# Patient Record
Sex: FEMALE | Race: WHITE | Employment: OTHER | URBAN - METROPOLITAN AREA
[De-identification: names, ages, dates, MRNs, and addresses within clinical notes are randomized per-mention and may not be internally consistent; named-entity substitution may affect disease eponyms.]

---

## 2018-06-15 ENCOUNTER — TELEPHONE (OUTPATIENT)
Dept: PAIN MANAGEMENT | Age: 78
End: 2018-06-15

## 2018-06-15 NOTE — LETTER
06/15/18    [facility]   Dr. Janice Guerrero, [address] [facility]   P: [phone]  F: [phone]    Re: Andriy Briseno 1940    To Whom it May Concern: Thank you for your referral to [facility] ([facility] Pain Management. Regrettably, we did not receive all the necessary information to determine if this patient can be accepted into our office. The diagnosis attached to the referral only states \"generalized osteoarthritis\" and this, unfortunately, does not alert us to what is causing her pain or the area the pain is in and There is no site specific imaging available for the reported body part causing pain. I am sending this message to advise your office of what is needed for her to be considered as a new patient with us. Please know that the information requested is reviewed by our providers and must be accepted by one of them prior to scheduling any appointments. It is necessary to determine the need for pain management/narcotics due to stricter laws and regulations set in place by the state of [place], and all patients are required to provide this information without exception. For physician review and follow up, please include ALL the following:     · Referral that includes diagnosis codes showing what is causing her pain  · Demographics sheet and Insurance card   · Last 2 completed office notes (1 is fine if she has only been seen once)  · Site specific imaging (cannot be more than 11years old & must match referral diagnosis. Fibro/Neuropathy will require EMG)  · Most recent Urine Drug Screen results - not required if none available  · Pre-Approved C9 for Pain Mgmt Consult/Letter from [facility] authorizing Pain Mgmt & a list of allowed conditions -  for [facility] patients only    The requested information should be faxed to [phone] or you may call us at [phone] to let us know that new information is available.  Please be aware that imaging is required for every referral. If none is

## 2018-06-15 NOTE — TELEPHONE ENCOUNTER
We received a referral for this patient, but information was missing. We need OV notes, a diagnosis code that alerts us to what is causing the pain or the area the pain is in, imaging, Insurance information, [facility] info and UDS to complete her referral. A letter was faxed to the referring provider to request additional information. If nothing is received within 30 days from todays date, the referral will be canceled.

## 2022-05-16 ENCOUNTER — APPOINTMENT (OUTPATIENT)
Dept: GENERAL RADIOLOGY | Age: 82
DRG: 065 | End: 2022-05-16
Payer: MEDICARE

## 2022-05-16 ENCOUNTER — APPOINTMENT (OUTPATIENT)
Dept: MRI IMAGING | Age: 82
DRG: 065 | End: 2022-05-16
Payer: MEDICARE

## 2022-05-16 ENCOUNTER — HOSPITAL ENCOUNTER (INPATIENT)
Age: 82
LOS: 2 days | Discharge: NURSING, REHAB OR LONG-TERM CARE | DRG: 065 | End: 2022-05-18
Attending: EMERGENCY MEDICINE | Admitting: INTERNAL MEDICINE
Payer: MEDICARE

## 2022-05-16 ENCOUNTER — APPOINTMENT (OUTPATIENT)
Dept: CT IMAGING | Age: 82
DRG: 065 | End: 2022-05-16
Payer: MEDICARE

## 2022-05-16 DIAGNOSIS — I63.9 CEREBROVASCULAR ACCIDENT (CVA), UNSPECIFIED MECHANISM (HCC): Primary | ICD-10-CM

## 2022-05-16 LAB
A/G RATIO: 1.6 (ref 1.1–2.2)
ALBUMIN SERPL-MCNC: 3.8 G/DL (ref 3.4–5)
ALP BLD-CCNC: 74 U/L (ref 40–129)
ALT SERPL-CCNC: 11 U/L (ref 10–40)
ANION GAP SERPL CALCULATED.3IONS-SCNC: 10 MMOL/L (ref 3–16)
AST SERPL-CCNC: 15 U/L (ref 15–37)
BASOPHILS ABSOLUTE: 0.2 K/UL (ref 0–0.2)
BASOPHILS RELATIVE PERCENT: 1.4 %
BILIRUB SERPL-MCNC: 0.4 MG/DL (ref 0–1)
BUN BLDV-MCNC: 8 MG/DL (ref 7–20)
CALCIUM SERPL-MCNC: 8.9 MG/DL (ref 8.3–10.6)
CHLORIDE BLD-SCNC: 105 MMOL/L (ref 99–110)
CO2: 25 MMOL/L (ref 21–32)
CREAT SERPL-MCNC: 0.7 MG/DL (ref 0.6–1.2)
EKG ATRIAL RATE: 48 BPM
EKG DIAGNOSIS: NORMAL
EKG P AXIS: 48 DEGREES
EKG P-R INTERVAL: 152 MS
EKG Q-T INTERVAL: 442 MS
EKG QRS DURATION: 90 MS
EKG QTC CALCULATION (BAZETT): 394 MS
EKG R AXIS: -21 DEGREES
EKG T AXIS: 29 DEGREES
EKG VENTRICULAR RATE: 48 BPM
EOSINOPHILS ABSOLUTE: 0.1 K/UL (ref 0–0.6)
EOSINOPHILS RELATIVE PERCENT: 1.1 %
GFR AFRICAN AMERICAN: >60
GFR NON-AFRICAN AMERICAN: >60
GLUCOSE BLD-MCNC: 107 MG/DL (ref 70–99)
GLUCOSE BLD-MCNC: 116 MG/DL (ref 70–99)
HCT VFR BLD CALC: 33.5 % (ref 36–48)
HEMOGLOBIN: 11.2 G/DL (ref 12–16)
INR BLD: 0.98 (ref 0.88–1.12)
LYMPHOCYTES ABSOLUTE: 1.9 K/UL (ref 1–5.1)
LYMPHOCYTES RELATIVE PERCENT: 15.3 %
MCH RBC QN AUTO: 33.9 PG (ref 26–34)
MCHC RBC AUTO-ENTMCNC: 33.5 G/DL (ref 31–36)
MCV RBC AUTO: 101.3 FL (ref 80–100)
MONOCYTES ABSOLUTE: 0.6 K/UL (ref 0–1.3)
MONOCYTES RELATIVE PERCENT: 4.5 %
NEUTROPHILS ABSOLUTE: 9.6 K/UL (ref 1.7–7.7)
NEUTROPHILS RELATIVE PERCENT: 77.7 %
PDW BLD-RTO: 13.3 % (ref 12.4–15.4)
PERFORMED ON: ABNORMAL
PLATELET # BLD: 258 K/UL (ref 135–450)
PMV BLD AUTO: 9 FL (ref 5–10.5)
POTASSIUM REFLEX MAGNESIUM: 4 MMOL/L (ref 3.5–5.1)
PROTHROMBIN TIME: 11.1 SEC (ref 9.9–12.7)
RBC # BLD: 3.31 M/UL (ref 4–5.2)
SODIUM BLD-SCNC: 140 MMOL/L (ref 136–145)
TOTAL PROTEIN: 6.2 G/DL (ref 6.4–8.2)
TROPONIN: <0.01 NG/ML
WBC # BLD: 12.4 K/UL (ref 4–11)

## 2022-05-16 PROCEDURE — 6360000004 HC RX CONTRAST MEDICATION: Performed by: STUDENT IN AN ORGANIZED HEALTH CARE EDUCATION/TRAINING PROGRAM

## 2022-05-16 PROCEDURE — 93005 ELECTROCARDIOGRAM TRACING: CPT | Performed by: STUDENT IN AN ORGANIZED HEALTH CARE EDUCATION/TRAINING PROGRAM

## 2022-05-16 PROCEDURE — 2580000003 HC RX 258: Performed by: INTERNAL MEDICINE

## 2022-05-16 PROCEDURE — 80061 LIPID PANEL: CPT

## 2022-05-16 PROCEDURE — 2060000000 HC ICU INTERMEDIATE R&B

## 2022-05-16 PROCEDURE — 71045 X-RAY EXAM CHEST 1 VIEW: CPT

## 2022-05-16 PROCEDURE — 85025 COMPLETE CBC W/AUTO DIFF WBC: CPT

## 2022-05-16 PROCEDURE — 80053 COMPREHEN METABOLIC PANEL: CPT

## 2022-05-16 PROCEDURE — 83036 HEMOGLOBIN GLYCOSYLATED A1C: CPT

## 2022-05-16 PROCEDURE — 6360000002 HC RX W HCPCS: Performed by: INTERNAL MEDICINE

## 2022-05-16 PROCEDURE — 70498 CT ANGIOGRAPHY NECK: CPT

## 2022-05-16 PROCEDURE — 99285 EMERGENCY DEPT VISIT HI MDM: CPT

## 2022-05-16 PROCEDURE — 70551 MRI BRAIN STEM W/O DYE: CPT

## 2022-05-16 PROCEDURE — 85610 PROTHROMBIN TIME: CPT

## 2022-05-16 PROCEDURE — 4A03X5D MEASUREMENT OF ARTERIAL FLOW, INTRACRANIAL, EXTERNAL APPROACH: ICD-10-PCS | Performed by: INTERNAL MEDICINE

## 2022-05-16 PROCEDURE — 84484 ASSAY OF TROPONIN QUANT: CPT

## 2022-05-16 PROCEDURE — 36415 COLL VENOUS BLD VENIPUNCTURE: CPT

## 2022-05-16 RX ORDER — MISOPROSTOL 100 UG/1
100 TABLET ORAL 2 TIMES DAILY
COMMUNITY

## 2022-05-16 RX ORDER — PROMETHAZINE HYDROCHLORIDE 25 MG/1
12.5 TABLET ORAL EVERY 6 HOURS PRN
Status: DISCONTINUED | OUTPATIENT
Start: 2022-05-16 | End: 2022-05-18 | Stop reason: HOSPADM

## 2022-05-16 RX ORDER — POLYETHYLENE GLYCOL 3350 17 G/17G
17 POWDER, FOR SOLUTION ORAL DAILY PRN
Status: DISCONTINUED | OUTPATIENT
Start: 2022-05-16 | End: 2022-05-18 | Stop reason: HOSPADM

## 2022-05-16 RX ORDER — ACETAMINOPHEN 325 MG/1
650 TABLET ORAL EVERY 6 HOURS PRN
Status: DISCONTINUED | OUTPATIENT
Start: 2022-05-16 | End: 2022-05-18 | Stop reason: HOSPADM

## 2022-05-16 RX ORDER — CLOTRIMAZOLE AND BETAMETHASONE DIPROPIONATE 10; .64 MG/G; MG/G
CREAM TOPICAL 2 TIMES DAILY
Status: ON HOLD | COMMUNITY
End: 2022-05-27 | Stop reason: HOSPADM

## 2022-05-16 RX ORDER — ENOXAPARIN SODIUM 100 MG/ML
40 INJECTION SUBCUTANEOUS DAILY
Status: DISCONTINUED | OUTPATIENT
Start: 2022-05-16 | End: 2022-05-18 | Stop reason: HOSPADM

## 2022-05-16 RX ORDER — ACETAMINOPHEN 650 MG/1
650 SUPPOSITORY RECTAL EVERY 6 HOURS PRN
Status: DISCONTINUED | OUTPATIENT
Start: 2022-05-16 | End: 2022-05-18 | Stop reason: HOSPADM

## 2022-05-16 RX ORDER — HYDRALAZINE HYDROCHLORIDE 20 MG/ML
5 INJECTION INTRAMUSCULAR; INTRAVENOUS EVERY 4 HOURS PRN
Status: DISCONTINUED | OUTPATIENT
Start: 2022-05-16 | End: 2022-05-18 | Stop reason: HOSPADM

## 2022-05-16 RX ORDER — VITAMIN B COMPLEX
1 CAPSULE ORAL DAILY
COMMUNITY

## 2022-05-16 RX ORDER — SENNA AND DOCUSATE SODIUM 50; 8.6 MG/1; MG/1
1 TABLET, FILM COATED ORAL 2 TIMES DAILY
Status: DISCONTINUED | OUTPATIENT
Start: 2022-05-16 | End: 2022-05-18 | Stop reason: HOSPADM

## 2022-05-16 RX ORDER — FLUTICASONE PROPIONATE 50 MCG
1 SPRAY, SUSPENSION (ML) NASAL DAILY
COMMUNITY

## 2022-05-16 RX ORDER — ESTRADIOL 0.1 MG/D
1 FILM, EXTENDED RELEASE TRANSDERMAL EVERY OTHER DAY
Status: ON HOLD | COMMUNITY
End: 2022-05-17

## 2022-05-16 RX ORDER — ONDANSETRON 2 MG/ML
4 INJECTION INTRAMUSCULAR; INTRAVENOUS EVERY 6 HOURS PRN
Status: DISCONTINUED | OUTPATIENT
Start: 2022-05-16 | End: 2022-05-18 | Stop reason: HOSPADM

## 2022-05-16 RX ORDER — SODIUM CHLORIDE 9 MG/ML
INJECTION, SOLUTION INTRAVENOUS PRN
Status: DISCONTINUED | OUTPATIENT
Start: 2022-05-16 | End: 2022-05-18 | Stop reason: HOSPADM

## 2022-05-16 RX ORDER — LISINOPRIL 20 MG/1
20 TABLET ORAL DAILY
Status: ON HOLD | COMMUNITY
End: 2022-05-27 | Stop reason: HOSPADM

## 2022-05-16 RX ORDER — ATORVASTATIN CALCIUM 40 MG/1
40 TABLET, FILM COATED ORAL NIGHTLY
COMMUNITY

## 2022-05-16 RX ORDER — OXYCODONE HYDROCHLORIDE AND ACETAMINOPHEN 5; 325 MG/1; MG/1
1 TABLET ORAL EVERY 12 HOURS PRN
Status: ON HOLD | COMMUNITY
End: 2022-05-17

## 2022-05-16 RX ORDER — OMEPRAZOLE 20 MG/1
40 CAPSULE, DELAYED RELEASE ORAL DAILY
Status: ON HOLD | COMMUNITY
End: 2022-05-27 | Stop reason: HOSPADM

## 2022-05-16 RX ORDER — FUROSEMIDE 20 MG/1
20 TABLET ORAL DAILY
COMMUNITY

## 2022-05-16 RX ORDER — METOPROLOL TARTRATE 100 MG/1
100 TABLET ORAL 2 TIMES DAILY
Status: ON HOLD | COMMUNITY
End: 2022-05-27 | Stop reason: HOSPADM

## 2022-05-16 RX ORDER — SODIUM CHLORIDE 0.9 % (FLUSH) 0.9 %
10 SYRINGE (ML) INJECTION EVERY 12 HOURS SCHEDULED
Status: DISCONTINUED | OUTPATIENT
Start: 2022-05-16 | End: 2022-05-18 | Stop reason: HOSPADM

## 2022-05-16 RX ORDER — SODIUM CHLORIDE 0.9 % (FLUSH) 0.9 %
10 SYRINGE (ML) INJECTION PRN
Status: DISCONTINUED | OUTPATIENT
Start: 2022-05-16 | End: 2022-05-18 | Stop reason: HOSPADM

## 2022-05-16 RX ORDER — MEMANTINE HYDROCHLORIDE 5 MG/1
5 TABLET ORAL 2 TIMES DAILY
COMMUNITY

## 2022-05-16 RX ADMIN — IOPAMIDOL 80 ML: 755 INJECTION, SOLUTION INTRAVENOUS at 14:41

## 2022-05-16 RX ADMIN — HYDRALAZINE HYDROCHLORIDE 5 MG: 20 INJECTION INTRAMUSCULAR; INTRAVENOUS at 21:34

## 2022-05-16 RX ADMIN — SODIUM CHLORIDE, PRESERVATIVE FREE 10 ML: 5 INJECTION INTRAVENOUS at 20:39

## 2022-05-16 ASSESSMENT — PAIN DESCRIPTION - DESCRIPTORS: DESCRIPTORS: ACHING

## 2022-05-16 ASSESSMENT — PAIN DESCRIPTION - PAIN TYPE: TYPE: ACUTE PAIN

## 2022-05-16 ASSESSMENT — PAIN SCALES - GENERAL: PAINLEVEL_OUTOF10: 5

## 2022-05-16 ASSESSMENT — PAIN DESCRIPTION - LOCATION: LOCATION: HEAD

## 2022-05-16 ASSESSMENT — PAIN DESCRIPTION - ONSET: ONSET: GRADUAL

## 2022-05-16 ASSESSMENT — PAIN DESCRIPTION - FREQUENCY: FREQUENCY: CONTINUOUS

## 2022-05-16 ASSESSMENT — PAIN - FUNCTIONAL ASSESSMENT: PAIN_FUNCTIONAL_ASSESSMENT: NONE - DENIES PAIN

## 2022-05-16 ASSESSMENT — PAIN DESCRIPTION - ORIENTATION: ORIENTATION: MID

## 2022-05-16 NOTE — PROGRESS NOTES
Clinical Pharmacy Consult Note    80 y.o. female admitted with Facial Droop . Pharmacy has been asked by Dr. Izaguirre Letters to adjust all drips to normal saline as appropriate based on compatibility to avoid fluid shifts since D5 is osmotically active. The following intermittent IV drips/infusions have been adjusted to saline:  n/a    The following medications must remain in D5W due to incompatibility with normal saline:  Amphotericin  Mycophenolate  Nitroprusside  Penicillin G Potassium    Please be aware that patient has D5W ordered as part of hypoglycemia orderset. [facility] will follow daily to ensure all new IVPBs + drips are in NS. Please call with questions.   Adelita Corrales, PharmD  Main Pharmacy: [id number]

## 2022-05-16 NOTE — ED PROVIDER NOTES
ED Attending Attestation Note     Date of evaluation: 5/16/2022    This patient was seen by the resident. I have seen and examined the patient, agree with the workup, evaluation, management and diagnosis. The care plan has been discussed. I have reviewed the ECG and concur with the resident's interpretation. My assessment reveals mild right-sided weakness and dysarthria. No tPA due to time. Will obtain CTA to evaluate for LVO.      Jazmine Baptiste MD  05/16/22 [id number]

## 2022-05-16 NOTE — PROGRESS NOTES
4 Eyes Skin Assessment     NAME:  Bertha Job  YOB: 1940  MEDICAL RECORD NUMBER:  [id number]    The patient is being assess for  Admission    I agree that 2 RN's have performed a thorough Head to Toe Skin Assessment on the patient. ALL assessment sites listed below have been assessed. Areas assessed by both nurses:    Head, Face, Ears, Shoulders, Back, Chest, Arms, Elbows, Hands, Sacrum. Buttock, Coccyx, Ischium and Legs. Feet and Heels        Does the Patient have a Wound?  No noted wound(s)       [place] Prevention initiated:  No   Wound Care Orders initiated:  No    Pressure Injury (Stage 3,4, Unstageable, DTI, NWPT, and Complex wounds) if present place consult order under [de-identified] No    New and Established Ostomies if present place consult order under : NA      Nurse 1 eSignature: Electronically signed by She Patiño RN on 5/16/22 at 7:46 PM EDT    **SHARE this note so that the co-signing nurse is able to place an eSignature**    Nurse 2 eSignature: Electronically signed by Pual Ortez RN on 5/16/22 at 7:47 PM EDT

## 2022-05-16 NOTE — ED PROVIDER NOTES
[address]          EM RESIDENT NOTE       Date of evaluation: 5/16/2022    Chief Complaint     Facial Droop (Pt reports right sided weakness with dinner last night and worsening symptoms since. Pt has right sided facial droop and right sided hand weakness (mild) and right arm drift that does not hit bed. Pt [facility] where she had a CT in route), Aphasia, and Extremity Weakness      History of Present Illness     Louann Murray is a 80 y.o. female  with PMHx COPD, schizophrenia who presents via [facility] for weakness of her right side and facial droop that started last night around 6 pm. She called EMS today as she was unable to walk. She reports weakness in her right upper and lower. Does not take blood thinners. FSBG for EMS was 120s. CT head on [facility] without hemorrhage. Patient has not yet tried any other treatment for their symptoms and nothing else seems to make them better or worse. Aside from the above, patient denies any aggravating or alleviating factors or associated symptoms. Review of Systems     Positive for: weakness  Negative for: fever, chills, nausea, vomiting, abdominal pain, vision changes, hearing changes, headache, seizures, chest pain, shortness of breath  All other systems reviewed and negative, except as stated in HPI. Past Medical, Surgical, Family, and Social History     She has no past medical history on file. She has no past surgical history on file. Her family history is not on file. She reports that she has been smoking cigarettes. She has been smoking about 2.00 packs per day. She has never used smokeless tobacco. She reports that she does not drink alcohol and does not use drugs. Medications     Previous Medications    No medications on file       Allergies     She has No Known Allergies.     Physical Exam     INITIAL VITALS: BP: (!) 200/42, Temp: 98 °F (36.7 °C), Pulse: 52, Resp: 20, SpO2: 94 %     General: Chronically ill appearing, obvious right sided facial droop    HEENT:  Normocephalic, atraumatic     Eyes: Anicteric, EOMI     Neck:  Supple, full ROM    Pulmonary:   CTA bl, no wheezes, rales, rhonchi    Cardiac:  Regular rate and rhythm, no m/r/g,     Abdomen:  Soft, nondistended, nontender    Extremities:  Warm, 2+ radial pulses    Skin: No rashes or bruises    Neuro:     - Alert and oriented to person, place, time and situation  - Follows commands readily  - mild dysarthria without aphasia  - right lower facial droop that spares the upper face  - Visual fields intact bilaterally  - EOMI bilaterally, PERRLA. CN V motor intact. SILT in V1-V3 distribution. and sensory intact. CN VII intact: Hearing intact bilaterally and symmetric. Symmetric palate raise without uvular deviation. Symmetric shoulder shrug. SCM 5/5 bilaterally. Tongue is midline.    - 5/5 strength in the left deltoids, biceps, triceps, wrist flexion/extension, . 3.5/5 on the right. There ir drift in the right upper extremity that hits bed. 4/5 strength in the bilateral  hip flexion, knee flex/ext, ankle dorsiflexion/plantarflexion. Cannot keep either leg off the bed  - Sensation to soft touch intact in the upper and lower extremities bilaterally   - Finger-to-nose intact bilaterally in the LUE.  Cannot assess RUE due to weakness.   - Gait deferred due to weaknes    Musculoskeletal: denies pain, recent fracture     Psych:   Mood and affect appropriate,     DiagnosticResults     EKG   Interpreted in conjunction with emergencydepartment physician Ranburne Embs*  Rhythm: sinus bradycardia  Rate: bradycardia  Axis: left  Ectopy: none  Conduction: normal  ST Segments: no acute change  T Waves:no acute change  Q Waves: none  Clinical Impression: no acute changes  Comparison:  No prior available for comparison    RADIOLOGY:  XR CHEST PORTABLE   Final Result   Impression: Bilateral perihilar opacities and prominence of the pulmonary vasculature. CTA HEAD NECK W CONTRAST   Final Result      1. No large vessel occlusion. 2. No flow-limiting steno-occlusive disease. 50% stenosis of the right ICA. 20% stenosis of the left ICA. 3. Ill-defined 6 mm right apical nodule, favored be inflammatory. Recommend follow-up chest CT in 3 months. LABS:   Results for orders placed or performed during the hospital encounter of 05/16/22   CBC with Auto Differential   Result Value Ref Range    WBC 12.4 (H) 4.0 - 11.0 K/uL    RBC 3.31 (L) 4.00 - 5.20 M/uL    Hemoglobin 11.2 (L) 12.0 - 16.0 g/dL    Hematocrit 33.5 (L) 36.0 - 48.0 %    .3 (H) 80.0 - 100.0 fL    MCH 33.9 26.0 - 34.0 pg    MCHC 33.5 31.0 - 36.0 g/dL    RDW 13.3 12.4 - 15.4 %    Platelets 890 991 - 861 K/uL    MPV 9.0 5.0 - 10.5 fL    Neutrophils % 77.7 %    Lymphocytes % 15.3 %    Monocytes % 4.5 %    Eosinophils % 1.1 %    Basophils % 1.4 %    Neutrophils Absolute 9.6 (H) 1.7 - 7.7 K/uL    Lymphocytes Absolute 1.9 1.0 - 5.1 K/uL    Monocytes Absolute 0.6 0.0 - 1.3 K/uL    Eosinophils Absolute 0.1 0.0 - 0.6 K/uL    Basophils Absolute 0.2 0.0 - 0.2 K/uL   EKG 12 Lead   Result Value Ref Range    Ventricular Rate 48 BPM    Atrial Rate 48 BPM    P-R Interval 152 ms    QRS Duration 90 ms    Q-T Interval 442 ms    QTc Calculation (Bazett) 394 ms    P Axis 48 degrees    R Axis -21 degrees    T Axis 29 degrees    Diagnosis       EKG performed in ER and to be interpreted by ER physician. Confirmed by MD, ER ([id number]),  Neil Riggs [phone]) on 5/16/2022 3:18:18 PM       RECENT VITALS:  BP: (!) 194/46, Temp: 98 °F (36.7 °C), Pulse: (!) 49,Resp: 21, SpO2: 93 %     ED Course     Nursing Notes, Past Medical Hx, Past Surgical Hx, Social Hx, Allergies, and Family Hx were reviewed.     The patient was given the followingmedications:  Orders Placed This Encounter   Medications    iopamidol (ISOVUE-370) 76 % injection 80 mL       CONSULTS:  [address] Hiren Mitchell FLUIDS  IP CONSULT TO HOSPITALIST    MEDICAL DECISION MAKING / ASSESSMENT / Susy Briggs is a 80 y.o. female with a history and presentation as described above in HPI. The patient was evaluated by myself and the ED Attending Physician, Dr. Sherlyn Mendoza. All management and disposition plans were discussed and agreed upon. Appropriate labs and diagnostic studies were reviewed as they were made available. Pertinent laboratory studies in medical decision making are listed below. Appropriate labs and diagnostic studies were reviewed as they were made available. Pertinent laboratory studies in medical decision making are listed below. Upon presentation, the patient was evaluated by me. Patient presents as noted above with strokelike symptoms since about 6:00 last night. Here she has right-sided facial droop right-sided weakness. Her Noncon head CT by the [facility] stroke [facility] did not demonstrate evidence of hemorrhage. CTA here did not demonstrate LVO. I discussed with stroke team and she does not require CTP at this time. There is no additional treatment for her given that she is outside the tPA window. She had a normal glucose. Her CBC demonstrates a mild leukocytosis but is otherwise benign. Her remaining labs are pending at this time. Overall this is consistent with a stroke and she will be admitted to the hospital for MRI and further restratification and risk factor management. NIH Stroke Scale  Interval: Baseline  Level of Consciousness (1a): Alert  LOC Questions (1b):  Answers both correctly  LOC Commands (1c): Performs both tasks correctly  Best Gaze (2): Normal  Visual (3): No visual loss  Facial Palsy (4): (!) Minor paralysis  Motor Arm, Left (5a): No drift  Motor Arm, Right (5b): Drift, but does not hit bed  Motor Leg, Left (6a): No effort against gravity, limb falls  Motor Leg, Right (6b): No effort against gravity, limb falls  Limb Ataxia (7): Absent  Sensory (8): Normal  Best Language (9): Mild to moderate aphasia  Dysarthria (10): Mild to moderate, slurs some words  Extinction and Inattention (11): No abnormality  Total: 10      The patient's ultimate disposition was: admit    At this time the patient has been admitted to medicine for further evaluation and management of stroke. The patient will continue to be monitored here in the emergency department until which time she is moved to her new treatment location. Clinical Impression     1. Cerebrovascular accident (CVA), unspecified mechanism ([facility] [address]        Disposition     PATIENT REFERRED TO:  No follow-up provider specified.     DISCHARGE MEDICATIONS:  New Prescriptions    No medications on file       DISPOSITION Decision To Admit 05/16/2022 03:43:09 PM      MD Dave Boyle MD  05/16/22 1543

## 2022-05-16 NOTE — PROGRESS NOTES
Patient has arrived to room. Patient alert and oriented times 4 spheres. Speech slightly slurred when verbal. Patient denies any needs at this time.

## 2022-05-16 NOTE — H&P
Hospital Medicine History & Physical      PCP: No primary care provider on file. Date of Admission: 5/16/2022    Date of Service: 5/16/2022    Pt seen/examined on 5/16/2022    Admitted to Inpatient with expected LOS greater than two midnights due to medical therapy. Chief Complaint:     Chief Complaint   Patient presents with    Facial Droop     Pt reports right sided weakness with dinner last night and worsening symptoms since. Pt has right sided facial droop and right sided hand weakness (mild) and right arm drift that does not hit bed. [facility] where she [facility] CT in route    Aphasia    Extremity Weakness       History Of Present Illness:      80 y.o. female who presented to [facility] with right-sided weakness that began last evening about 5:30 PM.  She was at her baseline laying in bed when she noticed right upper and right lower extremity feeling more heavy. She denies any pain, numbness, tingling in those extremities. A family member noticed that she had mildly slurred speech but the patient herself has not noticed this. She denies any confusion or trouble with word finding. She denies trouble swallowing though on swallow eval in the ED she was noted to have difficulty. NIH was 10. She is otherwise feeling well. Denies any chest pain, palpitations, lightheadedness or dizziness, no abdominal pain, nausea vomiting or diarrhea. No fevers or known sick contacts. She reports that her symptoms have improved gradually since they began and at this time they are not quite to baseline but markedly improved from last night. Admitted for further management. Past Medical History:      Reviewed and non-contributory except as noted below  History reviewed. No pertinent past medical history. Past Surgical History:      Reviewed and non-contributory except as noted below  History reviewed. No pertinent surgical history.     Medications Prior to Admission: Reviewed and non-contributory except as noted below  Prior to Admission medications    Not on File       Allergies:     Reviewed and non-contributory except as noted below   Patient has no known allergies. Social History:      Reviewed and non-contributory except as noted below    TOBACCO:   reports that she has been smoking cigarettes. She has been smoking about 2.00 packs per day. She has never used smokeless tobacco.  ETOH:   reports no history of alcohol use. Family History:      Reviewed and non-contributory except as noted below    History reviewed. No pertinent family history. REVIEW OF SYSTEMS:   Pertinent positives and negatives as noted in the HPI. All other systems reviewed and negative.     PHYSICAL EXAM PERFORMED:    BP (!) 192/44   Pulse 50   Temp 98 °F (36.7 °C)   Resp 11   Ht 5' 4\" (1.626 m)   Wt 166 lb 3.2 oz (75.4 kg)   SpO2 97%   BMI 28.53 kg/m²     General appearance:  No acute distress, appears stated age  Eyes: Pupils equal, round, reactive to light, conjunctiva/corneas clear  Ears/Nose/Mouth/Throat: No external lesions or scars, hearing intact to voice  Neck: Trachea midline, no masses noted, no thyromegaly  Respiratory:  Non-labored breathing, clear to auscultation bilaterally  Cardiovascular: Regular rate and rhythm, no murmurs, gallops, or rubs  Abdomen: soft, non-tender, non-distended  Musculoskeletal: Warm, well perfused, no cyanosis or edema  Skin: No rashes  Psychiatric: A&Ox4, good insight and judgment    Labs:     Recent Labs     05/16/22  1513   WBC 12.4*   HGB 11.2*   HCT 33.5*        Recent Labs     05/16/22  1513      K 4.0      CO2 25   BUN 8   CREATININE 0.7   CALCIUM 8.9     Recent Labs     05/16/22  1513   AST 15   ALT 11   BILITOT 0.4   ALKPHOS 74     Recent Labs     05/16/22  1513   INR 0.98     Recent Labs     05/16/22  1513   TROPONINI <0.01       Urinalysis:    No results found for: Wesley Alcocer, [address], BACTERIA, RBCUA, BLOODU, SPECGRAV, GLUCOSEU    Radiology:     XR CHEST PORTABLE   Final Result   Impression: Bilateral perihilar opacities and prominence of the pulmonary vasculature. CTA HEAD NECK W CONTRAST   Final Result      1. No large vessel occlusion. 2. No flow-limiting steno-occlusive disease. 50% stenosis of the right ICA. 20% stenosis of the left ICA. 3. Ill-defined 6 mm right apical nodule, favored be inflammatory. Recommend follow-up chest CT in 3 months. MRI BRAIN WO CONTRAST    (Results Pending)       ASSESSMENT:    Active Hospital Problems    Diagnosis Date Noted    Acute cerebrovascular accident (CVA) ([facility] [address] [I63.9] 05/16/2022     Priority: Medium       PLAN:    #CVA  -CT, CTA w/ findings above  -no acute intervention per stroke team  -neuro consulted  -MRI ordered  -Echo ordered  -tele  -PT/OT  Failed swallow eval in the ED, remain n.p.o. for now, speech consulted  -check a1c, lipids, UDS  -asa, statin  -neuro checks  -hold antihypertensives for permissive HTN up to , as needed hydralazine ordered    #Right apical lung nodule  Incidentally noted on CT, recommend 3-month follow-up imaging    #Schizophrenia  Well-controlled, continue home management    #Remainder of chronic medical conditions  -home management except as above    DVT Prophylaxis: Lovenox  Diet: Diet NPO  Code Status: Full Code    PT/OT Eval Status: Ongoing    Dispo: Brynn Duverney pending clinical improvement    Ramon Oseguera MD    Thank you No primary care provider on file. for the opportunity to be involved in this patient's care.  If you have any questions or concerns please feel free to contact me at [phone]

## 2022-05-17 ENCOUNTER — APPOINTMENT (OUTPATIENT)
Dept: GENERAL RADIOLOGY | Age: 82
DRG: 065 | End: 2022-05-17
Payer: MEDICARE

## 2022-05-17 PROBLEM — I10 PRIMARY HYPERTENSION: Status: ACTIVE | Noted: 2022-05-17

## 2022-05-17 PROBLEM — I63.81 THALAMIC STROKE (HCC): Status: ACTIVE | Noted: 2022-05-16

## 2022-05-17 PROBLEM — F31.9 BIPOLAR DISORDER (HCC): Status: ACTIVE | Noted: 2022-05-17

## 2022-05-17 PROBLEM — F17.200 SMOKING: Status: ACTIVE | Noted: 2022-05-17

## 2022-05-17 LAB
ANION GAP SERPL CALCULATED.3IONS-SCNC: 10 MMOL/L (ref 3–16)
BASOPHILS ABSOLUTE: 0.2 K/UL (ref 0–0.2)
BASOPHILS RELATIVE PERCENT: 2 %
BUN BLDV-MCNC: 11 MG/DL (ref 7–20)
CALCIUM SERPL-MCNC: 9.4 MG/DL (ref 8.3–10.6)
CHLORIDE BLD-SCNC: 105 MMOL/L (ref 99–110)
CHOLESTEROL, TOTAL: 148 MG/DL (ref 0–199)
CO2: 28 MMOL/L (ref 21–32)
CREAT SERPL-MCNC: 0.6 MG/DL (ref 0.6–1.2)
EOSINOPHILS ABSOLUTE: 0.1 K/UL (ref 0–0.6)
EOSINOPHILS RELATIVE PERCENT: 1.1 %
GFR AFRICAN AMERICAN: >60
GFR NON-AFRICAN AMERICAN: >60
GLUCOSE BLD-MCNC: 91 MG/DL (ref 70–99)
HCT VFR BLD CALC: 35 % (ref 36–48)
HDLC SERPL-MCNC: 38 MG/DL (ref 40–60)
HEMOGLOBIN: 11.8 G/DL (ref 12–16)
LDL CHOLESTEROL CALCULATED: 82 MG/DL
LYMPHOCYTES ABSOLUTE: 2.3 K/UL (ref 1–5.1)
LYMPHOCYTES RELATIVE PERCENT: 24.1 %
MCH RBC QN AUTO: 34.4 PG (ref 26–34)
MCHC RBC AUTO-ENTMCNC: 33.7 G/DL (ref 31–36)
MCV RBC AUTO: 102 FL (ref 80–100)
MONOCYTES ABSOLUTE: 0.7 K/UL (ref 0–1.3)
MONOCYTES RELATIVE PERCENT: 7.1 %
NEUTROPHILS ABSOLUTE: 6.4 K/UL (ref 1.7–7.7)
NEUTROPHILS RELATIVE PERCENT: 65.7 %
PDW BLD-RTO: 13.6 % (ref 12.4–15.4)
PLATELET # BLD: 250 K/UL (ref 135–450)
PMV BLD AUTO: 9.5 FL (ref 5–10.5)
POTASSIUM REFLEX MAGNESIUM: 3.7 MMOL/L (ref 3.5–5.1)
RBC # BLD: 3.43 M/UL (ref 4–5.2)
SODIUM BLD-SCNC: 143 MMOL/L (ref 136–145)
TRIGL SERPL-MCNC: 139 MG/DL (ref 0–150)
VLDLC SERPL CALC-MCNC: 28 MG/DL
WBC # BLD: 9.7 K/UL (ref 4–11)

## 2022-05-17 PROCEDURE — 97116 GAIT TRAINING THERAPY: CPT

## 2022-05-17 PROCEDURE — 80048 BASIC METABOLIC PNL TOTAL CA: CPT

## 2022-05-17 PROCEDURE — 2580000003 HC RX 258: Performed by: INTERNAL MEDICINE

## 2022-05-17 PROCEDURE — 94761 N-INVAS EAR/PLS OXIMETRY MLT: CPT

## 2022-05-17 PROCEDURE — 97162 PT EVAL MOD COMPLEX 30 MIN: CPT

## 2022-05-17 PROCEDURE — C8923 2D TTE W OR W/O FOL W/CON,CO: HCPCS

## 2022-05-17 PROCEDURE — 85025 COMPLETE CBC W/AUTO DIFF WBC: CPT

## 2022-05-17 PROCEDURE — 6360000002 HC RX W HCPCS: Performed by: INTERNAL MEDICINE

## 2022-05-17 PROCEDURE — 92611 MOTION FLUOROSCOPY/SWALLOW: CPT

## 2022-05-17 PROCEDURE — 92526 ORAL FUNCTION THERAPY: CPT

## 2022-05-17 PROCEDURE — 6370000000 HC RX 637 (ALT 250 FOR IP): Performed by: INTERNAL MEDICINE

## 2022-05-17 PROCEDURE — 6370000000 HC RX 637 (ALT 250 FOR IP): Performed by: STUDENT IN AN ORGANIZED HEALTH CARE EDUCATION/TRAINING PROGRAM

## 2022-05-17 PROCEDURE — 97530 THERAPEUTIC ACTIVITIES: CPT

## 2022-05-17 PROCEDURE — 92610 EVALUATE SWALLOWING FUNCTION: CPT

## 2022-05-17 PROCEDURE — 97535 SELF CARE MNGMENT TRAINING: CPT

## 2022-05-17 PROCEDURE — 2060000000 HC ICU INTERMEDIATE R&B

## 2022-05-17 PROCEDURE — 99223 1ST HOSP IP/OBS HIGH 75: CPT | Performed by: PSYCHIATRY & NEUROLOGY

## 2022-05-17 PROCEDURE — 93325 DOPPLER ECHO COLOR FLOW MAPG: CPT

## 2022-05-17 PROCEDURE — 74230 X-RAY XM SWLNG FUNCJ C+: CPT

## 2022-05-17 PROCEDURE — 97167 OT EVAL HIGH COMPLEX 60 MIN: CPT

## 2022-05-17 PROCEDURE — 36415 COLL VENOUS BLD VENIPUNCTURE: CPT

## 2022-05-17 PROCEDURE — 92523 SPEECH SOUND LANG COMPREHEN: CPT

## 2022-05-17 RX ORDER — LISINOPRIL 20 MG/1
20 TABLET ORAL DAILY
Status: DISCONTINUED | OUTPATIENT
Start: 2022-05-17 | End: 2022-05-18 | Stop reason: HOSPADM

## 2022-05-17 RX ORDER — HYDROCODONE BITARTRATE AND ACETAMINOPHEN 5; 325 MG/1; MG/1
1 TABLET ORAL EVERY 8 HOURS PRN
COMMUNITY
End: 2022-05-28 | Stop reason: SDUPTHER

## 2022-05-17 RX ORDER — NORTRIPTYLINE HYDROCHLORIDE 25 MG/1
25 CAPSULE ORAL DAILY
Status: ON HOLD | COMMUNITY
End: 2022-05-17 | Stop reason: CLARIF

## 2022-05-17 RX ORDER — ASPIRIN 81 MG/1
81 TABLET, CHEWABLE ORAL DAILY
Status: DISCONTINUED | OUTPATIENT
Start: 2022-05-18 | End: 2022-05-18 | Stop reason: HOSPADM

## 2022-05-17 RX ORDER — MISOPROSTOL 100 UG/1
100 TABLET ORAL 2 TIMES DAILY
Status: DISCONTINUED | OUTPATIENT
Start: 2022-05-17 | End: 2022-05-18 | Stop reason: HOSPADM

## 2022-05-17 RX ORDER — ESTRADIOL 0.1 MG/G
0.5 CREAM VAGINAL EVERY OTHER DAY
COMMUNITY

## 2022-05-17 RX ORDER — ATORVASTATIN CALCIUM 40 MG/1
40 TABLET, FILM COATED ORAL NIGHTLY
Status: DISCONTINUED | OUTPATIENT
Start: 2022-05-17 | End: 2022-05-17

## 2022-05-17 RX ORDER — HYDROCODONE BITARTRATE AND ACETAMINOPHEN 5; 325 MG/1; MG/1
1 TABLET ORAL EVERY 6 HOURS PRN
Status: DISCONTINUED | OUTPATIENT
Start: 2022-05-17 | End: 2022-05-18 | Stop reason: HOSPADM

## 2022-05-17 RX ORDER — LITHIUM CARBONATE 150 MG/1
150 CAPSULE ORAL EVERY OTHER DAY
Status: DISCONTINUED | OUTPATIENT
Start: 2022-05-17 | End: 2022-05-18

## 2022-05-17 RX ORDER — LITHIUM CARBONATE 150 MG/1
150 CAPSULE ORAL EVERY OTHER DAY
COMMUNITY
Start: 2022-05-05 | End: 2022-05-28 | Stop reason: SDUPTHER

## 2022-05-17 RX ORDER — DOCUSATE SODIUM 100 MG/1
100 CAPSULE, LIQUID FILLED ORAL EVERY EVENING
COMMUNITY
Start: 2022-05-06

## 2022-05-17 RX ORDER — DIVALPROEX SODIUM 125 MG/1
125 TABLET, DELAYED RELEASE ORAL EVERY EVENING
Status: ON HOLD | COMMUNITY
End: 2022-05-27 | Stop reason: HOSPADM

## 2022-05-17 RX ORDER — MEMANTINE HYDROCHLORIDE 5 MG/1
5 TABLET ORAL 2 TIMES DAILY
Status: DISCONTINUED | OUTPATIENT
Start: 2022-05-17 | End: 2022-05-18 | Stop reason: HOSPADM

## 2022-05-17 RX ORDER — FLUTICASONE PROPIONATE 50 MCG
1 SPRAY, SUSPENSION (ML) NASAL DAILY
Status: DISCONTINUED | OUTPATIENT
Start: 2022-05-17 | End: 2022-05-18

## 2022-05-17 RX ORDER — ATORVASTATIN CALCIUM 80 MG/1
80 TABLET, FILM COATED ORAL NIGHTLY
Status: DISCONTINUED | OUTPATIENT
Start: 2022-05-17 | End: 2022-05-18 | Stop reason: HOSPADM

## 2022-05-17 RX ORDER — VITAMIN B COMPLEX
1000 TABLET ORAL DAILY
Status: DISCONTINUED | OUTPATIENT
Start: 2022-05-17 | End: 2022-05-18 | Stop reason: HOSPADM

## 2022-05-17 RX ORDER — ATORVASTATIN CALCIUM 40 MG/1
40 TABLET, FILM COATED ORAL NIGHTLY
Status: CANCELLED | OUTPATIENT
Start: 2022-05-17

## 2022-05-17 RX ORDER — DIVALPROEX SODIUM 125 MG/1
125 TABLET, DELAYED RELEASE ORAL EVERY EVENING
Status: DISCONTINUED | OUTPATIENT
Start: 2022-05-17 | End: 2022-05-18

## 2022-05-17 RX ORDER — FUROSEMIDE 20 MG/1
20 TABLET ORAL DAILY
Status: DISCONTINUED | OUTPATIENT
Start: 2022-05-17 | End: 2022-05-18 | Stop reason: HOSPADM

## 2022-05-17 RX ORDER — METOPROLOL TARTRATE 100 MG/1
100 TABLET ORAL 2 TIMES DAILY
Status: DISCONTINUED | OUTPATIENT
Start: 2022-05-17 | End: 2022-05-18 | Stop reason: HOSPADM

## 2022-05-17 RX ADMIN — SENNOSIDES AND DOCUSATE SODIUM 1 TABLET: 50; 8.6 TABLET ORAL at 21:38

## 2022-05-17 RX ADMIN — MISOPROSTOL 100 MCG: 100 TABLET ORAL at 21:39

## 2022-05-17 RX ADMIN — FLUTICASONE PROPIONATE 1 SPRAY: 50 SPRAY, METERED NASAL at 12:46

## 2022-05-17 RX ADMIN — MISOPROSTOL 100 MCG: 100 TABLET ORAL at 12:46

## 2022-05-17 RX ADMIN — LISINOPRIL 20 MG: 20 TABLET ORAL at 12:40

## 2022-05-17 RX ADMIN — ENOXAPARIN SODIUM 40 MG: 100 INJECTION SUBCUTANEOUS at 08:38

## 2022-05-17 RX ADMIN — MEMANTINE HYDROCHLORIDE 5 MG: 5 TABLET ORAL at 21:38

## 2022-05-17 RX ADMIN — Medication 1000 UNITS: at 12:39

## 2022-05-17 RX ADMIN — METOPROLOL 100 MG: 100 TABLET ORAL at 21:38

## 2022-05-17 RX ADMIN — METOPROLOL 100 MG: 100 TABLET ORAL at 12:39

## 2022-05-17 RX ADMIN — SODIUM CHLORIDE, PRESERVATIVE FREE 10 ML: 5 INJECTION INTRAVENOUS at 21:40

## 2022-05-17 RX ADMIN — SODIUM CHLORIDE, PRESERVATIVE FREE 10 ML: 5 INJECTION INTRAVENOUS at 08:38

## 2022-05-17 RX ADMIN — DIVALPROEX SODIUM 125 MG: 125 TABLET, DELAYED RELEASE ORAL at 17:32

## 2022-05-17 RX ADMIN — FUROSEMIDE 20 MG: 20 TABLET ORAL at 12:40

## 2022-05-17 RX ADMIN — MEMANTINE HYDROCHLORIDE 5 MG: 5 TABLET ORAL at 12:40

## 2022-05-17 RX ADMIN — ASPIRIN 325 MG: 325 TABLET, COATED ORAL at 12:39

## 2022-05-17 RX ADMIN — ATORVASTATIN CALCIUM 80 MG: 80 TABLET, FILM COATED ORAL at 21:38

## 2022-05-17 ASSESSMENT — PAIN DESCRIPTION - FREQUENCY: FREQUENCY: CONTINUOUS

## 2022-05-17 ASSESSMENT — PAIN DESCRIPTION - LOCATION: LOCATION: HEAD

## 2022-05-17 ASSESSMENT — PAIN SCALES - GENERAL: PAINLEVEL_OUTOF10: 5

## 2022-05-17 ASSESSMENT — PAIN DESCRIPTION - PAIN TYPE: TYPE: ACUTE PAIN

## 2022-05-17 ASSESSMENT — PAIN DESCRIPTION - ORIENTATION: ORIENTATION: MID

## 2022-05-17 ASSESSMENT — PAIN DESCRIPTION - DESCRIPTORS: DESCRIPTORS: ACHING

## 2022-05-17 ASSESSMENT — PAIN DESCRIPTION - ONSET: ONSET: GRADUAL

## 2022-05-17 NOTE — CONSULTS
Clinical Pharmacy Progress Note  Medication History     Admit Date: 5/16/2022  Pharmacy asked to verify home medications per Dr. Mihir Vidal. Pt off unit at this time; med list verified based on Rx fill history (Surescripts) and PCP office visit notes. List of of current medications patient is taking is complete. Home Medication list in EPIC updated to reflect changes noted below. Source of information: Rx fill history (Surescripts) and CareEverywhere    Patient's home pharmacy: [facility]     Changes made to medication list:   Medications removed: (include reason, ex: therapy completed, inactive medication)   Estradiol patch   Oxycodone-APAP  Medications added:    Divalproex  mg daily - last filled 4/21/22 x 25d supply   Docusate - last filled 5/6/22   Estradiol vaginal cream - last filled 5/4/22   Lithium 150 mg every other night - last filled 5/5/22   Nortriptyline 25 mg daily - last filled 4/20/22   Norco - last filled 4/18/22   Vitamin D - last filled 2/4/22 x 90d supply  Medication doses adjusted:    Memantine 5 mg BID - last filled 5/2/22  Other notes:    Metoprolol 100 mg BID - last filled 2/28/22 x 90d supply   Unclear if patient is still taking atorvastatin, Lotrisone, Flonase   Per office visit note from 4/25/22 - olanzapine and nortriptyline were d/c'd at [address] on 4/25/22. Please call with any questions.   Elaine Zelaya PharmD, BCPS  Wireless: [id number]   5/17/2022 9:36 AM

## 2022-05-17 NOTE — PLAN OF CARE
Problem: Discharge Planning  Goal: Discharge to home or other facility with appropriate resources  Outcome: Progressing  Flowsheets  Taken 5/17/2022 1035  Discharge to home or other facility with appropriate resources:   Identify barriers to discharge with patient and caregiver   Arrange for needed discharge resources and transportation as appropriate   Identify discharge learning needs (meds, wound care, etc)   Refer to discharge planning if patient needs post-hospital services based on physician order or complex needs related to functional status, cognitive ability or social support system  Taken 5/17/2022 0747  Discharge to home or other facility with appropriate resources:   Identify barriers to discharge with patient and caregiver   Arrange for needed discharge resources and transportation as appropriate   Identify discharge learning needs (meds, wound care, etc)   Arrange for interpreters to assist at discharge as needed   Refer to discharge planning if patient needs post-hospital services based on physician order or complex needs related to functional status, cognitive ability or social support system     Problem: Pain  Goal: Verbalizes/displays adequate comfort level or baseline comfort level  Outcome: Progressing  Flowsheets  Taken 5/17/2022 1430  Verbalizes/displays adequate comfort level or baseline comfort level:   Encourage patient to monitor pain and request assistance   Assess pain using appropriate pain scale   Administer analgesics based on type and severity of pain and evaluate response   Implement non-pharmacological measures as appropriate and evaluate response   Consider cultural and social influences on pain and pain management   Notify Licensed Independent Practitioner if interventions unsuccessful or patient reports new pain  Taken 5/17/2022 1000  Verbalizes/displays adequate comfort level or baseline comfort level:   Encourage patient to monitor pain and request assistance   Assess pain using appropriate pain scale   Administer analgesics based on type and severity of pain and evaluate response   Implement non-pharmacological measures as appropriate and evaluate response   Consider cultural and social influences on pain and pain management  Taken 5/17/2022 0715  Verbalizes/displays adequate comfort level or baseline comfort level:   Encourage patient to monitor pain and request assistance   Assess pain using appropriate pain scale   Administer analgesics based on type and severity of pain and evaluate response   Implement non-pharmacological measures as appropriate and evaluate response   Consider cultural and social influences on pain and pain management   Notify Licensed Independent Practitioner if interventions unsuccessful or patient reports new pain     Problem: ABCDS Injury Assessment  Goal: Absence of physical injury  Outcome: Progressing

## 2022-05-17 NOTE — CARE COORDINATION
ADDENDUM: SW spoke to [facility] they can accept Pt whenever she is medically ready. Electronically signed by DAVID Basiloi, LESLI on 5/17/2022 at 3:03 PM      SW met w/Pt, Pt's son and granddaughter. Pt is from home w/her nephew and brother. Pt's family would like her to go to [facility]. Pt's granddaughter is a secondary contact in case Pt's son is unavailable. SW added Pt's granddaughter to there chart, [facility], [phone]. SW sent referral to [facility]. SW spoke to admissions [phone], they will review and call back. SW following.   Electronically signed by DAVID Basilio, LESLI on 5/17/2022 at 2:49 PM  [phone]

## 2022-05-17 NOTE — PROGRESS NOTES
Clinical Pharmacy Progress Note    80 y.o. female admitted with CVA. Pharmacy has been asked by Dr. Ginny Morgan to adjust all drips to normal saline as appropriate based on compatibility to avoid fluid shifts since D5 is osmotically active. The following intermittent IV drips/infusions have been adjusted to saline:  None at this time     Total IV fluid delivered to patient over last 24h: TBD; will assess in 24h    RPh will follow daily to ensure all new IVPBs + drips are in NS. Please call with questions.   Renny Chase PharmD, [facility]  Wireless: [id number]   5/17/2022 11:21 AM

## 2022-05-17 NOTE — PROCEDURES
swallow screen 2x with RN and coughing when consuming 3 successive swallows of water at the bedside. Pt had no residue remaining in valleculae or pyriform after the swallow with any consistency. Treatment Dx and ICD 10: 13.12   Patient Position: Lateral     Consistencies Administered: Regular;Pureed; Thin cup; Thin straw    Dysphagia Outcome Severity Scale: Level 5: Mild dysphagia- Distant supervision. May need one diet consistency restricted  Penetration-Aspiration Scale (PAS): 1 - Material does not enter the airway    Recommended Diet:  Solid consistency: Soft and Bite-Sized  Liquid consistency: Thin  Liquid administration via: Cup;Straw    Medication administration: PO    Safe Swallow Protocol:   Eat/Feed slowly;  Upright as possible for all oral intake;  Remain upright for 30-45 minutes after meals;  Small bites/sips; Check for pocketing of food on the Right      Recommendations/Treatment  Requires SLP Intervention: Yes   D/C Recommendations: To be determined  Postural Changes and/or Swallow Maneuvers: Upright 90 degrees;Upright 30 min after meal    Recommended Exercises:    Therapeutic Interventions: Patient/Family education;Diet tolerance monitoring     Education: Images and recommendations were reviewed with pt following this exam.   Patient Education: Results of MBS  Patient Education Response: Verbalizes understanding    Duration/Frequency of Treatment  Duration of Treatment: LOS  Frequency of Treatment: 1-3x  Safety Devices  Safety Devices in place: Yes (staff present to monitor for safety)  Type of devices: Call light within reach  Restraints Initially in Place: No      Goals:    1- The patient will tolerate instrumental swallowing procedure  5/17- goal met    New goal-  1-The patient will tolerate least restrictive diet without s/s aspiration or respiratory decline.        2- The pt/caregiver will demonstrate understanding of swallowing recommendations and concerns.   5/17- The pt was educated to purpose of the visit, anatomy and physiology of the swallow, concerns for aspiration, recommendation for MBS and a description of the procedure. The pt stated comprehension and agreement with MBS con't goal   6/17- second session- pt educated to the results of the MBS (with video review) diet recommendations and swallowing strategies. Pt stated comprehension. con't goal         Esophageal Phase  Esophageal Screen: WNL        Pain    denied pain      Plan:  Continue goals per POC  Recommended diet: Dysphagia III/soft and bite sized with thin liquids with strategies listed above  Make NPO if s/s aspiration emerge and alert SLP  Total treatment time:21  Discharge Plan: To be determined closer to discharge  Discussed with Reyna MERAZ   Needs within reach.        Bibiana Krueger [facility]- SLP  [id number]  Pg # [phone]  This document will serve as a discharge summary if pt discharge before next treatment   session      Therapy Time:   Individual Concurrent Group Co-treatment   Time In 1049         Time Out 1110         Minutes 21

## 2022-05-17 NOTE — PROGRESS NOTES
Patient returned to room from testing. Patient sitting up in chair. Denies any needs at this time. Chair alarm is on.

## 2022-05-17 NOTE — PROGRESS NOTES
Hospitalist Progress Note      PCP: No primary care provider on file. Date of Admission: 5/16/2022    Chief Complaint:     Chief Complaint   Patient presents with    Facial Droop     Pt reports right sided weakness with dinner last night and worsening symptoms since. Pt has right sided facial droop and right sided hand weakness (mild) and right arm drift that does not hit bed. Pt brought [facility] where she had a CT in route    Aphasia    Extremity Weakness       Subjective:  Patient seen and examined at the bedside. No complaints at this time.     Patient failed swallow, speech consulted, plan for MBS today  Neuro status unchanged  MRI showed left thalamic CVA  Neuro to see  Permissive hypertension overnight, will begin lowering blood pressure today    PFHS: reviewed as documented 5/16/2022, no changes unless noted above    Medications:  Reviewed    Infusion Medications    sodium chloride       Scheduled Medications    sodium chloride flush  10 mL IntraVENous 2 times per day    enoxaparin  40 mg SubCUTAneous Daily    sennosides-docusate sodium  1 tablet Oral BID     PRN Meds: perflutren lipid microspheres, hydrALAZINE, sodium chloride flush, sodium chloride, promethazine **OR** ondansetron, polyethylene glycol, acetaminophen **OR** acetaminophen      Intake/Output Summary (Last 24 hours) at 5/17/2022 0946  Last data filed at 5/17/2022 0846  Gross per 24 hour   Intake 10 ml   Output 500 ml   Net -490 ml       Physical Exam    BP (!) 153/43   Pulse 80   Temp 98.1 °F (36.7 °C) (Oral)   Resp 18   Ht 5' 4\" (1.626 m)   Wt 166 lb 3.2 oz (75.4 kg)   SpO2 98%   BMI 28.53 kg/m²     General appearance:  No acute distress, appears stated age  Eyes: Pupils equal, round, reactive to light, conjunctiva/corneas clear  Ears/Nose/Mouth/Throat: No external lesions or scars, hearing intact to voice  Neck: Trachea midline, no masses noted, no thyromegaly  Respiratory:  Non-labored breathing, clear to auscultation bilaterally  Cardiovascular: Regular rate and rhythm, no murmurs, gallops, or rubs  Abdomen: soft, non-tender, non-distended  Musculoskeletal: Warm, well perfused, no cyanosis or edema  Skin: No rashes  Psychiatric: A&Ox4, good insight and judgment    Labs:   Recent Labs     05/16/22  1513 05/17/22  0730   WBC 12.4* 9.7   HGB 11.2* 11.8*   HCT 33.5* 35.0*    250     Recent Labs     05/16/22  1513      K 4.0      CO2 25   BUN 8   CREATININE 0.7   CALCIUM 8.9     Recent Labs     05/16/22  1513   AST 15   ALT 11   BILITOT 0.4   ALKPHOS 74     Recent Labs     05/16/22  1513   INR 0.98     Recent Labs     05/16/22  1513   TROPONINI <0.01       Urinalysis:    No results found for: Dustin Rocker, BACTERIA, RBCUA, BLOODU, SPECGRAV, GLUCOSEU    Radiology:  MRI BRAIN WO CONTRAST   Final Result      Small recent infarct in the left thalamocapsular junction. No intracranial hemorrhage. Mild atrophy and mild-to-moderate chronic small vessel ischemic change. Probable tiny remote lacunar infarcts as detailed. XR CHEST PORTABLE   Final Result   Impression: Bilateral perihilar opacities and prominence of the pulmonary vasculature. CTA HEAD NECK W CONTRAST   Final Result      1. No large vessel occlusion. 2. No flow-limiting steno-occlusive disease. 50% stenosis of the right ICA. 20% stenosis of the left ICA. 3. Ill-defined 6 mm right apical nodule, favored be inflammatory. Recommend follow-up chest CT in 3 months.       FL MODIFIED BARIUM SWALLOW W VIDEO    (Results Pending)       Assessment/Plan:    Active Hospital Problems    Diagnosis Date Noted    Acute cerebrovascular accident (CVA) ([facility] [address] [I63.9] 05/16/2022     Priority: Medium       Plan:    #CVA  CT, CTA with findings as above  MRI showed small left thalamic capsule infarction  Echo pending  PT/OT  Failed bedside swallow, speech consulted, plan for MBS today  A1c and lipid panel pending, UDS not yet collected  Permissive hypertension for the first 24 hours, will start better BP control today  Continue ASA, statin, neurochecks    #Right apical lung nodule  Incidentally noted on CT, recommend 3-month follow-up imaging    #Schizophrenia  Well-controlled, continue home management    # Remainder of medical conditions  -home management except as above    DVT Prophylaxis: Lovenox  Diet: Diet NPO  Code Status: Full Code    PT/OT Eval Status: Ongoing    Dispo: Eben Martinez pending clinical improvement    Bisi Callaway MD

## 2022-05-17 NOTE — CONSULTS
Neurology / Cristina Pablo Note    Herminio Herr MD is requesting this consult. Reason for Consult: acute CVA  Admission Chief Complaint: focal weakness    History of Present Illness     Loco Wang is a 80 y.o. y/o female with PMH significant for HTN, elevated cholesterol, bipolar, schizophrenia, tobacco use p/w right sided weakness and facial droop. Sympyoms began at 6pm 05/15 and was initially attributed to new medication (lithium) and waited until morning. At that time weakness had not resolved and patient unable to walk. Called EMS. Here CTA was negative for LVO or bleed. MRI did show small left thalmocapsular stroke. Current daily smoker. On my evaluation, symptoms persist. She does not feel any improvement. REVIEW OF SYSTEMS:   Constitutional- No weight loss or fevers   Eyes- No diplopia. No photophobia. Ears/nose/throat- No dysphagia. + Dysarthria   Cardiovascular- No palpitations. No chest pain   Respiratory- No dyspnea. No Cough   Gastrointestinal- No Abdominal pain. No Vomiting. Genitourinary- No incontinence. No urinary retention   Musculoskeletal- No myalgia. No arthralgia   Skin- No rash. No easy bruising. Psychiatric- No depression. No anxiety   Endocrine- No diabetes. No thyroid issues. Hematologic- No bleeding difficulty. No fatigue   Neurologic- weakness in right arm and leg. Past Medical, Surgical, Family, and Social History   PAST MEDICAL HISTORY:  History reviewed. No pertinent past medical history. SURGICAL HISTORY:  History reviewed. No pertinent surgical history. FAMILY HISTORY & SOCIAL HISTORY:  Family history non-contributory  History reviewed. No pertinent family history.   Social History     Tobacco Use    Smoking status: Current Every Day Smoker     Packs/day: 2.00     Types: Cigarettes    Smokeless tobacco: Never Used   Substance Use Topics    Alcohol use: Never    Drug use: Never         Allergies & Outpatient Medications   ALLERGIES:  No Known Allergies  HOME MEDICATIONS:  Current Discharge Medication List      CONTINUE these medications which have NOT CHANGED    Details   divalproex (DEPAKOTE) 125 MG DR tablet Take 125 mg by mouth every evening      docusate sodium (COLACE) 100 MG capsule Take 100 mg by mouth every evening      estradiol (ESTRACE) 0.1 MG/GM vaginal cream Place 0.5 g vaginally every other day      lithium 150 MG capsule Take 150 mg by mouth every other day Takes at night      HYDROcodone-acetaminophen (NORCO) 5-325 MG per tablet Take 1 tablet by mouth every 8 hours as needed. vitamin D 25 MCG (1000 UT) CAPS Take 1 capsule by mouth daily      atorvastatin (LIPITOR) 40 MG tablet Take 40 mg by mouth at bedtime      b complex vitamins capsule Take 1 capsule by mouth daily      clotrimazole-betamethasone (LOTRISONE) 1-0.05 % cream Apply topically 2 times daily Apply topically 2 times daily.       fluticasone (FLONASE) 50 MCG/ACT nasal spray 1 spray by Each Nostril route daily      furosemide (LASIX) 20 MG tablet Take 20 mg by mouth daily      lisinopril (PRINIVIL;ZESTRIL) 20 MG tablet Take 20 mg by mouth daily      memantine (NAMENDA) 5 MG tablet Take 5 mg by mouth 2 times daily       metoprolol (LOPRESSOR) 100 MG tablet Take 100 mg by mouth 2 times daily      miSOPROStol (CYTOTEC) 100 MCG tablet Take 100 mcg by mouth in the morning and at bedtime      omeprazole (PRILOSEC) 20 MG delayed release capsule Take 40 mg by mouth daily         STOP taking these medications       estradiol (VIVELLE) 0.1 MG/24HR Comments:   Reason for Stopping:         oxyCODONE-acetaminophen (PERCOCET) 5-325 MG per tablet Comments:   Reason for Stopping:                 Physical Exam   PHYSICAL EXAM:  Vitals:    05/17/22 0715 05/17/22 0829 05/17/22 1000 05/17/22 1052   BP: (!) 153/43  (!) 145/66 (!) 144/66   Pulse: 80  88    Resp: 16 18 16    Temp: 98.1 °F (36.7 °C)  97.6 °F (36.4 °C)    TempSrc: Oral  Oral    SpO2: 97% 98% 94% 94%   Weight:       Height: General: Alert, no distress, well-nourished  Neurologic  Mental status:   orientation to person, place, time, situation   Attention intact as able to attend well to the exam     Language +dysarthria   Comprehension intact; follows simple commands    Cranial nerves:   CN2: Visual fields full w/o extinction on confrontational testing  CN 3,4,6: Pupils equal and reactive to light, extraocular muscles intact  CN5: Facial sensation symmetric   CN7: Lower facial droop affecting right side. Forehead spared. CN8: Hearing symmetric to spoken voice  CN9: Palate elevated symmetrically  CN11: Traps full strength on shoulder shrug  CN12: Tongue midline with protrusion    Motor Exam:   R  L    Deltoid 4  5   Biceps 4 5   Triceps 4 5   Wrist extension  4 5   Interossei 4 5      R  L    Hip flexion  4  5   Hip extension  4 5   Knee flexion  4 5   Knee extension  4 5   Ankle dorsiflexion  4 5   Ankle plantar flexion  4 5     Deep tendon reflexes:    R  L    Biceps  3  3        Brachioradialis  3 3   Patellar  3 3   Achilles  3 3          Sensory: light touch intact and symmetric in all 4 extremities. Diminished sensation over right arm. Cerebellar/coordination: finger nose finger normal without ataxia  Tone: normal in all 4 extremities  Gait: deferred for safety    OTHER SYSTEMS:  Cardiovascular: Warm, appears well perfused   Respiratory: Easy, non-labored respiratory pattern   Abdominal: Abdomen is without distention   Extremities: Upper and lower extremities are atraumatic in appearance without deformity. No swelling or erythema. Diagnostic Testing Results   IMAGES:  Images personally reviewed and agree w/ radiology interpretation. Head CT w/o Contrast:      CTA of Head / Neck w/ Contrast:  1. No large vessel occlusion. 2. No flow-limiting steno-occlusive disease. 50% stenosis of the right ICA. 20% stenosis of the left ICA. 3. Ill-defined 6 mm right apical nodule, favored be inflammatory.  Recommend follow-up chest CT in 3 months. MRI Brain w/o Contrast:  Small recent infarct in the left thalamocapsular junction. No intracranial hemorrhage.       Mild atrophy and mild-to-moderate chronic small vessel ischemic change. Probable tiny remote lacunar infarcts as detailed. ECHO with Bubble: (09/29/2021)  Overall left ventricular ejection fraction is estimated to be 55-60%. Left ventricular systolic function is normal. (LVEF>/=55%)   Indeterminate diastolic function. The left atrium is moderately dilated. Left atrium is moderately (34-39 ml/m2) dilated. The mitral valve leaflets are mildly thickened in appearance. There is moderate mitral regurgitation. Aortic Valve leaflets appear thickened. Moderate aortic regurgitation. Right ventricular systolic pressure is normal at <35 mmHg. Mild tricuspid regurgitation is present      LABS:  All results below personally reviewed. Pertinent positives & negatives are addressed in Impression & Recommendations below. LABS   Metabolic Panel Recent Labs     05/16/22  1513 05/17/22  0730    143   K 4.0 3.7    105   CO2 25 28   BUN 8 11   CREATININE 0.7 0.6   GLUCOSE 107* 91   CALCIUM 8.9 9.4   LABALBU 3.8  --    ALKPHOS 74  --    ALT 11  --    AST 15  --       CBC / Coags Recent Labs     05/16/22  1513 05/17/22  0730   WBC 12.4* 9.7   RBC 3.31* 3.43*   HGB 11.2* 11.8*   HCT 33.5* 35.0*    250   INR 0.98  --       Other Recent Labs     05/16/22  1513   LDLCALC 82   TRIG 139     No results for input(s): PHENYTOIN, KEPPRA, LACOSA, LAMO, VALPROATE, LACTSEPSIS, LACTA in the last 72 hours.        CURRENT SCHEDULED MEDICATIONS   Inpatient Medications     [START ON 5/18/2022] aspirin, 81 mg, Oral, Daily    atorvastatin, 40 mg, Oral, Nightly    divalproex, 125 mg, Oral, QPM    fluticasone, 1 spray, Each Nostril, Daily    furosemide, 20 mg, Oral, Daily    lisinopril, 20 mg, Oral, Daily    lithium, 150 mg, Oral, Every Other Day   memantine, 5 mg, Oral, BID    metoprolol, 100 mg, Oral, BID    miSOPROStol, 100 mcg, Oral, BID    Vitamin D, 1,000 Units, Oral, Daily    sodium chloride flush, 10 mL, IntraVENous, 2 times per day    enoxaparin, 40 mg, SubCUTAneous, Daily    sennosides-docusate sodium, 1 tablet, Oral, BID   Infusions    sodium chloride        Antibiotics   Recent Abx Admin      No antibiotic orders with administrations found. IMPRESSION & RECOMMENDATIONS     IMPRESSION:  Acute ischemic stroke, likely small vessel    RECOMMENDATIONS:  - daily aspirin  - high dose statin  - blood pressure control.   - smoking cessation.   - PTOT, may require rehab. - SLP eval completed.    - ECHO w bubble pending, not necessary from neuro perspective      Jamie Epley, MD PGY3    Will discuss with Dr Lucia Souza    Neurology [facility]   Neurology Line: [phone]  [employer]: [facility] Neurology & Neuro Critical [facility] NPs  5/17/2022 1:24 PM

## 2022-05-17 NOTE — PROGRESS NOTES
RN received bedside report when patient was transferred to [address] [place] [address] at 1900. Patient A&Ox4, no complaints of pain, presents with slurred speech, right sided weakness, /62, MD at bedside and aware of pt assessment and vitals. Pt NIHSS 7. Pure-wick applied due to limited mobility at this time. Skin intact. Bed alarm on and bed in low and locked position. Call light and belongings within reach. Patient reports no further needs at this time. MD states speech therapy would be consulted to preform swallow evaluation and patient is to remain NPO until further notice. MD also aware of elevated blood pressure and placed orders for PRN hydralazine for SBP above 220. RN will update MD with any changes or concerns for further orders or updated plan of care.

## 2022-05-17 NOTE — PROGRESS NOTES
Physical Therapy  Facility/Department: [place] [address]  Physical Therapy Initial Assessment/Treatment    Name: Madonna Bruce  : 1940  MRN: [id number]  Date of Service: 2022    Discharge Recommendations:Andria Salmeron scored a  on the AM-PAC short mobility form. Current research shows that an AM-PAC score of 17 or less is typically not associated with a discharge to the patient's home setting. Based on the patient's AM-PAC score and their current functional mobility deficits, it is recommended that the patient have 3-5 sessions per week of Physical Therapy at d/c to increase the patient's independence. Please see assessment section for further patient specific details. If patient discharges prior to next session this note will serve as a discharge summary. Please see below for the latest assessment towards goals. PT Equipment Recommendations  Equipment Needed: No      Patient Diagnosis(es): The encounter diagnosis was Cerebrovascular accident (CVA), unspecified mechanism ([facility] [address] Past Medical History:  has no past medical history on file. Past Surgical History:  has no past surgical history on file. Assessment   Body Structures, Functions, Activity Limitations Requiring Skilled Therapeutic Intervention: Decreased functional mobility ; Decreased ADL status; Decreased ROM; Decreased body mechanics; Decreased strength;Decreased endurance;Decreased balance;Decreased coordination;Decreased posture  Assessment: Pt is a 79 yo female admitted 22 for CVA. Her reported baseline is independent with ADLs, bed mobility, transfers, and gait with RW, but requiring assistance for homemaking and navigating stairs. Pt presenting below reported baseline today, requiring assist x1 for all functional mobility. Pt drifting to L throughout ambulation with RW, but was able to self correct with verbal cues. Significant fatigue evident when attempting stairs, but pt able to perform without LOB.  Pt planning to return home with 24H assist from grandson upon d/c. If home, recommend 24H assist, RW for all gait, and HHPT to increase functional independence. Despite higher AMPAC score, pt may would benefit from continued skilled IP PT at discharge. Will follow. Treatment Diagnosis: decreased functional mobility  Therapy Prognosis: Fair (comorbidities)  Decision Making: Medium Complexity  Requires PT Follow-Up: Yes  Activity Tolerance  Activity Tolerance: Patient limited by endurance; Patient limited by fatigue     Plan   Plan  Plan: 5-7 times per week  Current Treatment Recommendations: Strengthening,ROM,Balance training,Functional mobility training,Transfer training,Endurance training,Gait training,Stair training  Safety Devices  Type of Devices: Call light within reach,Chair alarm in place,Gait belt,Left in chair,Nurse notified  Restraints  Restraints Initially in Place: No     Restrictions  Position Activity Restriction  Other position/activity restrictions: up as tolerated     Subjective   Pain: denies  General  Chart Reviewed: Yes  Patient assessed for rehabilitation services?: Yes  Additional Pertinent Hx: Pt is a 81 yo female admitted 5/16/22 for CVA. 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 Her granddaughter comes by for most of the day, everyday. She had a hip replacement early in April that she was no longer seeing PT for, reporting that \"everything went well\". Subjective  Subjective: Pt supine in bed upon arrival. She was alert, agreeable to therapy, and alone in the room.          Social/Functional History  Social/Functional History  Lives With: Family (grandson)  Type of Home: House  Home Layout: Two level,Able to Live on Main level with bedroom/bathroom,Performs ADL's on one level,Laundry in basement  Home Access: Stairs to enter with rails  Entrance Stairs - Number of Steps: 5  Entrance Stairs - Rails: Both  Bathroom Shower/Tub: Tub/Shower unit  Bathroom Toilet:  (handicap toilet that goes over toilet, grab bars)  Bathroom Equipment: Shower chair,Grab bars in shower,Grab bars around toilet  Bathroom Accessibility: Walker accessible  Home Equipment: Gentry Pattie, 4 wheeled,Reacher,Cane,Hospital bed,Walker, rolling (pt reported 4WW initially, but said \"my walker looks exactly like that\" referring to RW used during tx)  Has the patient had two or more falls in the past year or any fall with injury in the past year?: No (one fall, no injury)  ADL Assistance: Independent  Homemaking Assistance: Needs assistance (grandson and granddaughter help with cooking, cleaning, laundry. has groceries delivered)  Ambulation Assistance: Independent  Transfer Assistance: Independent  Active : No  Patient's  Info: grandkids drive to appointments  Mode of Transportation: Family  Occupation: Retired  Type of Occupation: [employer] work  Leisure & Hobbies: crossword puzzles  Additional Comments: grand son lives with pt full time, granddaughter comes over most of the day every day.   [facility]    Vision/Hearing  Vision Exceptions: Wears glasses for reading  Hearing: Exceptions to [facility]  Hearing Exceptions: Hard of hearing/hearing concerns (\"i need a hearing aid\")      Cognition   Orientation  Overall Orientation Status: Within Normal Limits  Orientation Level: Oriented X4  Cognition  Overall Cognitive Status: WNL     Objective   BP: (!) 144/66 (144/66 in chair post tx, 122/52 supine pre tx)  SpO2: 94 %       Observation/Palpation  Posture: Fair (kyphosis)  Observation: dejah in place        AROM RLE (degrees)  RLE AROM: WFL  RLE General AROM: minimal knee extension and hip flexion deficit, DF WNL  AROM LLE (degrees)  LLE AROM : WNL     Strength RLE  Strength RLE: WFL  Comment: DF 4+/5, knee extension 4/5, hip flexion 4-/5  Strength LLE  Strength LLE: WFL  Comment: 4+/5 DF, 4/5 all others           Bed mobility  Rolling to Right: Stand by assistance (L UE assist on bedrail, HOB elevated)  Supine to Sit: Moderate assistance (mod A at trunk, HOB elevated)  Scooting: Contact guard assistance (CGA at EOB, SBA in chair)    Transfers  Sit to Stand: Contact guard assistance;Minimal Assistance (5x, from EOB CGA, from bedside chair 2x CGA, from w/c 2x min A (pt reporting fatigue), cues for UE sequencing)  Stand to sit: Contact guard assistance;Stand by assistance (to bedside chair 2x SBA, to w/c CGA, to bedside chair CGA, cues for UE sequencing)    Ambulation  Surface: level tile  Device: Rolling Walker  Assistance: Contact guard assistance  Quality of Gait: constant drift to L, pt able to self correct after initial verbal cues, steady  Gait Deviations: Slow Kaelyn (drifting to L)  Distance: 4' to bedside chair, 79' down meier and back to chair, 4' stairs<-->w/c x2, 4' to bedside chair  Comments: L drift becoming more pronounced as pt became more fatigued  Stairs/Curb  Stairs?: Yes  Stairs  # Steps : 3 (3 steps ascending and descending)  Stairs Height: 6\"  Rails: Bilateral  Device: No Device  Assistance: Minimal assistance  Comment: max cues for foot placement, step-to gait, pt noticably fatigued     Balance  Posture: Fair (kyphosis)  Sitting - Static: Good (sat EOB CGA progressing to SBA without LOB)  Sitting - Dynamic: Good (sat EOB CGA progressing to SBA without LOB)  Standing - Static: Good (stood in RW CGA without LOB)  Standing - Dynamic: Good (stood in RW CGA without LOB)                                                             AM-PAC Score  AM-PAC Inpatient Mobility Raw Score : 18 (05/17/22 1054)  AM-PAC Inpatient T-Scale Score : 43.63 (05/17/22 1054)  Mobility Inpatient CMS 0-100% Score: 46.58 (05/17/22 1054)  Mobility Inpatient CMS G-Code Modifier : CK (05/17/22 1054)          Goals  Short Term Goals  Time Frame for Short term goals: by d/c  Short term goal 1: Pt will perform all bed mobility CGA  Short term goal 2: Pt will perform sit<-->stand with supervision  Short term goal 3: Pt will ambulate 120' with supervision with LRAD  Short term goal 4: Pt will navigate 5 stairs SBA with LRAD  Patient Goals   Patient goals : to return home       Education  Patient Education  Education Given To: Patient  Education Provided: Role of Therapy;IADL Safety  Education Provided Comments: Pt educated on need for RW during gait, 24H assist when returning home, benefit of home PT, sequencing with stair navigation and gait, and on benefit of functional mobility  Education Method: Verbal;Demonstration  Barriers to Learning: None  Education Outcome: Verbalized understanding      Therapy Time   Individual Concurrent Group Co-treatment   Time In 0932         Time Out 1029         Minutes 57         Timed Code Treatment Minutes: 42 Minutes     Total treatment time: 57 minutes  SARIAH Perez    Therapist was present, directed the patient's care, made skilled judgement, and was responsible for assessment and treatment of the patient.     Rodrigue Yan, PT

## 2022-05-17 NOTE — PROGRESS NOTES
Occupational Therapy  Facility/Department: [facility] 5T ORTHO/NEURO  Occupational Therapy Initial Assessment/Tx Note    Name: Erika Lee  : 1940  MRN: [id number]  Date of Service: 2022    Assessment: pt admitted on  and is being treated for a CVA. pt from home and is independent with most adls and functional mobility with RW at baseline. Currently pt activity tolerance and adl participation is limited by R sided weakness, fatigue, and coordination impaiments. pt is below reported baseline and requires increased assistance for functional mobility w/ RW and self-care. Recommend pt continue IP OT to maximize independence and strength. Pt's  is a resident at a local nursing home, and family is interested in pursuing SNF there. Will follow POC throughout stay. Discharge Recommendations: Erika Lee scored a 15/24 on the AM-PAC ADL Inpatient form. Current research shows that an AM-PAC score of 17 or less is typically not associated with a discharge to the patient's home setting. Based on the patient's AM-PAC score and their current ADL deficits, it is recommended that the patient have 3-5 sessions per week of Occupational Therapy at d/c to increase the patient's independence. Please see assessment section for further patient specific details. If patient discharges prior to next session this note will serve as a discharge summary. Please see below for the latest assessment towards goals. OT Equipment Recommendations  Equipment Needed: No       Patient Diagnosis(es): The encounter diagnosis was Cerebrovascular accident (CVA), unspecified mechanism ([facility] [address] Past Medical History:  has no past medical history on file. Past Surgical History:  has no past surgical history on file. Treatment Diagnosis: Decreased functional mobility, ADL status, ROM, strength, Endurance, Coordination      Assessment   Performance deficits / Impairments: Decreased functional mobility ; Decreased ADL status; Decreased ROM; Decreased strength;Decreased safe awareness;Decreased endurance;Decreased fine motor control;Decreased coordination    Treatment Diagnosis: Decreased functional mobility, ADL status, ROM, strength, Endurance, Coordination  Prognosis: Good  Decision Making: High Complexity  REQUIRES OT FOLLOW-UP: Yes  Activity Tolerance  Activity Tolerance: Patient Tolerated treatment well;Patient limited by fatigue  Activity Tolerance Comments: Began to feel dizzy in bathroom, pt was helped back into bed with quick improvement. RN aware. Plan   Plan  Times per Week: 5-7x  Times per Day: Daily  Current Treatment Recommendations: Strengthening,ROM,Balance training,Functional mobility training,Endurance training,Gait training,Cognitive reorientation,Neuromuscular re-education,Positioning,Safety education & training,Self-Care / ADL,Patient/Caregiver education & training     Restrictions  Restrictions/Precautions  Restrictions/Precautions: Up as Tolerated  Position Activity Restriction  Other position/activity restrictions: up as tolerated    Subjective   General  Patient assessed for rehabilitation services?: Yes  Additional Pertinent Hx: pt is a 80 y.o female admitted on 5/16 p/w right sided weakness and facial droop. 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 pt was pleasant and cooperative. pt has had many tests and sessions done today and was tired from it. General Comment  Comments: pt denied pain.      Social/Functional History  Social/Functional History  Lives With: Family (grandson)  Type of Home: House  Home Layout: Two level,Able to Live on Main level with bedroom/bathroom,Performs ADL's on one level,Laundry in basement  Home Access: Stairs to enter with rails  Entrance Stairs - Number of Steps: 5  Entrance Stairs - Rails: Both  Bathroom Shower/Tub: Tub/Shower unit  Bathroom Toilet:  (handicap toilet that goes over toilet, grab bars)  Bathroom Equipment: Shower chair,Grab bars in shower,Grab bars around toilet  Bathroom Accessibility: Walker accessible  Home Equipment: Hermon Southward, 4 wheeled,Reacher,Cane,Hospital bed,Walker, rolling (pt reported 4WW initially, but said \"my walker looks exactly like that\" referring to RW used during tx)  Has the patient had two or more falls in the past year or any fall with injury in the past year?: No (one fall, no injury)  ADL Assistance: Independent  Homemaking Assistance: Needs assistance (grandson and granddaughter help with cooking, cleaning, laundry. has groceries delivered)  Ambulation Assistance: Independent  Transfer Assistance: Independent  Active : No  Patient's  Info: grandkiy prime drive to appointments  Mode of Transportation: Family  Occupation: Retired  Type of Occupation: [employer] work  Leisure & Hobbies: crosswAzuro puzzles  Additional Comments: grand son lives with pt full time, granddaughter comes over most of the day every day.  [facility]       Objective        Safety Devices  Type of Devices: Gait belt;Nurse notified;Call light within reach; Left in bed;Bed alarm in place  Restraints  Restraints Initially in Place: No     Balance  Sitting:  (CGA unsupported, Supervision supported)  Standing:  (CGA static, Min A dynamic. Stood for pericare/clothing management and at edge of chair. 3 min + 2 min)  Gait  Overall Level of Assistance: Minimum assistance (Slow and required verbal cues for turns.  pt fatigued after toileting, unable to tolerate further ambulation.)  Assistive Device: Walker, rolling    Toilet Transfers  Toilet - Technique: Ambulating  Equipment Used: Grab bars  Toilet Transfer: Moderate assistance     AROM: Grossly decreased, non-functional (R shoulder: 90 degrees, L shoulder: WFL)  Strength: Grossly decreased, non-functional (: 3+/5 Right hand, 4-/5 Left hand; R Shoulder flexion: 3-/5, L shoulder flexion: 4/5 R Shoulder extension: 4-/5. L shoulder extension 5/5)  Coordination: Grossly decreased, non-functional (finger opposition: R decreased speed and accuracy  L: decreased speed and accuracy (more functional than R). )  Sensation: Intact (reported numbness when symptoms first occured, but subjectively back to baseline)    ADL  Feeding: Minimal assistance;Setup; Beverage management (beverage assistance and decreased accuracy and stability when using R hand,)  Grooming: Stand by assistance (in bed washed hands w/  wipe)  LE Dressing: Maximum assistance (don/doff brief)  Toileting: Moderate assistance     Activity Tolerance  Activity Tolerance: Patient limited by endurance; Patient limited by fatigue  Bed mobility  Sit to Supine: Moderate assistance;2 Person assistance (HOB elevated, BLE assist, required x2 to adjust in bed)     Transfers  Sit to stand: Contact guard assistance (from chair)  Stand to sit: Contact guard assistance (to bed)  Transfer Comments: verbal cues for hand placement    Vision - Basic Assessment  Visual Field Cut: No (WFL)     Cognition  Overall Cognitive Status: Exceptions  Following Commands: Follows one step commands with increased time  Safety Judgement: Decreased awareness of need for assistance  Memory: impaired long term memory (notes hip surgery and eye surgery 1-2 months ago, granddaughter clarified these took place 2 years ago)    Perception  Overall Perceptual Status: Impaired  Initiation: Cues to initiate tasks  Motor Planning:  (Impaired)               Education Given To: Patient; Family  Education Provided: Role of Therapy;Plan of Care;ADL Adaptive Strategies;Transfer Training;Energy Conservation; Family Education  Education Method: Demonstration;Verbal  Barriers to Learning: Cognition  Education Outcome: Verbalized understanding;Continued education needed                 AM-PAC Score        AM-PAC Inpatient Daily Activity Raw Score: 15 (05/17/22 1441)  AM-PAC Inpatient ADL T-Scale Score : 34.69 (05/17/22 1441)  ADL Inpatient CMS 0-100% Score: 56.46 (05/17/22 1441)  ADL Inpatient CMS G-Code Modifier : CK (05/17/22 1441)    Goals  Short Term Goals  Time Frame for Short term goals: by d/c  Short Term Goal 1: complete transfer from bed to toliet/BSC w/ SBA- not met  Short Term Goal 2: increase activity tolerance in standing to 8 min - not met  Short Term Goal 3: complete LB dressing with min a - not met  Patient Goals   Patient goals : to get better       Therapy Time   Individual Concurrent Group Co-treatment   Time In 1330         Time Out 1428         Minutes 58           Timed Code Tx Min: 43  Total Tx Min: 62    Therapist was present, directed the patient's care, made skilled judgment, and was responsible for assessment and treatment of the patient. If patient is discharged prior to next treatment, this not will serve as the discharge summary.          Escobar Kaminski S/OT   (Prashanth Guerrero, OTR/L, [id number])

## 2022-05-17 NOTE — PROGRESS NOTES
Patient finished barium swallow eval. Speech states that she did not aspirate during testing however, they do recommend patient be placed upright for all meals, soft/bite sized food and also for patient to remain upright for 1/2 hour after eating. Physician notified. Awaiting diet orders.

## 2022-05-17 NOTE — PROGRESS NOTES
Speech Language Pathology  Facility/Department: [facility] 5T ORTHO/NEURO  Initial Speech/Language/Cognitive Assessment    NAME: Gabbie Cheng  : 1940   MRN: [id number]  ADMISSION DATE: 2022  ADMITTING DIAGNOSIS: has Acute cerebrovascular accident (CVA) ([address] on their problem list.  DATE ONSET: 22    Date of Eval: 2022   Evaluating Therapist: Janina Gomez, SLP    LORI brain 22  Impression       Small recent infarct in the left thalamocapsular junction. No intracranial hemorrhage.       Mild atrophy and mild-to-moderate chronic small vessel ischemic change. Probable tiny remote lacunar infarcts as detailed.            Primary Complaint: pt c/o being hungry    Pain:  Denied pain     Assessment:    Pt presents with mild dysarthria with mild cognitive impairment. Pt presents with right facial droop, tongue deviation to the left upon protrusion and impaired articulatory precision at the conversation level. Pt was able to follow commands and respond to questions appropriately. No instances of anomia noted. Pt was oriented x3 but displayed inflexibility of thought when asked to provide multiple solutions to routine problem had difficulty with basic math calculations relating to money and time. Pt was able to recall 3/3 words with a 5 minute delay. No difficulty noted with visual scanning for reading task. Recommendations:  Requires SLP Intervention: Yes  Duration of Treatment: LOS  D/C Recommendations:  To be determined       Plan:   Goals: pt will be seen 1-3x per week  The pt will:  1- state 2 strategies to improve intelligibility  2- utilize strategies to improve intelligibility at the conversation level with mod cues  3-perform graded problem solving tasks with 80% accuracy  4- perform basic functional math tasks relating to money and time with 70% accuracy       Patient/family involved in developing goals and treatment plan: yes    Subjective:   Previous level of function and limitations: pt reports living with her 2 grandchildren   General  Chart Reviewed: Yes  Family / Caregiver Present: No     Vision  Vision: Within Functional Limits  Hearing  Hearing: Exceptions to [facility]  Hearing Exceptions: Hard of hearing/hearing concerns           Objective:     Oral/Motor  Oral Hygiene: Moist;Clean    Auditory Comprehension  Comprehension: Within Functional Limits         Expression  Primary Mode of Expression: Verbal    Verbal Expression  Verbal Expression: Within functional limits    Written Expression  Dominant Hand: Right         Pragmatics/Social Functioning  Pragmatics: Within functional limits    Cognition:      Orientation  Overall Orientation Status: Impaired  Orientation Level: Oriented X4  Attention  Attention: Within Functional Limits  Memory  Memory: Exceptions to [facility]  Short-term Memory: Mild  Problem Solving  Problem Solving: Exceptions to [facility]  Simple Functional Tasks: Mild  Verbal Reasoning Skills: Mild  Numeric Reasoning  Numeric Reasoning: Exceptions to [facility]   Calculations: Mild  Money Management: Mild  Time: Mild  Safety/Judgment  Safety/Judgment: Exceptions to [facility]  Insight: Mild    Impulsive: Mild  Flexibility of Thought: Mild            Prognosis:  Speech Therapy Prognosis  Prognosis: Good  Individuals consulted  Consulted and agree with results and recommendations: Patient;RN  RN Name: Eran Barajas    Education:  Patient Education: Role of SLP  Patient Education Response: Verbalizes understanding  Safety Devices in place: Yes  Type of devices: Call light within reach       Pt's goal:to improve communication      Plan:  Continue goals per POC  Total treatment time:14  Pt's discharge plan:to home  Discharge Plan: To be determined closer to discharge  Discussed with Reyna MERAZ   Needs within reach.        Esteban Garrison, [address] [facility] [address] [place] [address]  Pg # [phone]  This document will serve as a discharge summary if pt discharge before next treatment   session        Therapy Time:   Individual

## 2022-05-18 ENCOUNTER — HOSPITAL ENCOUNTER (INPATIENT)
Age: 82
LOS: 9 days | Discharge: NURSING, REHAB OR LONG-TERM CARE | DRG: 057 | End: 2022-05-27
Attending: PHYSICAL MEDICINE & REHABILITATION | Admitting: PHYSICAL MEDICINE & REHABILITATION
Payer: MEDICARE

## 2022-05-18 VITALS
WEIGHT: 166.2 LBS | HEIGHT: 64 IN | TEMPERATURE: 97.4 F | SYSTOLIC BLOOD PRESSURE: 147 MMHG | RESPIRATION RATE: 18 BRPM | BODY MASS INDEX: 28.38 KG/M2 | OXYGEN SATURATION: 96 % | DIASTOLIC BLOOD PRESSURE: 69 MMHG | HEART RATE: 51 BPM

## 2022-05-18 DIAGNOSIS — I63.9 ACUTE ISCHEMIC STROKE (HCC): Primary | ICD-10-CM

## 2022-05-18 LAB
ESTIMATED AVERAGE GLUCOSE: 88.2 MG/DL
HBA1C MFR BLD: 4.7 %
SARS-COV-2, NAAT: NOT DETECTED

## 2022-05-18 PROCEDURE — 92507 TX SP LANG VOICE COMM INDIV: CPT

## 2022-05-18 PROCEDURE — 87635 SARS-COV-2 COVID-19 AMP PRB: CPT

## 2022-05-18 PROCEDURE — 6370000000 HC RX 637 (ALT 250 FOR IP): Performed by: STUDENT IN AN ORGANIZED HEALTH CARE EDUCATION/TRAINING PROGRAM

## 2022-05-18 PROCEDURE — 94761 N-INVAS EAR/PLS OXIMETRY MLT: CPT

## 2022-05-18 PROCEDURE — 2580000003 HC RX 258: Performed by: INTERNAL MEDICINE

## 2022-05-18 PROCEDURE — 97535 SELF CARE MNGMENT TRAINING: CPT

## 2022-05-18 PROCEDURE — 99223 1ST HOSP IP/OBS HIGH 75: CPT | Performed by: PHYSICAL MEDICINE & REHABILITATION

## 2022-05-18 PROCEDURE — 6360000002 HC RX W HCPCS: Performed by: INTERNAL MEDICINE

## 2022-05-18 PROCEDURE — 97530 THERAPEUTIC ACTIVITIES: CPT

## 2022-05-18 PROCEDURE — 6370000000 HC RX 637 (ALT 250 FOR IP): Performed by: PHYSICAL MEDICINE & REHABILITATION

## 2022-05-18 PROCEDURE — 6370000000 HC RX 637 (ALT 250 FOR IP): Performed by: INTERNAL MEDICINE

## 2022-05-18 PROCEDURE — 92526 ORAL FUNCTION THERAPY: CPT

## 2022-05-18 PROCEDURE — 1280000000 HC REHAB R&B

## 2022-05-18 PROCEDURE — 94669 MECHANICAL CHEST WALL OSCILL: CPT

## 2022-05-18 RX ORDER — LISINOPRIL 20 MG/1
20 TABLET ORAL DAILY
Status: DISCONTINUED | OUTPATIENT
Start: 2022-05-18 | End: 2022-05-22

## 2022-05-18 RX ORDER — LITHIUM CARBONATE 300 MG
150 TABLET ORAL EVERY OTHER DAY
Status: DISCONTINUED | OUTPATIENT
Start: 2022-05-20 | End: 2022-05-27 | Stop reason: HOSPADM

## 2022-05-18 RX ORDER — POLYETHYLENE GLYCOL 3350 17 G/17G
17 POWDER, FOR SOLUTION ORAL DAILY PRN
Status: CANCELLED | OUTPATIENT
Start: 2022-05-18

## 2022-05-18 RX ORDER — VITAMIN B COMPLEX
1000 TABLET ORAL DAILY
Status: DISCONTINUED | OUTPATIENT
Start: 2022-05-18 | End: 2022-05-27 | Stop reason: HOSPADM

## 2022-05-18 RX ORDER — ENOXAPARIN SODIUM 100 MG/ML
40 INJECTION SUBCUTANEOUS DAILY
Status: CANCELLED | OUTPATIENT
Start: 2022-05-18

## 2022-05-18 RX ORDER — METOPROLOL TARTRATE 100 MG/1
100 TABLET ORAL 2 TIMES DAILY
Status: CANCELLED | OUTPATIENT
Start: 2022-05-18

## 2022-05-18 RX ORDER — POLYETHYLENE GLYCOL 3350 17 G/17G
17 POWDER, FOR SOLUTION ORAL DAILY PRN
Status: DISCONTINUED | OUTPATIENT
Start: 2022-05-18 | End: 2022-05-27 | Stop reason: HOSPADM

## 2022-05-18 RX ORDER — ONDANSETRON 2 MG/ML
4 INJECTION INTRAMUSCULAR; INTRAVENOUS EVERY 6 HOURS PRN
Status: CANCELLED | OUTPATIENT
Start: 2022-05-18

## 2022-05-18 RX ORDER — ONDANSETRON 2 MG/ML
4 INJECTION INTRAMUSCULAR; INTRAVENOUS EVERY 6 HOURS PRN
Status: DISCONTINUED | OUTPATIENT
Start: 2022-05-18 | End: 2022-05-27 | Stop reason: HOSPADM

## 2022-05-18 RX ORDER — MISOPROSTOL 100 UG/1
100 TABLET ORAL 2 TIMES DAILY
Status: CANCELLED | OUTPATIENT
Start: 2022-05-18

## 2022-05-18 RX ORDER — PROMETHAZINE HYDROCHLORIDE 12.5 MG/1
12.5 TABLET ORAL EVERY 6 HOURS PRN
Status: CANCELLED | OUTPATIENT
Start: 2022-05-18

## 2022-05-18 RX ORDER — LITHIUM CARBONATE 150 MG/1
150 CAPSULE ORAL EVERY OTHER DAY
Status: CANCELLED | OUTPATIENT
Start: 2022-05-18

## 2022-05-18 RX ORDER — ASPIRIN 81 MG/1
81 TABLET, CHEWABLE ORAL DAILY
Status: DISCONTINUED | OUTPATIENT
Start: 2022-05-18 | End: 2022-05-27 | Stop reason: HOSPADM

## 2022-05-18 RX ORDER — OLANZAPINE 15 MG/1
15 TABLET ORAL NIGHTLY
Status: ON HOLD | COMMUNITY
End: 2022-05-27 | Stop reason: HOSPADM

## 2022-05-18 RX ORDER — PROMETHAZINE HYDROCHLORIDE 25 MG/1
12.5 TABLET ORAL EVERY 6 HOURS PRN
Status: DISCONTINUED | OUTPATIENT
Start: 2022-05-18 | End: 2022-05-27 | Stop reason: HOSPADM

## 2022-05-18 RX ORDER — MEMANTINE HYDROCHLORIDE 5 MG/1
5 TABLET ORAL 2 TIMES DAILY
Status: CANCELLED | OUTPATIENT
Start: 2022-05-18

## 2022-05-18 RX ORDER — MEMANTINE HYDROCHLORIDE 5 MG/1
5 TABLET ORAL 2 TIMES DAILY
Status: DISCONTINUED | OUTPATIENT
Start: 2022-05-18 | End: 2022-05-27 | Stop reason: HOSPADM

## 2022-05-18 RX ORDER — ATORVASTATIN CALCIUM 80 MG/1
80 TABLET, FILM COATED ORAL NIGHTLY
Status: CANCELLED | OUTPATIENT
Start: 2022-05-18

## 2022-05-18 RX ORDER — ASPIRIN 81 MG/1
81 TABLET, CHEWABLE ORAL DAILY
Status: CANCELLED | OUTPATIENT
Start: 2022-05-18

## 2022-05-18 RX ORDER — LITHIUM CARBONATE 150 MG/1
150 CAPSULE ORAL EVERY OTHER DAY
Status: DISCONTINUED | OUTPATIENT
Start: 2022-05-18 | End: 2022-05-18 | Stop reason: HOSPADM

## 2022-05-18 RX ORDER — ACETAMINOPHEN 325 MG/1
650 TABLET ORAL EVERY 4 HOURS PRN
Status: CANCELLED | OUTPATIENT
Start: 2022-05-18

## 2022-05-18 RX ORDER — HYDRALAZINE HYDROCHLORIDE 20 MG/ML
5 INJECTION INTRAMUSCULAR; INTRAVENOUS EVERY 4 HOURS PRN
Status: DISCONTINUED | OUTPATIENT
Start: 2022-05-18 | End: 2022-05-21

## 2022-05-18 RX ORDER — HYDROCODONE BITARTRATE AND ACETAMINOPHEN 5; 325 MG/1; MG/1
1 TABLET ORAL EVERY 6 HOURS PRN
Status: CANCELLED | OUTPATIENT
Start: 2022-05-18

## 2022-05-18 RX ORDER — ASPIRIN 81 MG/1
81 TABLET, CHEWABLE ORAL DAILY
Qty: 30 TABLET | Refills: 3 | Status: SHIPPED | OUTPATIENT
Start: 2022-05-19

## 2022-05-18 RX ORDER — LISINOPRIL 20 MG/1
20 TABLET ORAL DAILY
Status: CANCELLED | OUTPATIENT
Start: 2022-05-18

## 2022-05-18 RX ORDER — ACETAMINOPHEN 325 MG/1
650 TABLET ORAL EVERY 4 HOURS PRN
Status: DISCONTINUED | OUTPATIENT
Start: 2022-05-18 | End: 2022-05-27 | Stop reason: HOSPADM

## 2022-05-18 RX ORDER — ATORVASTATIN CALCIUM 80 MG/1
80 TABLET, FILM COATED ORAL NIGHTLY
Status: DISCONTINUED | OUTPATIENT
Start: 2022-05-18 | End: 2022-05-27 | Stop reason: HOSPADM

## 2022-05-18 RX ORDER — FUROSEMIDE 20 MG/1
20 TABLET ORAL DAILY
Status: DISCONTINUED | OUTPATIENT
Start: 2022-05-18 | End: 2022-05-27 | Stop reason: HOSPADM

## 2022-05-18 RX ORDER — METOPROLOL TARTRATE 100 MG/1
100 TABLET ORAL 2 TIMES DAILY
Status: DISCONTINUED | OUTPATIENT
Start: 2022-05-18 | End: 2022-05-23

## 2022-05-18 RX ORDER — MISOPROSTOL 100 UG/1
100 TABLET ORAL 2 TIMES DAILY
Status: DISCONTINUED | OUTPATIENT
Start: 2022-05-18 | End: 2022-05-27 | Stop reason: HOSPADM

## 2022-05-18 RX ORDER — SENNA AND DOCUSATE SODIUM 50; 8.6 MG/1; MG/1
1 TABLET, FILM COATED ORAL 2 TIMES DAILY
Status: DISCONTINUED | OUTPATIENT
Start: 2022-05-18 | End: 2022-05-27 | Stop reason: HOSPADM

## 2022-05-18 RX ORDER — SODIUM CHLORIDE 0.9 % (FLUSH) 0.9 %
10 SYRINGE (ML) INJECTION PRN
Status: CANCELLED | OUTPATIENT
Start: 2022-05-18

## 2022-05-18 RX ORDER — HYDRALAZINE HYDROCHLORIDE 20 MG/ML
5 INJECTION INTRAMUSCULAR; INTRAVENOUS EVERY 4 HOURS PRN
Status: CANCELLED | OUTPATIENT
Start: 2022-05-18

## 2022-05-18 RX ORDER — SENNA AND DOCUSATE SODIUM 50; 8.6 MG/1; MG/1
1 TABLET, FILM COATED ORAL 2 TIMES DAILY
Status: CANCELLED | OUTPATIENT
Start: 2022-05-18

## 2022-05-18 RX ORDER — FUROSEMIDE 20 MG/1
20 TABLET ORAL DAILY
Status: CANCELLED | OUTPATIENT
Start: 2022-05-18

## 2022-05-18 RX ORDER — VITAMIN B COMPLEX
1000 TABLET ORAL DAILY
Status: CANCELLED | OUTPATIENT
Start: 2022-05-18

## 2022-05-18 RX ORDER — SODIUM CHLORIDE 0.9 % (FLUSH) 0.9 %
10 SYRINGE (ML) INJECTION EVERY 12 HOURS SCHEDULED
Status: CANCELLED | OUTPATIENT
Start: 2022-05-18

## 2022-05-18 RX ORDER — SODIUM CHLORIDE 0.9 % (FLUSH) 0.9 %
10 SYRINGE (ML) INJECTION EVERY 12 HOURS SCHEDULED
Status: DISCONTINUED | OUTPATIENT
Start: 2022-05-18 | End: 2022-05-19

## 2022-05-18 RX ORDER — HYDROCODONE BITARTRATE AND ACETAMINOPHEN 5; 325 MG/1; MG/1
1 TABLET ORAL EVERY 6 HOURS PRN
Status: DISCONTINUED | OUTPATIENT
Start: 2022-05-18 | End: 2022-05-27 | Stop reason: HOSPADM

## 2022-05-18 RX ORDER — SODIUM CHLORIDE 0.9 % (FLUSH) 0.9 %
10 SYRINGE (ML) INJECTION PRN
Status: DISCONTINUED | OUTPATIENT
Start: 2022-05-18 | End: 2022-05-27 | Stop reason: HOSPADM

## 2022-05-18 RX ORDER — ENOXAPARIN SODIUM 100 MG/ML
40 INJECTION SUBCUTANEOUS DAILY
Status: DISCONTINUED | OUTPATIENT
Start: 2022-05-18 | End: 2022-05-27 | Stop reason: HOSPADM

## 2022-05-18 RX ADMIN — MISOPROSTOL 100 MCG: 100 TABLET ORAL at 22:58

## 2022-05-18 RX ADMIN — MISOPROSTOL 100 MCG: 100 TABLET ORAL at 11:02

## 2022-05-18 RX ADMIN — LISINOPRIL 20 MG: 20 TABLET ORAL at 10:50

## 2022-05-18 RX ADMIN — SODIUM CHLORIDE, PRESERVATIVE FREE 10 ML: 5 INJECTION INTRAVENOUS at 10:53

## 2022-05-18 RX ADMIN — Medication 1000 UNITS: at 10:50

## 2022-05-18 RX ADMIN — LITHIUM CARBONATE 150 MG: 150 CAPSULE ORAL at 18:33

## 2022-05-18 RX ADMIN — ENOXAPARIN SODIUM 40 MG: 100 INJECTION SUBCUTANEOUS at 10:51

## 2022-05-18 RX ADMIN — FUROSEMIDE 20 MG: 20 TABLET ORAL at 10:50

## 2022-05-18 RX ADMIN — MEMANTINE HYDROCHLORIDE 5 MG: 5 TABLET ORAL at 10:51

## 2022-05-18 RX ADMIN — METOPROLOL TARTRATE 100 MG: 100 TABLET, FILM COATED ORAL at 23:01

## 2022-05-18 RX ADMIN — SENNOSIDES AND DOCUSATE SODIUM 1 TABLET: 50; 8.6 TABLET ORAL at 10:50

## 2022-05-18 RX ADMIN — ASPIRIN 81 MG 81 MG: 81 TABLET ORAL at 10:51

## 2022-05-18 RX ADMIN — MEMANTINE HYDROCHLORIDE 5 MG: 5 TABLET, FILM COATED ORAL at 23:27

## 2022-05-18 RX ADMIN — ATORVASTATIN CALCIUM 80 MG: 80 TABLET, FILM COATED ORAL at 22:58

## 2022-05-18 RX ADMIN — METOPROLOL 100 MG: 100 TABLET ORAL at 10:49

## 2022-05-18 NOTE — PROGRESS NOTES
Pt is A&Ox3 s/p CVA, with right sided weakness. Pt has slurred speech but can be understood by this nurse. Rt arm drifts and rt leg cannot be lifted off the bed. Neuro checks performed at this time. No c/o pain. Turned and repositioned for comfort. Monitoring. Call light within reach.

## 2022-05-18 NOTE — PROGRESS NOTES
The [facility]   After review, this patient is felt to be:       [x]  Appropriate for Acute Inpatient Rehab- pending medical and negative rapid covid test.     []  Appropriate for Acute Inpatient Rehab Pending Insurance Authorization    []  Not appropriate for Acute Inpatient Rehab    []  Referral received and ARU reviewing patient      Precert not required for ARU admission. Pt in agreement per  and CL's conversation with pt this AM.  Will notify CM/SW with further updates.  Thank you for the referral.    Larisa ADRIAN, OTR/L  Clinical Liaison- [facility]   (P): [phone]  (F): [phone]

## 2022-05-18 NOTE — CONSULTS
Consult Note  Physical Medicine and Rehabilitation    Patient: Madonna Bruce  [id number]  Date: 5/18/2022      Chief Complaint: CVA    History of Present Illness/Hospital Course: This is a 71yo woman with a PMH of HTN, HLD, bipolar disorder, schizophrenia, current smoker, with vascular risk factors including poorly controlled HTN and smoking presents with left thalamic stroke which is consistent with small vessel ischemic disease    Prior Level of Function:  Independent for mobility, ADLs, and IADLs    Current Level of Function:  Mod assist     Pertinent Social History:  Support: Lives with son  Home set-up: 3 steps to enter     History reviewed. No pertinent past medical history. History reviewed. No pertinent surgical history. History reviewed. No pertinent family history. Social History     Socioeconomic History    Marital status: Single     Spouse name: None    Number of children: None    Years of education: None    Highest education level: None   Occupational History    None   Tobacco Use    Smoking status: Current Every Day Smoker     Packs/day: 2.00     Types: Cigarettes    Smokeless tobacco: Never Used   Substance and Sexual Activity    Alcohol use: Never    Drug use: Never    Sexual activity: None   Other Topics Concern    None   Social History Narrative    None     Social Determinants of Health     Financial Resource Strain:     Difficulty of Paying Living Expenses: Not on file   Food Insecurity:     Worried About Running Out of Food in the Last Year: Not on file    Mamadou of Food in the Last Year: Not on file   Transportation Needs:     Lack of Transportation (Medical): Not on file    Lack of Transportation (Non-Medical):  Not on file   Physical Activity:     Days of Exercise per Week: Not on file    Minutes of Exercise per Session: Not on file   Stress:     Feeling of Stress : Not on file   Social Connections:     Frequency of Communication with Friends and Family: Not on file    Frequency of Social Gatherings with Friends and Family: Not on file    Attends [employer] Services: Not on file    Active Member of Clubs or Organizations: Not on file    Attends Club or Organization Meetings: Not on file    Marital Status: Not on file   Intimate Partner Violence:     Fear of Current or Ex-Partner: Not on file    Emotionally Abused: Not on file    Physically Abused: Not on file    Sexually Abused: Not on file   Housing Stability:     Unable to Pay for Housing in the Last Year: Not on file    Number of [place] in the Last Year: Not on file    Unstable Housing in the Last Year: Not on file           REVIEW OF SYSTEMS:   CONSTITUTIONAL: negative for fevers, chills, diaphoresis, appetite change, night sweats, unexpected weight change, fatigue  EYES: negative for blurred vision, eye discharge, visual disturbance and icterus  HEENT: negative for hearing loss, tinnitus, ear drainage, sinus pressure, nasal congestion, epistaxis and snoring  RESPIRATORY: Negative for hemoptysis, cough, sputum production  CARDIOVASCULAR: negative for chest pain, palpitations, exertional chest pressure/discomfort, syncope, edema  GASTROINTESTINAL: negative for nausea, vomiting, diarrhea, blood in stool, abdominal pain, constipation  GENITOURINARY: negative for frequency, dysuria, urinary incontinence, decreased urine volume, and hematuria  HEMATOLOGIC/LYMPHATIC: negative for easy bruising, bleeding and lymphadenopathy  ALLERGIC/IMMUNOLOGIC: negative for recurrent infections, angioedema, anaphylaxis and drug reactions  ENDOCRINE: negative for weight changes and diabetic symptoms including polyuria, polydipsia and polyphagia  MUSCULOSKELETAL: negative for pain, joint swelling, decreased range of motion  NEUROLOGICAL: positive for slurred speech, weakness- increased on right side   PSYCHIATRIC/BEHAVIORAL: negative for hallucinations, behavioral problems, confusion and agitation.      Physical Examination:  Vitals:   Patient Vitals for the past 24 hrs:   BP Temp Temp src Pulse Resp SpO2   05/18/22 1000 138/61 97.5 °F (36.4 °C) Oral 58 18 98 %   05/18/22 0825 -- -- -- -- 18 95 %   05/18/22 0745 128/71 97.3 °F (36.3 °C) Oral 58 16 95 %   05/18/22 0615 (!) 148/50 97.4 °F (36.3 °C) Oral 58 18 94 %   05/18/22 0253 (!) 145/55 97.9 °F (36.6 °C) Oral 53 18 93 %   05/17/22 2259 (!) 149/51 98 °F (36.7 °C) Oral 52 20 95 %   05/17/22 2020 -- -- -- -- 20 95 %   05/17/22 1800 (!) 184/68 98.3 °F (36.8 °C) Oral 54 16 97 %   05/17/22 1430 (!) 152/82 97.5 °F (36.4 °C) Oral 55 18 96 %     Const: Alert. WDWN. No distress  Eyes: Conjunctiva noninjected, no icterus noted; pupils equal, round, and reactive to light. HENT: Atraumatic, normocephalic; Oral mucosa moist  Neck: Trachea midline, neck supple. No thyromegaly noted. CV: Regular rate and rhythm, no murmur rub or gallop noted  Resp: Lungs clear to auscultation bilaterally, no rales wheezes or ronchi, no retractions. Respirations unlabored. GI: Soft, nontender, nondistended. Normal bowel sounds. No palpable masses. Skin: Normal temperature and turgor. No rashes or breakdown noted. Ext: No significant edema appreciated. No varicosities. MSK: No joint tenderness, erythema, warmth noted. AROM intact. Neuro: Alert, oriented, appropriate.                RUE: 3/5, slt pronator drift  RLE: 4/5  LUE: 5/5, no pronator drift              LLE: 5/5  mild dysarthria    Psych: Stable mood, normal judgement, normal affect     Lab Results   Component Value Date    WBC 9.7 05/17/2022    HGB 11.8 (L) 05/17/2022    HCT 35.0 (L) 05/17/2022    .0 (H) 05/17/2022     05/17/2022     Lab Results   Component Value Date    INR 0.98 05/16/2022    PROTIME 11.1 05/16/2022     Lab Results   Component Value Date    CREATININE 0.6 05/17/2022    BUN 11 05/17/2022     05/17/2022    K 3.7 05/17/2022     05/17/2022    CO2 28 05/17/2022     Lab Results   Component Value Date ALT 11 05/16/2022    AST 15 05/16/2022    ALKPHOS 74 05/16/2022    BILITOT 0.4 05/16/2022         FL MODIFIED BARIUM SWALLOW W VIDEO   Final Result   1. Satisfactory elevation and closure the epiglottis without penetration or aspiration   2. Residue fluid within the oral cavity is noted with thicker textures in addition to the multiple successive swallows portion of the exam      MRI BRAIN WO CONTRAST   Final Result      Small recent infarct in the left thalamocapsular junction. No intracranial hemorrhage. Mild atrophy and mild-to-moderate chronic small vessel ischemic change. Probable tiny remote lacunar infarcts as detailed. XR CHEST PORTABLE   Final Result   Impression: Bilateral perihilar opacities and prominence of the pulmonary vasculature. CTA HEAD NECK W CONTRAST   Final Result      1. No large vessel occlusion. 2. No flow-limiting steno-occlusive disease. 50% stenosis of the right ICA. 20% stenosis of the left ICA. 3. Ill-defined 6 mm right apical nodule, favored be inflammatory. Recommend follow-up chest CT in 3 months. Assessment:  1. 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 Dysphagia- ALP evaluation MBS failed in the past  3. Dysarthria- SLP evaluation   4. Right apical lung nodule- found of CT- 3 month follow up   5. Schizophrenia- continue home medications       Impairments- Decreased functional mobility, Decreased ADLs    Recommendations:  ARU admit    Patient with new functional deficits and ongoing medical complexity. Demonstrates ability to tolerate 3 hours therapy/day. She is a good candidate for acute inpatient rehab when medically appropriate. Thank you for this consult. Please contact me with any questions or concerns.      Negrita Baker D.O. M.P.H  PM&R  5/18/2022  11:56 AM

## 2022-05-18 NOTE — PROGRESS NOTES
Speech Language Pathology  Facility/Department: [facility] 5T ORTHO/NEURO  Dysphagia Daily Treatment Note    NAME: Neri Feliciano  : 1940  MRN: [id number]    Patient Diagnosis(es):   Patient Active Problem List    Diagnosis Date Noted    Primary hypertension 2022    Smoking 2022    Bipolar disorder ([facility] [address] 2022    Thalamic stroke ([facility] [address] 2022     Allergies: No Known Allergies     Recent Chest Xray 22  Impression: Bilateral perihilar opacities and prominence of the pulmonary vasculature     MRI brain 22  Impression       Small recent infarct in the left thalamocapsular junction. No intracranial hemorrhage. Previous MBS - n/a  Chart reviewed. Medical Diagnosis: Acute cerebrovascular accident (CVA) [facility]) [I63.9]  Cerebrovascular accident (CVA), unspecified mechanism ([facility] [address] [I63.9]   Treatment Diagnosis: dysphagia    BSE Impression 22  RN reported pt failed [place] swallow screen x2. Pt is alert and oriented x3 with mild dysarthria. Pt with right facial weakness with tongue deviating to the left upon protrusion. Pt had no difficulty closing lips around spoon or drawing liquid up a straw. Pt noted to take multiple bites of cracker without swallowing and required cue to inhibit taking another bite. Pt had mild difficulty with mastication with solids. There was mild right anterior spillage with solids and applesauce. Pt demonstrated no s/s aspiration with any consistency presented. Pt able to initiate swallow without difficulty with laryngeal movement observed. Vocal quality remained clear throughout. Pt noted to produce reactive cough when consume 3 successive swallows of water by straw. Given this observation and RN report of pt coughing with [place] screen x2 did not instruct pt to consume 3 oz of water continuously. Pt would benefit from further assessment via Modified Barium Swallow Study.     MBS results 22  Pt presents with mild-moderate oral phase dysphagia. Pt with prolonged mastication with solids, tongue pumping with pureed and solids with impaired A-P transport. After trial with thin by cup, pt noted to have residue remaining in the oral cavity in antrior sulci which pt did not appear to sensate. Pt was able to clear when instructed to swallow. Pt able to trigger a swallow in a timely manner with good laryngeal vestibule closure. Pt with no instances of aspiration or penetration with any consistency, despite failing [place] swallow screen 2x with RN and coughing when consuming 3 successive swallows of water at the bedside. Pt had no residue remaining in valleculae or pyriform after the swallow with any consistency. Pain: denies    Current Diet : ADULT DIET; Dysphagia - Soft and Bite Sized   Recommended Form of Meds: Meds in puree     Treatment:  Pt seen bedside to address the following goals:  1- The patient will tolerate instrumental swallowing procedure  5/17- goal met     New goal-  2- The patient will tolerate least restrictive diet without s/s aspiration or respiratory decline. 5/18:  Pt sitting in bed with lunch tray, assisted to upright position. Pt consumed minced turkey, mashed potatoes, chopped green beans, pudding and thin liquids. Potatoes and chicken somewhat dry, instructed pt to request extra gravy next time. Pt demonstrating slow but adequate mastication with current texture, intermittent lingual and buccal cavity residue noted. Pt instructed to use lingual sweep, pt followed through with instruction with min cues. No overt signs of aspiration emerged with liquid via cup or straw, voice remained clear. No respiratory decline per chart review. Goal met, cont to ensure consistency    3- The pt/caregiver will demonstrate understanding of swallowing recommendations and concerns.   5/17- The pt was educated to purpose of the visit, anatomy and physiology of the swallow, concerns for aspiration, recommendation for MBS and a description of the procedure. The pt stated comprehension and agreement with MBS con't goal  6/17- second session- pt educated to the results of the MBS (with video review) diet recommendations and swallowing strategies. Pt stated comprehension. con't goal   5/18: pt recalled MBS and able to state she should check for food in her mouth, but not specific strategies. Reviewed all information, pt demonstrated comprehension through use of strategies during meal, with min cues  Goal met, cont to ensure consistency     Patient/Family/Caregiver Education:  As above    Compensatory Strategies:  · Eat/Feed slowly  · Upright as possible for all oral intake  · Remain upright for 30-45 minutes after meals  · Small bites/sips  · Check for pocketing of food     Plan:  Continue dysphagia treatment with goals per plan of care. Diet recommendations: cont soft and bite sized/thin liquids  DC recommendation: ongoing treatment indicated  Treatment: 15  D/W nursing Clemy  Needs met prior to leaving room, call button in reach. Dawn Vaughan M.S./[facility]-SLP #[id number]  Pg.  # [phone]  If patient is discharged prior to next treatment, this note will serve as the discharge summary

## 2022-05-18 NOTE — PROGRESS NOTES
Occupational Therapy  Facility/Department: [facility] 5T ORTHO/NEURO  Daily Treatment Note  NAME: Hortencia Purdy  : 1940  MRN: [id number]    Date of Service: 2022    Assessment: pt tolerated treatement well despite reporting being tired. pt continues to be below baseline and require mod a for bed mobility and max a for LB dressing. pt activity tolerance continues to be decreased and limited by R sided weakness, coordination impairments and fatigue. Pt accepted at [place] [id number] and will transfer there pending COVID test results. Will follow POC thoughout this hospital admit. Discharge Recommendations: Hortencia Purdy scored a 15/24 on the AM-PAC ADL Inpatient form. Current research shows that an AM-PAC score of 17 or less is typically not associated with a discharge to the patient's home setting. Based on the patient's AM-PAC score and their current ADL deficits, it is recommended that the patient have 5-7 sessions per week of Occupational Therapy at d/c to increase the patient's independence. At this time, this patient demonstrates the endurance, and/or tolerance for 3 hours of therapy each day, with a treatment frequency of 5-7x/wk. Please see assessment section for further patient specific details. If patient discharges prior to next session this note will serve as a discharge summary. Please see below for the latest assessment towards goals. OT Equipment Recommendations  Equipment Needed: No      Patient Diagnosis(es): The encounter diagnosis was Cerebrovascular accident (CVA), unspecified mechanism ([address] Assessment      Activity Tolerance: Patient limited by fatigue  Equipment Needed: No      Plan   Plan  Times per Week: 5-7x  Times per Day: Daily  Current Treatment Recommendations: Strengthening;ROM;Balance training;Functional mobility training; Endurance training;Gait training;Cognitive reorientation; Neuromuscular re-education;Positioning; Safety education & training;Self-Care / ADL;Patient/Caregiver education & training     Restrictions  Restrictions/Precautions  Restrictions/Precautions: Up as Tolerated  Position Activity Restriction  Other position/activity restrictions: up as tolerated    Subjective   Subjective  Subjective: pt was in bed with family upon entry. pt was pleasant and cooperative for therapy. additional pertinent history: pt is a 80 y.o female admitted on 5/16 p/w right sided weakness and facial droop. Hospital course: Brain MRI:  infarct in the left thalamocapsular junction, No intracranial hemorrhage; MBS: Satisfactory elevation and closure the epiglottis without penetration or aspirationchest XR: (-) PTX/effusion; CTA head/neck: (-) occlusion/flow limiting stenosis; PMH: current smoker, COPD, schizophrenia    Pain: pt did not report pain. after tx, pt reported feeling sick. after laying down for several minutes symptoms subsided  Cognition  Overall Cognitive Status: Exceptions  Following Commands: Follows one step commands with increased time  Safety Judgement: Decreased awareness of need for assistance        Objective    Vitals     Bed Mobility Training  Bed Mobility Training: Yes  Supine to Sit: Moderate assistance (for BLE assist and trunk assit)  Sit to Supine: Moderate assistance (BLE assist)  Scooting: Minimum assistance (supine in bed)    Balance  Sitting:  (CGA sitting EOB. L lean but able to remain upright)  Standing:  (CGA static, min a dynamic. pt stood at EOB and edge of chair 2 min + 2 min)    Transfer Training  Transfer Training: Yes  Sit to Stand: Contact-guard assistance  Stand to Sit: Contact-guard assistance    Gait  Overall Level of Assistance: Minimum assistance;Contact-guard assistance (CGA with instances of min a for unsteadiness. Bed to chair and around room.  3 min + 5 min)  Assistive Device: Walker, rolling     ADL  LE Dressing: Maximum assistance (don/doff socks)     OT Exercises  Exercise Treatment: Bicep curls 10x, finger extension/flexion 10x  A/AROM Exercises: L bicep curls 10x  Dynamic Sitting Balance Exercises: reach for target 5x     Safety Devices  Type of Devices: Gait belt;Nurse notified;Call light within reach; Left in bed;Bed alarm in place     Patient Education  Education Given To: Patient  Education Provided: Role of Therapy;Plan of Care;Transfer Training;Energy Conservation; ADL Adaptive Strategies  Education Method: Demonstration;Verbal  Barriers to Learning: Cognition  Education Outcome: Verbalized understanding;Continued education needed    Goals  Short Term Goals  Time Frame for Short term goals: by d/c  Short Term Goal 1: complete transfer [facility] bed [facility] w/ SBA- not addressed  Short Term Goal 2: increase activity tolerance in standing to 8 min - not met  Short Term Goal 3: complete LB dressing with min a - not met  Patient Goals   Patient goals : to get better       Therapy Time   Individual Concurrent Group Co-treatment   Time In 1350         Time Out 1419         Minutes 29           Timed Code Tx Min: 14  Total Tx Min: 34    Therapist was present, directed the patient's care, made skilled judgment, and was responsible for assessment and treatment of the patient. If patient is discharged prior to next treatment, this not will serve as the discharge summary.          Patience Silverior s/ot

## 2022-05-18 NOTE — PROGRESS NOTES
Hospitalist Progress Note      PCP: No primary care provider on file. Date of Admission: 5/16/2022    Chief Complaint:     Chief Complaint   Patient presents with    Facial Droop     Pt reports right sided weakness with dinner last night and worsening symptoms since. Pt has right sided facial droop and right sided hand weakness (mild) and right arm drift that does not hit bed. Pt brought by [facility] where she had a CT in route    Aphasia    Extremity Weakness       Subjective:  Patient seen and examined at the bedside. No complaints at this time.   No acute events overnight  Echo with bubble negative for shunt  MBS performed, diet adjusted as indicated  PT/OT, ARU consulted for possible rehab    PFHS: reviewed as documented 5/16/2022, no changes unless noted above    Medications:  Reviewed    Infusion Medications    sodium chloride       Scheduled Medications    aspirin  81 mg Oral Daily    divalproex  125 mg Oral QPM    fluticasone  1 spray Each Nostril Daily    furosemide  20 mg Oral Daily    lisinopril  20 mg Oral Daily    lithium  150 mg Oral Every Other Day    memantine  5 mg Oral BID    metoprolol  100 mg Oral BID    miSOPROStol  100 mcg Oral BID    Vitamin D  1,000 Units Oral Daily    atorvastatin  80 mg Oral Nightly    sodium chloride flush  10 mL IntraVENous 2 times per day    enoxaparin  40 mg SubCUTAneous Daily    sennosides-docusate sodium  1 tablet Oral BID     PRN Meds: HYDROcodone-acetaminophen, perflutren lipid microspheres, hydrALAZINE, sodium chloride flush, sodium chloride, promethazine **OR** ondansetron, polyethylene glycol, acetaminophen **OR** acetaminophen      Intake/Output Summary (Last 24 hours) at 5/18/2022 1010  Last data filed at 5/18/2022 0824  Gross per 24 hour   Intake 720 ml   Output 1050 ml   Net -330 ml       Physical Exam    /61   Pulse 58   Temp 97.5 °F (36.4 °C) (Oral)   Resp 18   Ht 5' 4\" (1.626 m)   Wt 166 lb 3.2 oz (75.4 kg) SpO2 98%   BMI 28.53 kg/m²     General appearance:  No acute distress, appears stated age  Eyes: Pupils equal, round, reactive to light, conjunctiva/corneas clear  Ears/Nose/Mouth/Throat: No external lesions or scars, hearing intact to voice  Neck: Trachea midline, no masses noted, no thyromegaly  Respiratory:  Non-labored breathing, clear to auscultation bilaterally  Cardiovascular: Regular rate and rhythm, no murmurs, gallops, or rubs  Abdomen: soft, non-tender, non-distended  Musculoskeletal: Warm, well perfused, no cyanosis or edema  Skin: No rashes  Psychiatric: A&Ox4, good insight and judgment    Labs:   Recent Labs     05/16/22  1513 05/17/22  0730   WBC 12.4* 9.7   HGB 11.2* 11.8*   HCT 33.5* 35.0*    250     Recent Labs     05/16/22  1513 05/17/22  0730    143   K 4.0 3.7    105   CO2 25 28   BUN 8 11   CREATININE 0.7 0.6   CALCIUM 8.9 9.4     Recent Labs     05/16/22  1513   AST 15   ALT 11   BILITOT 0.4   ALKPHOS 74     Recent Labs     05/16/22  1513   INR 0.98     Recent Labs     05/16/22  1513   TROPONINI <0.01       Urinalysis:    No results found for: Kuldeep Breathitt, BACTERIA, RBCUA, BLOODU, SPECGRAV, GLUCOSEU    Radiology:  FL MODIFIED BARIUM SWALLOW W VIDEO   Final Result   1. Satisfactory elevation and closure the epiglottis without penetration or aspiration   2. Residue fluid within the oral cavity is noted with thicker textures in addition to the multiple successive swallows portion of the exam      MRI BRAIN WO CONTRAST   Final Result      Small recent infarct in the left thalamocapsular junction. No intracranial hemorrhage. Mild atrophy and mild-to-moderate chronic small vessel ischemic change. Probable tiny remote lacunar infarcts as detailed. XR CHEST PORTABLE   Final Result   Impression: Bilateral perihilar opacities and prominence of the pulmonary vasculature. CTA HEAD NECK W CONTRAST   Final Result      1. No large vessel occlusion.    2. No flow-limiting steno-occlusive disease. 50% stenosis of the right ICA. 20% stenosis of the left ICA. 3. Ill-defined 6 mm right apical nodule, favored be inflammatory. Recommend follow-up chest CT in 3 months. Assessment/Plan:    Active Hospital Problems    Diagnosis Date Noted    Primary hypertension [I10] 05/17/2022     Priority: Medium    Smoking [F17.200] 05/17/2022     Priority: Medium    Bipolar disorder ([facility] [address] [F31.9] 05/17/2022     Priority: Medium    Thalamic stroke ([facility] [address] [I63.9] 05/16/2022     Priority: Medium       Plan:    #Acute thalamic CVA  CT, CTA with findings above  MRI showed small left thalamic capsule infarction  Echo negative for shunt  PT/OT, plan for ARU  MBS performed, diet adjusted per recommendation    #Right apical lung nodule  Incidentally noted on CT, recommend 3-month follow-up imaging  Discussed with patient    #Schizophrenia  Well-controlled, continue management    # Remainder of medical conditions  -home management except as above    DVT Prophylaxis: Lovenox  Diet: ADULT DIET;  Dysphagia - Soft and Bite Sized  Code Status: Full Code    PT/OT Eval Status: Ongoing    Dispo: Ria Harry pending clinical improvement    Dillon Cool MD

## 2022-05-18 NOTE — PLAN OF CARE
Problem: Discharge Planning  Goal: Discharge to home or other facility with appropriate resources  5/18/2022 0324 by Chitra Mcmillan RN  Outcome: Progressing  5/17/2022 1639 by Josephine Fletcher RN  Outcome: Progressing  Flowsheets  Taken 5/17/2022 1035  Discharge to home or other facility with appropriate resources:   Identify barriers to discharge with patient and caregiver   Arrange for needed discharge resources and transportation as appropriate   Identify discharge learning needs (meds, wound care, etc)   Refer to discharge planning if patient needs post-hospital services based on physician order or complex needs related to functional status, cognitive ability or social support system  Taken 5/17/2022 0747  Discharge to home or other facility with appropriate resources:   Identify barriers to discharge with patient and caregiver   Arrange for needed discharge resources and transportation as appropriate   Identify discharge learning needs (meds, wound care, etc)   Arrange for interpreters to assist at discharge as needed   Refer to discharge planning if patient needs post-hospital services based on physician order or complex needs related to functional status, cognitive ability or social support system     Problem: Pain  Goal: Verbalizes/displays adequate comfort level or baseline comfort level  5/18/2022 0324 by Chitra Mcmillan RN  Outcome: Progressing  5/17/2022 1639 by Josephine Fletcher RN  Outcome: Progressing  Flowsheets  Taken 5/17/2022 1430  Verbalizes/displays adequate comfort level or baseline comfort level:   Encourage patient to monitor pain and request assistance   Assess pain using appropriate pain scale   Administer analgesics based on type and severity of pain and evaluate response   Implement non-pharmacological measures as appropriate and evaluate response   Consider cultural and social influences on pain and pain management   Notify Licensed Independent Practitioner if interventions unsuccessful or patient reports new pain  Taken 5/17/2022 1000  Verbalizes/displays adequate comfort level or baseline comfort level:   Encourage patient to monitor pain and request assistance   Assess pain using appropriate pain scale   Administer analgesics based on type and severity of pain and evaluate response   Implement non-pharmacological measures as appropriate and evaluate response   Consider cultural and social influences on pain and pain management  Taken 5/17/2022 0715  Verbalizes/displays adequate comfort level or baseline comfort level:   Encourage patient to monitor pain and request assistance   Assess pain using appropriate pain scale   Administer analgesics based on type and severity of pain and evaluate response   Implement non-pharmacological measures as appropriate and evaluate response   Consider cultural and social influences on pain and pain management   Notify Licensed Independent Practitioner if interventions unsuccessful or patient reports new pain     Problem: ABCDS Injury Assessment  Goal: Absence of physical injury  5/18/2022 0324 by Sharyle Porter, RN  Outcome: Progressing  Flowsheets  Taken 5/18/2022 0234 by Sharyle Porter, RN  Absence of Physical Injury: Implement safety measures based on patient assessment  Taken 5/17/2022 1640 by Cal Walls RN  Absence of Physical Injury: Implement safety measures based on patient assessment  5/17/2022 1639 by Cal Walls RN  Outcome: Progressing     Problem: Skin/Tissue Integrity  Goal: Absence of new skin breakdown  Description: 1. Monitor for areas of redness and/or skin breakdown  2. Assess vascular access sites hourly  3. Every 4-6 hours minimum:  Change oxygen saturation probe site  4. Every 4-6 hours:  If on nasal continuous positive airway pressure, respiratory therapy assess nares and determine need for appliance change or resting period.   Outcome: Progressing     Problem: Safety - Adult  Goal: Free from fall injury  Outcome: Progressing  Flowsheets (Taken 5/18/2022 0234)  Free From Fall Injury:   Instruct family/caregiver on patient safety   Based on caregiver fall risk screen, instruct family/caregiver to ask for assistance with transferring infant if caregiver noted to have fall risk factors

## 2022-05-18 NOTE — PROGRESS NOTES
Arrived to unit, vital signs obtained, patient instructed on fall precautions, call light and bedside table are within reach.

## 2022-05-18 NOTE — DISCHARGE INSTR - COC
Continuity of Care Form    Patient Name: Annemarie Jones   :  1940  MRN:  [id number]    Admit date:  2022  Discharge date:  ***    Code Status Order: Full Code   Advance Directives:      Admitting Physician:  Susan Guzman MD  PCP: No primary care provider on file. Discharging Nurse: [facility] Unit/Room#: 1374/5321-52  Discharging Unit Phone Number: ***    Emergency Contact:   Extended Emergency Contact Information  Primary Emergency Contact: [address], [address] [facility] Phone: [phone]  Relation: Child  Preferred language: English   needed? No  Secondary Emergency Contact: Mattie Yoo [address] Phone: [phone]  Mobile Phone: [phone]  Relation: Grandchild    Past Surgical History:  History reviewed. No pertinent surgical history. Immunization History: There is no immunization history on file for this patient. Active Problems:  Patient Active Problem List   Diagnosis Code    Thalamic stroke ([facility] [address] I63.9    Primary hypertension I10    Smoking F17.200    Bipolar disorder ([facility] [address] F31.9       Isolation/Infection:   Isolation            No Isolation          Patient Infection Status       None to display            Nurse Assessment:  Last Vital Signs: /61   Pulse 58   Temp 97.5 °F (36.4 °C) (Oral)   Resp 18   Ht 5' 4\" (1.626 m)   Wt 166 lb 3.2 oz (75.4 kg)   SpO2 98%   BMI 28.53 kg/m²     Last documented pain score (0-10 scale): Pain Level: 5  Last Weight:   Wt Readings from Last 1 Encounters:   22 166 lb 3.2 oz (75.4 kg)     Mental Status:  oriented and alert    IV Access:  - None    Nursing Mobility/ADLs:  Walking   Assisted  Transfer  Assisted  Bathing  Assisted  Dressing  Assisted  Toileting  Assisted  Feeding  Independent  Med Admin  Assisted  Med Delivery   whole    Wound Care Documentation and Therapy:        Elimination:  Continence:    Bowel: Yes  Bladder: No  Urinary Catheter: None   Colostomy/Ileostomy/Ileal Conduit: No       Date of Last BM:     Intake/Output Summary (Last 24 hours) at 5/18/2022 1330  Last data filed at 5/18/2022 0824  Gross per 24 hour   Intake 720 ml   Output 750 ml   Net -30 ml     I/O last 3 completed shifts: In: 56 [P.O.:480; I.V.:10]  Out: 1550 [Urine:1550]    Safety Concerns: At Risk for Falls    Impairments/Disabilities:      None    Nutrition Therapy:  Current Nutrition Therapy:   - Oral Diet:  Dysphagia 3 advanced    Routes of Feeding: Oral  Liquids:  Thin Liquids  Daily Fluid Restriction: no  Last Modified Barium Swallow with Video (Video Swallowing Test): Done on 05/17    Treatments at the Time of Hospital Discharge:   Respiratory Treatments:   Oxygen Therapy:  {Therapy; copd oxygen:55374}  Ventilator:    - No ventilator support    Rehab Therapies: Physical Therapy, Occupational Therapy, and Speech/Language Therapy  Weight Bearing Status/Restrictions: No weight bearing restrictions  Other Medical Equipment (for information only, NOT a DME order):  walker  Other Treatments:     Patient's personal belongings (please select all that are sent with patient):       RN SIGNATURE:  Electronically signed by Alyssa Bynum RN on 5/18/22 at 6:29 PM EDT    CASE MANAGEMENT/SOCIAL WORK SECTION    Inpatient Status Date: 5/16/22    Readmission Risk Assessment Score:  Readmission Risk              Risk of Unplanned Readmission:  14           Discharging to Facility/ [facility]    Dialysis Facility (if applicable) N/A  Name:  Address:  Dialysis Schedule:  Phone:  Fax:    / signature: Electronically signed by DAVID Morataya on 5/18/22 at 1:31 PM EDT    PHYSICIAN SECTION    Prognosis: {Prognosis:3052313720}    Condition at Discharge: [address] Patient Condition:500797778}    Rehab Potential (if transferring to Rehab): {Prognosis:9535198301}    Recommended Labs or Other Treatments After Discharge: ***    Physician Certification: I certify the above information and transfer of Kathya Jose  is necessary for the continuing treatment of the diagnosis listed and that she requires {Admit to Appropriate Level of Care:90517} for {GREATER/LESS:151025241} 30 days.      Update Admission H&P: {CHP DME Changes in [facility]:138726757}    PHYSICIAN SIGNATURE:  {Esignature:141848895}

## 2022-05-18 NOTE — PROGRESS NOTES
Discharge order received. Patient informed of discharge order. Patient  verbalized understanding, denies needs or questions at this time. Discharge instructions sent with patient to ARU. All patient belongings packed and sent with patient upon discharge, including lithium Rx. . Patient left in wheelchair to ARU with RN. Called son Ata Head at [phone] to give him her new room number and a little update.

## 2022-05-18 NOTE — PROGRESS NOTES
Clinical Pharmacy Progress Note    80 y.o. female admitted with CVA. Pharmacy has been asked by Dr. Jessa Caban to adjust all drips to normal saline as appropriate based on compatibility to avoid fluid shifts since D5 is osmotically active. The following intermittent IV drips/infusions have been adjusted to saline:  None at this time     Total IV fluid delivered to patient over last 24h: n/a - just IV flushes    [facility] will follow daily to ensure all new IVPBs + drips are in NS. Please call with questions.   Alonzo Homans, PharmD, [facility]  Wireless: [id number]   5/18/2022 8:16 AM

## 2022-05-18 NOTE — PROGRESS NOTES
Speech Language Pathology  Facility/Department: [facility] 5T ORTHO/NEURO  Speech/language/cog Daily Treatment Note    NAME: Marycruz Bullock  : 1940  MRN: [id number]    Patient Diagnosis(es):   Patient Active Problem List    Diagnosis Date Noted    Primary hypertension 2022    Smoking 2022    Bipolar disorder ([facility] [address] 2022    Thalamic stroke ([facility] [address] 2022     Allergies: No Known Allergies    MRI brain 22  Impression       Small recent infarct in the left thalamocapsular junction. No intracranial hemorrhage.       Mild atrophy and mild-to-moderate chronic small vessel ischemic change. Probable tiny remote lacunar infarcts as detailed.        Chart reviewed. Pain: denies    Initial Assessment 22   Pt presents with mild dysarthria with mild cognitive impairment. Pt presents with right facial droop, tongue deviation to the left upon protrusion and impaired articulatory precision at the conversation level. Pt was able to follow commands and respond to questions appropriately. No instances of anomia noted. Pt was oriented x3 but displayed inflexibility of thought when asked to provide multiple solutions to routine problem had difficulty with basic math calculations relating to money and time. Pt was able to recall 3/3 words with a 5 minute delay. No difficulty noted with visual scanning for reading task.        Medical diagnosis: Acute cerebrovascular accident (CVA) ([facility] [address] [I63.9]  Cerebrovascular accident (CVA), unspecified mechanism ([facility] [address] [I63.9]  Treatment diagnosis: dysarthria/cognitive impairment    Treatment:  Pt seen to address the following goals:  1- state 2 strategies to improve intelligibility  : pt states she doesn't remember anyone telling her any strategies. Pt educated to strategies for improved intelligibility.   Pt able to restate accurately   Cont goal    2- utilize strategies to improve intelligibility at the conversation level with mod cues  : Intelligibility of speech improving per RN. pt used strategies in conversation with mod cues and self corrected x 1. Cont goal    3-perform graded problem solving tasks with 80% accuracy  5/18:  Not targeted  Cont   4- perform basic functional math tasks relating to money and time with 70% accuracy   5/18: not targeted  Cont     Education:  Pt educated to purpose of tasks presented and importance of following through with strategies when staff and visitors enter room. Pt stated comprehension but will benefit from reinforcement    Plan:  Continue speech/language therapy to address above goals, 3-5 x/week x LOS  DC recommendations: ongoing treatment indicated  D/W nursing Clemy  Needs met prior to leaving room, call button in reach. Treatment time: [address] [facility] NAHID [employer][id number]  Pg.  # [phone]  If patient is discharged prior to next treatment, this note will serve as the discharge summary

## 2022-05-18 NOTE — CARE COORDINATION
PIOTR following for discharge disposition. PIOTR updated that pt has been approved for [facility] and can go today if medically stable, just needs a covid (ordered). PIOTR spoke with [place] from [place] [facility] [address], she reported that pt and family are in agreement, had discussion with them this morning. PIOTR updated staff. Meghann Bethea, DAVID     1300: PIOTR updated pt on acceptance at [facility] [address] pt voiced agreement. PIOTR called son Jasmyne Laird and left message with update. PIOTR updated [facility], once covid comes back pt can go. [update] PIOTR spoke with son, he voiced agreement with placement at [facility] [address].    Meghann Bethea, MSW

## 2022-05-18 NOTE — DISCHARGE SUMMARY
Hospital Medicine Discharge Summary    Patient ID: Jody Thompson      Patient's PCP: No primary care provider on file. Admit Date: 5/16/2022     Discharge Date: 5/18/2022    Admitting Physician: Shayy Bello MD     Discharge Physician: Shayy Bello MD     Discharge Diagnoses: Active Hospital Problems    Diagnosis Date Noted    Primary hypertension [I10] 05/17/2022     Priority: Medium    Smoking [F17.200] 05/17/2022     Priority: Medium    Bipolar disorder ([facility] [address] [F31.9] 05/17/2022     Priority: Medium    Thalamic stroke ([facility] [address] [I63.9] 05/16/2022     Priority: Medium     See plan in progress note from this date for full hospital problem list.    The patient was seen and examined on day of discharge and this discharge summary is in conjunction with any daily progress note from day of discharge. Hospital Course:    HPI per admitting physician:  \"81 y.o. female who presented to [facility] with right-sided weakness that began last evening about 5:30 PM.  She was at her baseline laying in bed when she noticed right upper and right lower extremity feeling more heavy. She denies any pain, numbness, tingling in those extremities. A family member noticed that she had mildly slurred speech but the patient herself has not noticed this. She denies any confusion or trouble with word finding. She denies trouble swallowing though on swallow eval in the ED she was noted to have difficulty. NIH was 10. She is otherwise feeling well. Denies any chest pain, palpitations, lightheadedness or dizziness, no abdominal pain, nausea vomiting or diarrhea. No fevers or known sick contacts. She reports that her symptoms have improved gradually since they began and at this time they are not quite to baseline but markedly improved from last night.     Admitted for further management. \"    #Acute thalamic CVA  CT, CTA with findings above  MRI showed small left thalamic capsule infarction  Echo negative for shunt  PT/OT, plan for ARU  MBS performed, diet adjusted per recommendation     #Right apical lung nodule  Incidentally noted on CT, recommend 3-month follow-up imaging  Discussed with patient     #Schizophrenia  Well-controlled, continue management     # Remainder of medical conditions  -home management except as above    Please see admission H&P as well as progress note from this date. Physical Exam Performed:     /61   Pulse 58   Temp 97.5 °F (36.4 °C) (Oral)   Resp 18   Ht 5' 4\" (1.626 m)   Wt 166 lb 3.2 oz (75.4 kg)   SpO2 98%   BMI 28.53 kg/m²     Please see progress note from this date    Labs: For convenience and continuity at follow-up the following most recent labs are provided:      CBC:    Lab Results   Component Value Date    WBC 9.7 05/17/2022    HGB 11.8 05/17/2022    HCT 35.0 05/17/2022     05/17/2022       Renal:    Lab Results   Component Value Date     05/17/2022    K 3.7 05/17/2022     05/17/2022    CO2 28 05/17/2022    BUN 11 05/17/2022    CREATININE 0.6 05/17/2022    CALCIUM 9.4 05/17/2022         Significant Diagnostic Studies    Radiology:   FL MODIFIED BARIUM SWALLOW W VIDEO   Final Result   1. Satisfactory elevation and closure the epiglottis without penetration or aspiration   2. Residue fluid within the oral cavity is noted with thicker textures in addition to the multiple successive swallows portion of the exam      MRI BRAIN WO CONTRAST   Final Result      Small recent infarct in the left thalamocapsular junction. No intracranial hemorrhage. Mild atrophy and mild-to-moderate chronic small vessel ischemic change. Probable tiny remote lacunar infarcts as detailed. XR CHEST PORTABLE   Final Result   Impression: Bilateral perihilar opacities and prominence of the pulmonary vasculature. CTA HEAD NECK W CONTRAST   Final Result      1. No large vessel occlusion. 2. No flow-limiting steno-occlusive disease. 50% stenosis of the right ICA. 20% stenosis of the left ICA. 3. Ill-defined 6 mm right apical nodule, favored be inflammatory. Recommend follow-up chest CT in 3 months. Consults:     IP CONSULT TO PHARMACY  PHARMACY TO CHANGE BASE FLUIDS  IP CONSULT TO HOSPITALIST  IP CONSULT TO NEUROLOGY  IP CONSULT TO CASE MANAGEMENT  IP CONSULT TO PHARMACY  IP CONSULT TO PHYSICAL MEDICINE REHAB  IP CONSULT TO CASE MANAGEMENT  IP CONSULT TO DIETITIAN    Disposition:  ARU    Condition at Discharge: Stable    Discharge Instructions/Follow-up:  Follow up with PCP in 1 week for hospital follow-up and to review any pending labs/tests. Please follow other discharge instructions as outlined in the discharge instructions    Code Status:  Full Code    Activity: activity as tolerated    Diet: ADULT DIET; Dysphagia - Soft and Bite Sized    Discharge Medications:     Current Discharge Medication List           Details   aspirin 81 MG chewable tablet Take 1 tablet by mouth daily  Qty: 30 tablet, Refills: 3              Details   OLANZapine (ZYPREXA) 15 MG tablet Take 15 mg by mouth nightly      docusate sodium (COLACE) 100 MG capsule Take 100 mg by mouth every evening      estradiol (ESTRACE) 0.1 MG/GM vaginal cream Place 0.5 g vaginally every other day      lithium 150 MG capsule Take 150 mg by mouth every other day Takes at night      vitamin D 25 MCG (1000 UT) CAPS Take 1 capsule by mouth daily      atorvastatin (LIPITOR) 40 MG tablet Take 40 mg by mouth at bedtime      clotrimazole-betamethasone (LOTRISONE) 1-0.05 % cream Apply topically 2 times daily Apply topically 2 times daily.       furosemide (LASIX) 20 MG tablet Take 20 mg by mouth daily      lisinopril (PRINIVIL;ZESTRIL) 20 MG tablet Take 20 mg by mouth daily      memantine (NAMENDA) 5 MG tablet Take 5 mg by mouth 2 times daily       metoprolol (LOPRESSOR) 100 MG tablet Take 100 mg by mouth 2 times daily      miSOPROStol (CYTOTEC) 100 MCG tablet Take 100 mcg by mouth in the morning and at bedtime omeprazole (PRILOSEC) 20 MG delayed release capsule Take 40 mg by mouth daily      divalproex (DEPAKOTE) 125 MG DR tablet Take 125 mg by mouth every evening      HYDROcodone-acetaminophen (NORCO) 5-325 MG per tablet Take 1 tablet by mouth every 8 hours as needed. b complex vitamins capsule Take 1 capsule by mouth daily      fluticasone (FLONASE) 50 MCG/ACT nasal spray 1 spray by Each Nostril route daily             Time Spent on discharge is 35 minutes in the examination, evaluation, counseling and review of medications and discharge plan. Signed:    Elgin Clayton MD   5/18/2022      Thank you No primary care provider on file. for the opportunity to be involved in this patient's care. If you have any questions or concerns please feel free to contact me at [phone].

## 2022-05-19 PROCEDURE — 97166 OT EVAL MOD COMPLEX 45 MIN: CPT

## 2022-05-19 PROCEDURE — 97162 PT EVAL MOD COMPLEX 30 MIN: CPT

## 2022-05-19 PROCEDURE — 2580000003 HC RX 258: Performed by: PHYSICAL MEDICINE & REHABILITATION

## 2022-05-19 PROCEDURE — 97530 THERAPEUTIC ACTIVITIES: CPT

## 2022-05-19 PROCEDURE — 94761 N-INVAS EAR/PLS OXIMETRY MLT: CPT

## 2022-05-19 PROCEDURE — 94669 MECHANICAL CHEST WALL OSCILL: CPT

## 2022-05-19 PROCEDURE — 97535 SELF CARE MNGMENT TRAINING: CPT

## 2022-05-19 PROCEDURE — 99222 1ST HOSP IP/OBS MODERATE 55: CPT | Performed by: PHYSICAL MEDICINE & REHABILITATION

## 2022-05-19 PROCEDURE — 6360000002 HC RX W HCPCS: Performed by: PHYSICAL MEDICINE & REHABILITATION

## 2022-05-19 PROCEDURE — 6370000000 HC RX 637 (ALT 250 FOR IP): Performed by: PHYSICAL MEDICINE & REHABILITATION

## 2022-05-19 PROCEDURE — 1280000000 HC REHAB R&B

## 2022-05-19 PROCEDURE — 92523 SPEECH SOUND LANG COMPREHEN: CPT

## 2022-05-19 PROCEDURE — 97116 GAIT TRAINING THERAPY: CPT

## 2022-05-19 PROCEDURE — 92610 EVALUATE SWALLOWING FUNCTION: CPT

## 2022-05-19 RX ADMIN — SENNOSIDES AND DOCUSATE SODIUM 1 TABLET: 50; 8.6 TABLET ORAL at 20:26

## 2022-05-19 RX ADMIN — Medication 1000 UNITS: at 09:43

## 2022-05-19 RX ADMIN — SODIUM CHLORIDE, PRESERVATIVE FREE 10 ML: 5 INJECTION INTRAVENOUS at 09:47

## 2022-05-19 RX ADMIN — LISINOPRIL 20 MG: 20 TABLET ORAL at 09:43

## 2022-05-19 RX ADMIN — BISACODYL 5 MG: 5 TABLET, COATED ORAL at 09:43

## 2022-05-19 RX ADMIN — MISOPROSTOL 100 MCG: 100 TABLET ORAL at 20:26

## 2022-05-19 RX ADMIN — SENNOSIDES AND DOCUSATE SODIUM 1 TABLET: 50; 8.6 TABLET ORAL at 09:43

## 2022-05-19 RX ADMIN — METOPROLOL TARTRATE 100 MG: 100 TABLET, FILM COATED ORAL at 09:43

## 2022-05-19 RX ADMIN — PROMETHAZINE HYDROCHLORIDE 12.5 MG: 25 TABLET ORAL at 14:41

## 2022-05-19 RX ADMIN — ENOXAPARIN SODIUM 40 MG: 100 INJECTION SUBCUTANEOUS at 09:45

## 2022-05-19 RX ADMIN — ATORVASTATIN CALCIUM 80 MG: 80 TABLET, FILM COATED ORAL at 20:26

## 2022-05-19 RX ADMIN — ASPIRIN 81 MG 81 MG: 81 TABLET ORAL at 09:43

## 2022-05-19 RX ADMIN — MEMANTINE HYDROCHLORIDE 5 MG: 5 TABLET, FILM COATED ORAL at 09:46

## 2022-05-19 RX ADMIN — FUROSEMIDE 20 MG: 20 TABLET ORAL at 09:43

## 2022-05-19 RX ADMIN — MEMANTINE HYDROCHLORIDE 5 MG: 5 TABLET, FILM COATED ORAL at 20:25

## 2022-05-19 RX ADMIN — MISOPROSTOL 100 MCG: 100 TABLET ORAL at 09:46

## 2022-05-19 RX ADMIN — METOPROLOL TARTRATE 100 MG: 100 TABLET, FILM COATED ORAL at 20:26

## 2022-05-19 ASSESSMENT — PAIN SCALES - GENERAL
PAINLEVEL_OUTOF10: 0
PAINLEVEL_OUTOF10: 0

## 2022-05-19 NOTE — PROGRESS NOTES
Speech Language Pathology  Facility/Department: [facility] ACUTE REHAB UNIT  Initial Speech/Language/Cognitive Assessment    NAME: Maggie Lopez  : 1940   MRN: [id number]  ADMISSION DATE: 2022  ADMITTING DIAGNOSIS: has Thalamic stroke ([facility] [address] Primary hypertension; Smoking; Bipolar disorder ([facility] [address] and Acute cerebrovascular accident (CVA) ([facility] [address] on their problem list.  DATE ONSET: 22    Date of Eval: 2022   Evaluating Therapist: MARGOT Acosta    RECENT RESULTS  OF MRI: 22  Impression       Small recent infarct in the left thalamocapsular junction. No intracranial hemorrhage.       Mild atrophy and mild-to-moderate chronic small vessel ischemic change. Probable tiny remote lacunar infarcts as detailed. Primary Complaint: None stated. Pain:  Pain Assessment  Pain Assessment: None - Denies Pain  Pain Level: 0    Assessment:  Cognitive Diagnosis: Moderate cognitive-linguistic impairments  Speech Diagnosis: Mild dysarthria   Diagnosis: Pt presents with moderate cognitive impairments in the areas of memory, attention, problem solving/safety awareness, and executive function skills. Pt with mild dysarthria characterized by decreased volume/loudness, reduced rate, and imprecise articulations in longer utterences. Pt's son did state that her long term memory, as well as short term, appeared to be mostly intact prior to hospital admission. Pt's grandson assists with managing finances and medications at home. Recommendations:  Requires SLP Intervention: Yes  Duration of Treatment: 1-2 weeks or LOS  D/C Recommendations: Ongoing speech therapy is recommended during this hospitalization     Goals:  Short-term Goals  Goal 1: Pt will complete problem solving tasks given min cues with 80% accuracy. Goal 2: Pt will utilize compensatory strategies for recall given min cues with 80% accuracy.   Goal 3: Pt will sustain attention for task completion given no more than x3 redirections/repetitions of instructions. Goal 4: Pt will utilize speech strategies in basic conversational tasks for increased intelligibility of 90%. Patient/family involved in developing goals and treatment plan: Discussed with pt and son present    Subjective:   Previous level of function and limitations:   General  Chart Reviewed: Yes  Patient assessed for rehabilitation services?: Yes  Family / Caregiver Present: No  Subjective  Subjective: Pt seated upright in chair agreeable to SLP evaluation at this time. 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  is currently in [address]. Asha Cerna assists with homemaking responsibilities, managing finances and medications.   Vision  Vision: Impaired  Vision Exceptions: Wears glasses for reading  Hearing  Hearing: Exceptions to [facility]  Hearing Exceptions: Hard of hearing/hearing concerns        Objective:     Oral/Motor  Oral Hygiene: Moist;Clean;Other    Auditory Comprehension  Comprehension: Within Functional Limits     Expression  Primary Mode of Expression: Verbal    Verbal Expression  Verbal Expression: Within functional limits    Written Expression  Dominant Hand: Right     Pragmatics/Social Functioning  Pragmatics: Within functional limits    Cognition:      Orientation  Overall Orientation Status: Within Normal Limits  Attention  Attention: Exceptions to [facility]  Selective Attention: Mild  Sustained Attention: Mild  Memory  Memory: Exceptions to [facility]  Daily Routines: Mild  Long-term Memory: WFL  Short-term Memory: Mild  Working Memory: Mild  Problem Solving  Problem Solving: Exceptions to [facility]  Simple Functional Tasks: Mild  Verbal Reasoning Skills: Mild  Safety/Judgment  Safety/Judgment: Exceptions to [facility]  Complex Functional Tasks: Mild  Routine Tasks: Mild  Unable to Self-monitor and Self-correct Consistently: Mild  Impulsive: Mild  Flexibility of Thought: Mild    Additional Assessments:  Pt was administered the Cognitive Linguistic Quick Test (CLQT) with the following results:    Attention- moderate  Memory- moderate  Executive Functions- moderate  Language- moderate  Visuospatial Skills- moderate  Composite Severity Rating- moderate  Clock Drawing Severity Rating- moderate    Prognosis:  Speech Therapy Prognosis  Prognosis: Good  Prognosis Considerations: Family/Community Support;Previous Level of Function  Individuals consulted  Consulted and agree with results and recommendations: Patient; Family member  Family member consulted: son    Education:  Patient Education: Discussed reasons for SLP evaluation and results/recommendations for tx. Patient Education Response: Verbalizes understanding  Safety Devices in place: Yes  Type of devices: Call light within reach; Left in chair;Chair alarm in place    Therapy Time:   Individual Concurrent Group Co-treatment   Time In 0855         Time Out 0930         Minutes 35            Total Treatment Time: 35     Electronically signed by:  Brennan Zapata M.A., [address] [place] [address]

## 2022-05-19 NOTE — H&P
Department of Physical Medicine & Rehabilitation  History & Physical      Patient Identification:  Kathya Jose  : 1940  Admit date: 2022   Attending provider: Carol Ann Elias DO        Primary care provider: No primary care provider on file. Chief Complaint: weakness, CVA    History of Present Illness/Hospital Course: This is a 71yo woman with a PMH of HTN, HLD, bipolar disorder, schizophrenia, current smoker, with vascular risk factors including poorly controlled HTN and smoking presents with left thalamic stroke which is consistent with small vessel ischemic disease. Prior Level of Function:  Independent for mobility, ADLs, and IADLs    Current Level of Function:  Mod assist    Pertinent Social History:  Support: Lives with son  Home set-up: 3 steps to enter     No past medical history on file. Fall in past year: No    No past surgical history on file. Major Surgery in past 100 days: No    No family history on file. Social History     Socioeconomic History    Marital status: Single     Spouse name: Not on file    Number of children: Not on file    Years of education: Not on file    Highest education level: Not on file   Occupational History    Not on file   Tobacco Use    Smoking status: Current Every Day Smoker     Packs/day: 2.00     Types: Cigarettes    Smokeless tobacco: Never Used   Substance and Sexual Activity    Alcohol use: Never    Drug use: Never    Sexual activity: Not on file   Other Topics Concern    Not on file   Social History Narrative    Not on file     Social Determinants of Health     Financial Resource Strain:     Difficulty of Paying Living Expenses: Not on file   Food Insecurity:     Worried About Running Out of Food in the Last Year: Not on file    Mamadou of Food in the Last Year: Not on file   Transportation Needs:     Lack of Transportation (Medical): Not on file    Lack of Transportation (Non-Medical):  Not on file   Physical Activity:     Days of Exercise per Week: Not on file    Minutes of Exercise per Session: Not on file   Stress:     Feeling of Stress : Not on file   Social Connections:     Frequency of Communication with Friends and Family: Not on file    Frequency of Social Gatherings with Friends and Family: Not on file    Attends Congregational Services: Not on file    Active Member of [address] [place] [address] or Organizations: Not on file    Attends Club or Organization Meetings: Not on file    Marital Status: Not on file   Intimate Partner Violence:     Fear of Current or Ex-Partner: Not on file    Emotionally Abused: Not on file    Physically Abused: Not on file    Sexually Abused: Not on file   Housing Stability:     Unable to Pay for Housing in the Last Year: Not on file    Number of [place] in the Last Year: Not on file    Unstable Housing in the Last Year: Not on file       No Known Allergies      Current Facility-Administered Medications   Medication Dose Route Frequency Provider Last Rate Last Admin    promethazine (PHENERGAN) tablet 12.5 mg  12.5 mg Oral Q6H PRN Junior L Heis, DO        Or    ondansetron (ZOFRAN) injection 4 mg  4 mg IntraVENous Q6H PRN Junior L Heis, DO        sennosides-docusate sodium (SENOKOT-S) 8.6-50 MG tablet 1 tablet  1 tablet Oral BID Junior L Heis, DO   1 tablet at 05/19/22 0943    sodium chloride flush 0.9 % injection 10 mL  10 mL IntraVENous 2 times per day Junior L Heis, DO   10 mL at 05/19/22 0947    sodium chloride flush 0.9 % injection 10 mL  10 mL IntraVENous PRN Junior L Heis, DO        acetaminophen (TYLENOL) tablet 650 mg  650 mg Oral Q4H PRN Junior L Heis, DO        bisacodyl (DULCOLAX) EC tablet 5 mg  5 mg Oral Daily Junior L Heis, DO   5 mg at 05/19/22 0943    enoxaparin (LOVENOX) injection 40 mg  40 mg SubCUTAneous Daily Junior L Heis, DO   40 mg at 05/19/22 0945    magnesium hydroxide (MILK OF MAGNESIA) 400 MG/5ML suspension 30 mL  30 mL Oral Daily PRN Junior L Heis, DO        polyethylene glycol (GLYCOLAX) packet 17 g  17 g Oral Daily PRN Junior L Heis, DO        aspirin chewable tablet 81 mg  81 mg Oral Daily Junior L Heis, DO   81 mg at 05/19/22 0943    atorvastatin (LIPITOR) tablet 80 mg  80 mg Oral Nightly Junior L Heis, DO   80 mg at 05/18/22 2258    furosemide (LASIX) tablet 20 mg  20 mg Oral Daily Juinor L Heis, DO   20 mg at 05/19/22 0943    hydrALAZINE (APRESOLINE) injection 5 mg  5 mg IntraVENous Q4H PRN Junior L Heis, DO        HYDROcodone-acetaminophen (NORCO) 5-325 MG per tablet 1 tablet  1 tablet Oral Q6H PRN Junior L Heis, DO        lisinopril (PRINIVIL;ZESTRIL) tablet 20 mg  20 mg Oral Daily Junior L Heis, DO   20 mg at 05/19/22 0943    [START ON 5/20/2022] lithium tablet 150 mg  150 mg Oral Every Other Day Junior L Heis, DO        memantine (NAMENDA) tablet 5 mg  5 mg Oral BID Junior L Heis, DO   5 mg at 05/19/22 0946    metoprolol (LOPRESSOR) tablet 100 mg  100 mg Oral BID Junior L Heis, DO   100 mg at 05/19/22 0943    miSOPROStol (CYTOTEC) tablet 100 mcg  100 mcg Oral BID [place] Heis, DO   100 mcg at 05/19/22 0946    Vitamin D (CHOLECALCIFEROL) tablet 1,000 Units  1,000 Units Oral Daily Junior L Heis, DO   1,000 Units at 05/19/22 0943         REVIEW OF SYSTEMS:   CONSTITUTIONAL: negative for fevers, chills, diaphoresis, appetite change, night sweats, unexpected weight change, +fatigue. EYES: negative for blurred vision, eye discharge, visual disturbance and icterus. HEENT: negative for hearing loss, tinnitus, ear drainage, sinus pressure, nasal congestion, epistaxis and snoring. RESPIRATORY: Negative for hemoptysis, cough, sputum production. CARDIOVASCULAR: negative for chest pain, palpitations, exertional chest pressure/discomfort, syncope, edema   GASTROINTESTINAL: negative for nausea, vomiting, diarrhea, blood in stool, abdominal pain, constipation.    GENITOURINARY: negative for frequency, dysuria, urinary incontinence, decreased urine volume, and hematuria. HEMATOLOGIC/LYMPHATIC: negative for easy bruising, bleeding and lymphadenopathy. ALLERGIC/IMMUNOLOGIC: negative for recurrent infections, angioedema, anaphylaxis and drug reactions. ENDOCRINE: negative for weight changes and diabetic symptoms including polyuria, polydipsia and polyphagia. MUSCULOSKELETAL: negative for pain, joint swelling, decreased range of motion. NEUROLOGICAL: negative for headaches, slurred speech, unilateral weakness. PSYCHIATRIC/BEHAVIORAL: negative for hallucinations, behavioral problems, confusion and agitation. All pertinent positives are noted in the HPI. Physical Examination:  Vitals:   Patient Vitals for the past 24 hrs:   BP Temp Temp src Pulse Resp SpO2 Height Weight   05/19/22 0952 -- 98.1 °F (36.7 °C) Oral 57 16 -- -- --   05/19/22 0943 127/70 -- -- -- -- -- -- --   05/18/22 2301 (!) 158/58 98.2 °F (36.8 °C) Oral 69 18 94 % -- --   05/18/22 2107 -- -- -- -- -- -- 5' 4\" (1.626 m) 161 lb 9.6 oz (73.3 kg)       Const: Alert. WDWN. No distress  Eyes: Conjunctiva noninjected, no icterus noted; pupils equal, round, and reactive to light. HENT: Atraumatic, normocephalic; Oral mucosa moist  Neck: Trachea midline, neck supple. No thyromegaly noted. CV: Regular rate and rhythm, no murmur rub or gallop noted  Resp: Lungs clear to auscultation bilaterally, no rales wheezes or ronchi, no retractions. Respirations unlabored. GI: Soft, nontender, nondistended. Normal bowel sounds. No palpable masses. Skin: Normal temperature and turgor. No rashes or breakdown noted. Ext: No significant edema appreciated. No varicosities. MSK: No joint tenderness, erythema, warmth noted. AROM intact. Neuro: RUE 4/5, RLE 4/5  -Mental status: Alert. Oriented to person, place, time, situation. 3 word immediate and delayed recall (sock, bed, blue) intact. Attention intact (months of year in reverse).    -Language: Speech fluent, repetition and naming intact  -Cranial nerves: VFF, PERRL, EOMI, Facial sensation intact, Face symmetric, Hearing intact, Palate elevation symmetric, Shoulder shrug intact. Tongue midline.   -Sensation intact to light touch. -Motor examination reveals normal strength in all four limbs diffusely.   -No abnormalities with finger/nose noted. -Reflexes 2+ and symmetric. Negative Margot  Psych: Stable mood, normal judgement, normal affect     Lab Results   Component Value Date    WBC 9.7 05/17/2022    HGB 11.8 (L) 05/17/2022    HCT 35.0 (L) 05/17/2022    .0 (H) 05/17/2022     05/17/2022     Lab Results   Component Value Date    INR 0.98 05/16/2022    PROTIME 11.1 05/16/2022     Lab Results   Component Value Date    CREATININE 0.6 05/17/2022    BUN 11 05/17/2022     05/17/2022    K 3.7 05/17/2022     05/17/2022    CO2 28 05/17/2022     Lab Results   Component Value Date    ALT 11 05/16/2022    AST 15 05/16/2022    ALKPHOS 74 05/16/2022    BILITOT 0.4 05/16/2022         No orders to display       The above laboratory data have been reviewed. The above imaging data have been reviewed. The above medical testing have been reviewed. Body mass index is 27.74 kg/m². POST ADMISSION PHYSICIAN EVALUATION  The patient has agreed to being admitted to our comprehensive inpatient rehabilitation facility and can tolerate the intensity of service consisting of at least:  --180 minutes of therapy a day, 5 out of 7 days a week. OR  --15 hours of intensive therapy within a 7 consecutive day period. The patient/family has a good understanding of our discharge process and will benefit from an interdisciplinary inpatient rehabilitation program. The patient has potential to make improvement and is in need of at least two of the following multidisciplinary therapies including but not limited to physical, occupational, respiratory, and speech, nutritional services, wound care, and prosthetics and orthotics.  Given the patients complex condition and risk of further medical complications, rehabilitation services cannot be safely provided at a lower level of care such as a skilled nursing facility. All of the goals listed below were reviewed with the patient and he/she is in agreement. I have compared the patients medical and functional status at the time of the preadmission screening and the same on this date, and there are no significant changes. By signing this document, I acknowledge that I have personally performed a full physical examination on this patient within 24 hours of admission to this inpatient rehabilitation facility and have determined the patient to be able to tolerate the above course of treatment at an intensive level for a reasonable period of time. I will be completing a detailed individualized  Plan of Care for this patient by day four of the patients stay based upon the Preadmission Screen, this Post-Admission Evaluation, and the therapy evaluations. Barriers: medical comorbidities  Services Required: PT, OT, SLP  Goals: increase functional mobility and independence  Prognosis: Good  Anticipated Dispo: Home  ELOS: TBD    Rehabilitation Diagnosis:   Stroke, 1.2, Right Body (L Brain)      Assessment and Plan:  1. 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 Dysphagia- ALP evaluation MBS failed in the past  3. Dysarthria- SLP evaluation   4. Right apical lung nodule- found on CT- 3 month follow up   5. Schizophrenia- continue home medications         Impairments- Decreased functional mobility, Decreased ADLs    Impairments: Decreased functional mobility, Decreased ADLs    Bladder - high risk retention - Monitor PVRs, SC prn >300cc    Bowel - high risk constipation - colace BID, PRN miralax and MoM. follow bowel movements. Enema or suppository if needed.      Safety - fall precautions    PPx  DVT: lovenox  GI: pantoprazole    FULL CODE    Lorenza Hogan MD PGY3    This patient has been seen, examined, and discussed with the resident. This note has been altered to reflect my own examination findings, impression, and recommendations.      WEST SchraderP.H  PM&R  5/19/2022  11:31 AM

## 2022-05-19 NOTE — PLAN OF CARE
ARU PATIENT TREATMENT PLAN  [address]  [phone]    Carmen Veronica    : 1940  Acct #: [[id number]]  MRN: [id number]  PHYSICIAN:  Alida Oneill DO  Primary Problem    Patient Active Problem List   Diagnosis    [facility]al[facility] stroke ([facility]    Primary hypertension    Smoking    B[facility] disorder ([facility]    Acute cerebrovascular accident (CVA) ([facility]       Rehabilitation Diagnosis:  Stroke, 1.2, Right Body (L Brain)  ADMIT DATE:2022    Patient Goals: to return home  Admitting Impairments: Decreased functional mobility ; Decreased ADL status; Decreased ROM; Decreased strength;Decreased safe awareness;Decreased endurance;Decreased fine motor control;Decreased coordination;Decreased balance;Mild oral stage dysphagia; Moderate cognitive-linguistic impairments; Mild dysarthria   Activity Restrictions: None  Participation Limitations: None   CARE PLAN   NURSING:  Carmen Veronica while on this unit will:  [x] Be continent of bowel and bladder     [x] Have an adequate number of bowel movements  [] Urinate with no urinary retention >300ml in bladder  [] Complete bladder protocol with alexander removal  [] Maintain O2 SATs at ___%  [x] Have pain managed while on ARU       [] Be pain free by discharge   [x] Have no skin breakdown while on ARU  [] Have improved skin integrity via wound measurements  [] Have no signs/symptoms of infection at the wound site  [x] Be free from injury during hospitalization   [] Complete education with patient/family with understanding demonstrated for:  [] Adjustment   [] Other:     Nursing Interventions will include:  [] bowel/bladder training   [x] education for medical assistive devices   [x] medication education   [] O2 saturation management   [x] energy conservation   [x] stress management techniques   [x] fall prevention   [x] alarms protocol   [x] seating and positioning   [] skin/wound care   [x] pressure relief instruction   [] dressing changes     [] infection protection   [x] DVT prophylaxis  [x] assistance with in room safety with transfers to bed, toilet, wheelchair, shower   [x] bathroom activities and hygiene  [] Other:    Patient/Caregiver Education for:  [x] Disease/sustained injury/management     [x] Medication Use  [] Surgical intervention  [x] Safety  [x] Body mechanics and or joint protection  [x] Health maintenance     [] Other:     PHYSICAL THERAPY:  Goals:                               Long Term Goals  Time Frame for Long term goals : 2 weeks  Long term goal 1: Pt will complete bed mobility independently  Long term goal 2: Pt will complete sit<>stand transfers with mod I and LRAD  Long term goal 3: Pt will complete 150' ambulation with mod I and LRAD  Long term goal 4: Pt will complete 5 steps with B handrails and supervision  Long term goal 5: Pt will complete car transfer with mod I and LRAD  These goals were reviewed with this patient at the time of assessment and Sander Ramesh is in agreement.      Plan of Care: Pt to be seen 5 out of 7 days per week per ARU protocol (60 minutes with PT)                  Current Treatment Recommendations: Strengthening,ROM,Balance training,Functional mobility training,Transfer training,Endurance training,Gait training,Stair training    OCCUPATIONAL THERAPY:  Goals:             Short Term Goals  Time Frame for Short term goals: 2 Weeks  Short Term Goal 1: Pt will complete UE bathing and UE dressing i'ly - ongoing  Short Term Goal 2: Pt will complete LE bathing and LE dressing Mod I - ongoing  Short Term Goal 3: Pt will complete toileting, including toilet transfer, Mod I - ongoing  Short Term Goal 4: Pt will demonstrate independence w/ RUE HEP to increase strength for ADLs - ongoing  Short Term Goal 5: Pt will complete standing-level grooming > 5 mins Mod I - ongoing :    :  These goals were reviewed with this patient at the time of assessment and Sander Ramesh is in agreement    Plan of Care:  Pt to be seen 5 out of 7 days per week per ARU protocol (60 minutes with OT)       SPEECH THERAPY: Goals will be left blank if speech is not following this patient. Goals:  Dysphagia:  Short-term Goals  Goal 1: Pt will consume least restrictive diet with adequate oral clearance via use of strategies and no s/s penetration/aspiration. Dysphagia Goals: The patient will improve oral preparation phase via bolus control/manipulation exercises to 5/5 each trial.,The patient will improve oral motor function via therapeutic oral motor exercises to 5/5 each trial.,The patient/caregiver will demonstrate understanding of compensatory strategies for improved swallowing safety. Cognitive/Speech: Short-term Goals  Goal 1: Pt will complete problem solving tasks given min cues with 80% accuracy. Goal 2: Pt will utilize compensatory strategies for recall given min cues with 80% accuracy. Goal 3: Pt will sustain attention for task completion given no more than x3 redirections/repetitions of instructions. Goal 4: Pt will utilize speech strategies in basic conversational tasks for increased intelligibility of 90%.                  Plan of Care:  Pt to be seen 5 out of 7 days per week per ARU protocol (60 minutes with SLP)    Therapy Treatments will include:  [x]  therapeutic exercises    [x]  gait training     [x]  neuromuscular re-ed                            [x]  transfer training             [] community reintegration    [x] bed mobility                          []  w/c mobility and training  [x]  self care    [x]home mgmt    [x]  cognitive training            [x]  energy conservation        [x]  dysphagia tx    [x]  speech/language/communication therapy   []  group therapy    [x]  patient/family education    [] Other:    CASE MANAGEMENT:  Goals: Assist patient/family with discharge planning, patient/family counseling, and coordination with insurance during ARU stay.    Admission Period/Goal QM SCORES  QM Admit/Goal Score   Eating CARE Score: 5 / Discharge Goal: Independent   Oral Hygiene CARE Score: 3 / Discharge Goal: Independent   Shower/Bathing CARE Score: 2 / Discharge Goal: Independent   UB Dressing CARE Score: 3 / Discharge Goal: Independent   LB Dressing CARE Score: 2 / Discharge Goal: Independent   Putting on/off Footwear CARE Score: 1 / Discharge Goal: Independent   Toileting Hygiene CARE Score: 3 / Discharge Goal: Independent   Bladder Continence Bladder Continence: Incontinent daily    Bowel Continence Bowel Continence: Always continent    Toilet Transfers CARE Score: 3 / Discharge Goal: Independent   Shower/Bathe Self  CARE Score: 2 / Discharge Goal: Independent   Rolling Left and Right CARE Score: 4 / Discharge Goal: Independent   Sit to Lying CARE Score: 3 / Discharge Goal: Independent   Lying to Sitting on Bedside CARE Score: 4 / Discharge Goal: Independent   Sit to Stand CARE Score: 4 / Discharge Goal: Independent   Chair/Bed to Chair Transfer CARE Score: 4 / Discharge Goal: Independent   Car Transfers CARE Score: 3 / Discharge Goal: Independent   Walk 10 Feet CARE Score: 4 / Discharge Goal: Independent   Walk 50 Feet with Two Turns CARE Score: 4 / Discharge Goal: Independent   Walk 150 Feet CARE Score: 4 / Discharge Goal: Independent   Walk 10 Feet on Uneven Surfaces CARE Score: 4 / Discharge Goal: Independent   1 Step (Curb) CARE Score: 3 / Discharge Goal: Supervision or touching assistance   4 Steps CARE Score: 88 / Discharge Goal: Supervision or touching assistance   12 Steps CARE Score: 88 / Discharge Goal: Supervision or touching assistance   Picking up Object from Floor CARE Score: 88 / Discharge Goal: Independent   Wheel 50 Feet with 2 Turns   /     Type         [] Manual        [] Motorized        [] N/A   Wheel 150 Feet   /     Type         [] Manual        [] Motorized        [] N/A        Louann Joshua will be seen a minimum of 3 hours of therapy per day, a minimum of 5 out of 7 days per week (please see above for specific treatment plan per PT/OT/SLP). [] In this rare instance due to the nature of this patient's medical involvement, this patient will be seen 15 hours per week (900 minutes within a 7day period). In addition, dietician/nutritionist may monitor calorie count as well as intake and collaboratively work with SLP on dietary upgrades. Neuropsychology/Psychology may evaluate and provide necessary support. Medical issues being managed closely and that require 24hour availability of a physician:  [x] Swallowing Precautions  [x] Bowel/Bladder Fx  [] Weight bearing precautions  [] Wound Care    [x] Pain Mgmt   [] Infection Protection  [x] DVT Prophylaxis   [x] Fall Precautions  [x] Fluid/Electrolyte/Nutrition Balance  [x] Voice Protection   [] Respiratory  [] Other:    Medical Prognosis: [x] Good  [] Fair    [] Guarded   Total expected IRF days 12  Anticipated discharge destination:   [x] Home Independently   [] Home Modified Independent  [x] Home with supervision    []SNF     [] Other                                           Physician anticipated functional outcomes: Pt will progress to a level of supervision to MOD I for all ADLs and functional mobility to allow for a safe return home. IPOC brief synthesis: This is a 66yo woman with a PMH of HTN, HLD, bipolar disorder, schizophrenia, current smoker, with vascular risk factors including poorly controlled HTN and smoking presents with left thalamic stroke which is consistent with small vessel ischemic disease. This initial ARU patient treatment plan of care, together with the IPOC & the Education plan, form the foundation for the patient's plan of care. Weekly patient care conferences are held to evaluate progress towards the initial treatment plan & goals.     I have reviewed this initial plan of care and agree with its contents:    Title   Name    Date    Time    Physician: WEST ClementP.H  PM&R  5/20/2022  9:22 AM        Case Mgmt:  Chichaim Fuentes, LISW  5/19/22  3:30 PM    OT: Isaura Carr, OTR/L 5/19/2022 956    PT:  Orestes Long PT, [place] [id number] 5/19/22 @ 1556    RN: Bertha Segura RN 5/19/22 2:35 AM    ST: Seth Garcia, CCC-SLP 5/19/2022 @ 1415    ARU Supervisor: Deepa Villanueva, PT, DPT 5/20/2022 @ 0002    Other:

## 2022-05-19 NOTE — PLAN OF CARE
Problem: Safety - Adult  Goal: Free from fall injury  Note: Remains free from falls. Call light within reach and alarms in use at all times.  Calls appropriately for assist.

## 2022-05-19 NOTE — PROGRESS NOTES
Speech Language Pathology  Facility/Department: [facility] ACUTE REHAB UNIT   CLINICAL BEDSIDE SWALLOW EVALUATION    NAME: Jack Rajan  : 1940  MRN: [id number]    ADMISSION DATE: 2022  ADMITTING DIAGNOSIS: has Thalamic stroke ([facility] Primary hypertension; Smoking; Bipolar disorder ([facility] and Acute cerebrovascular accident (CVA) ([facility] on their problem list.  ONSET DATE: 22     Recent Chest Xray/CT of Chest: 22  Impression: Bilateral perihilar opacities and prominence of the pulmonary vasculature    Date of Eval: 2022  Evaluating Therapist: MARGOT Bernardo    Current Diet level:  Current Diet : Soft and Bite-Sized, Thin Liquids    Primary Complaint  Patient Complaint: Pt with c/o PO on lingual surface and intermittent drooling. Pain:  Pain Assessment  Pain Assessment: None - Denies Pain  Pain Level: 0    Reason for Referral  Jack Rajan was referred for a bedside swallow evaluation to assess the efficiency of her swallow function, identify signs and symptoms of aspiration and make recommendations regarding safe dietary consistencies, effective compensatory strategies, and safe eating environment. Impression  Dysphagia Diagnosis: Mild oral stage dysphagia  Dysphagia Impression : Pt presents with mild oral phase dysphagia. Dysphagia Outcome Severity Scale: Level 5: Mild dysphagia- Distant supervision. May need one diet consistency restricted     Recent MBS completed 22:  \"Pt presents with mild-moderate oral phase dysphagia. Pt with prolonged mastication with solids, tongue pumping with pureed and solids with impaired A-P transport. After trial with thin by cup, pt noted to have residue remaining in the oral cavity in antrior sulci which pt did not appear to sensate. Pt was able to clear when instructed to swallow. Pt able to trigger a swallow in a timely manner with good laryngeal vestibule closure.  Pt with no instances of aspiration or penetration with any consistency, despite failing [place] swallow screen 2x with RN and coughing when consuming 3 successive swallows of water at the bedside. Pt had no residue remaining in valleculae or pyriform after the swallow with any consistency. \"     Treatment Plan  Requires SLP Intervention: Yes  Duration of Treatment: 1-2 weeks or LOS  D/C Recommendations: Ongoing speech therapy is recommended during this hospitalization     Recommended Diet and Intervention  Recommended Solids: Soft and Bite Sized  Recommended Liquids: Thin  Recommended Form of Meds: Meds in puree  Recommendations: Dysphagia treatment;Setup assist  Therapeutic Interventions: Patient/Family education;Diet tolerance monitoring;Oral motor exercises; Bolus control exercises    Compensatory Swallowing Strategies  Compensatory Swallowing Strategies : Eat/Feed slowly;Upright as possible for all oral intake;Remain upright for 30-45 minutes after meals;Small bites/sips; Check for pocketing of food on the Right; Alternate solids and liquids; Set up assist;Lingual sweep    Treatment/Goals  Short-term Goals  Goal 1: Pt will consume least restrictive diet with adequate oral clearance via use of strategies and no s/s penetration/aspiration. Goal 2: The patient will improve oral preparation phase via bolus control/manipulation exercises to 5/5 each trial.  Goal 3: The patient will improve oral motor function via therapeutic oral motor exercises to 5/5 each trial.  Goal 4: The patient/caregiver will demonstrate understanding of compensatory strategies for improved swallowing safety. General  Chart Reviewed: Yes  Comments: Per MD H&P \"This is a 73yo woman with a PMH of HTN, HLD, bipolar disorder, schizophrenia, current smoker, with vascular risk factors including poorly controlled HTN and smoking presents with left thalamic stroke which is consistent with small vessel ischemic disease\". Subjective  Subjective: Pt seated upright in chair agreeable to SLP evaluation at this time.

## 2022-05-19 NOTE — PROGRESS NOTES
Occupational Therapy  Facility/Department: [facility]  Occupational Therapy Initial Assessment / Treatment    Name: Camilo Cordova  : 1940  MRN: [id number]  Date of Service: 2022    Discharge Recommendations:  24 hour supervision or assist,Home with Home health OT  OT Equipment Recommendations  Equipment Needed: No  Other: cont to assess       Patient Diagnosis(es): There were no encounter diagnoses. Past Medical History:  has no past medical history on file. Past Surgical History:  has no past surgical history on file. Treatment Diagnosis: impaired ADL, mobility, and RUE functional use d/t CVA      Assessment   Performance deficits / Impairments: Decreased functional mobility ; Decreased ADL status; Decreased ROM; Decreased strength;Decreased safe awareness;Decreased endurance;Decreased fine motor control;Decreased coordination;Decreased balance  Assessment: Pt is a 80 y.o. F who presented to [facility] s/p CVA. Pt is typically independent with all ADLs and requires assistance from her grandchildren for IADLs. Pt presents below functional baseline status and required Min A for UE ADLs and Max A for LE ADLs. Pt is limited by decreased RUE strength and ROM, impaired standing balance, and decreased activity tolerance. Pt benefits from skilled OT to maximize independence and safety with functional tasks, cont OT per POC.   Treatment Diagnosis: impaired ADL, mobility, and RUE functional use d/t CVA  Prognosis: Good  Decision Making: Medium Complexity  Activity Tolerance  Activity Tolerance: Patient Tolerated treatment well;Patient limited by fatigue  Activity Tolerance Comments: Pt verbalized fatigue after shower        Plan   Plan  Times per Week: 5x/week, 60 mins/day  Times per Day: Daily  Current Treatment Recommendations: Strengthening,ROM,Balance training,Functional mobility training,Endurance training,Gait training,Cognitive reorientation,Neuromuscular re-education,Safety education & training,Self-Care / ADL,Patient/Caregiver education & training,Home management training     Restrictions  Restrictions/Precautions  Restrictions/Precautions: Seizure,Fall Risk  Position Activity Restriction  Other position/activity restrictions: up with assistance    Subjective   General  Chart Reviewed: Yes  Patient assessed for rehabilitation services?: Yes  Additional Pertinent Hx: Pt is a 80 y.o. female admitted to [facility] w/ right sided weakness and facial droop. 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 Pt admitted to ARU 5/18  Family / Caregiver Present: No  Referring Practitioner: Angelita Bojorquez. Heinocencio  Diagnosis: CVA  Subjective  Subjective: Pt side-lying in bed upon arrival, pleasant and agreeable to OT eval and treatment. Pt denied pain. General Comment  Comments: .      Social/Functional History  Social/Functional History  Lives With: Family (grandson)  Type of Home: House  Home Layout: Two level,Able to Live on Main level with bedroom/bathroom,Performs ADL's on one level,Laundry in basement  Home Access: Stairs to enter with rails  Entrance Stairs - Number of Steps: 5  Entrance Stairs - Rails: Both  Bathroom Shower/Tub: Tub/Shower unit  Bathroom Toilet:  (handicap toilet that goes over toilet, grab bars)  Bathroom Equipment: Grab bars in shower,Grab bars around toilet,Tub transfer bench  Bathroom Accessibility: Walker accessible  Home Equipment: Reacher,Cane,Hospital bed,Walker, rolling,Rollator,Long-handled shoehorn,Alert Button  Has the patient had two or more falls in the past year or any fall with injury in the past year?: Yes (pt reports two falls, no injury)  ADL Assistance: Independent  Homemaking Assistance: Needs assistance (grandson and granddaughter help with cooking, cleaning, laundry. has groceries delivered)  Meal Prep: Minimal  Laundry: Minimal  Cleaning: Minimal  Shopping: Other (comment) (grandson gets groceries)  Homemaking Responsibilities: No (grandson manages finances and medications)  Ambulation Assistance: Independent (w/ rollator)  Transfer Assistance: Independent  Active : No  Patient's  Info: grandkids drive to appointments  Mode of Transportation: Family  Education: 10 years  Occupation: Retired  Type of Occupation: [email or web address] work  Leisure & Hobbies: crossword puzzles, reading, watching TV  Additional Comments: [address] son lives with pt full time, granddaughter comes over most of the day every day.  is currently in [address]       Objective   Pulse: 57  Heart Rate Source: Monitor  BP: 127/70  BP Location: Left upper arm  BP Method: Automatic  Patient Position: Sitting  MAP (Calculated): 89  Resp: 16  Vision Exceptions: Wears glasses for reading  Hearing: Exceptions to [facility]  Hearing Exceptions: Hard of hearing/hearing concerns (had hearing aids but lost them)       Observation/Palpation  Posture: Fair  Safety Devices  Type of Devices: Call light within reach; Chair alarm in place; Left in chair  Gait  Overall Level of Assistance: Minimum assistance;Contact-guard assistance (to/from bathroom; VCs to place RUE on walker)  Assistive Device: Walker, rolling  Toilet Transfers  Toilet - Technique: Ambulating  Equipment Used: Grab bars  Toilet Transfer: Minimal assistance  Shower Transfers  Shower - Transfer From: Rosario Landers - Transfer Type: To and From  Shower - Transfer To:  Transfer tub bench  Shower - Technique: Ambulating  Shower Transfers: Minimal assistance  Shower Transfers Comments: VCs for safe approach to TTB; Use of GBs     ADL  Grooming: Minimal assistance;Setup  Grooming Skilled Clinical Factors: Pt brushed and applied dentures seated on TTB w/ setup of supplies and min A to hold dentures while brushing. Pt combed hair seated on TTB w/ Min A to comb R side of head. UE Bathing: Minimal assistance  UE Bathing Skilled Clinical Factors: Pt washed/dried chest, abdomen, and RUE seated on TTB. Pt required Min A to wash LUE d/t decreased R  strength and inability to maintain grasp on washcloth and assist to thoroughly dry upper body. LE Bathing: Maximum assistance  LE Bathing Skilled Clinical Factors: Pt washed/dried cameron area and thighs seated on TTB. Pt required assistance to wash/dry BLEs and feet seated on TTB and wash/dry rear cameron area in stance w/ BUE support on GB and CGA. UE Dressing: Minimal assistance; Increased time to complete;Setup  UE Dressing Skilled Clinical Factors: Pt doffed gown and donned t-shirt seated on TTB. Pt required Min A to thread RUE and pull over shoulder. LE Dressing: Maximum assistance; Increased time to complete;Setup  LE Dressing Skilled Clinical Factors: Pt doffed brief seated on RTS by pushing off with feet. Pt required assistance to thread BLEs into brief and pants seated on TTB and completed pants mgmt up over hips in stance w/ CGA, 1 UE support on GB, and assist to pull pants up on R side and in back. Pt required 2 trials of standing to pull pants up d/t fatigue. Footwear: Pt required total assist to doff/don  socks. Toileting: Minimal assistance;Setup; Increased time to complete  Toileting Skilled Clinical Factors: Pt completed pants mgmt down w/ CGA-Min A to maintain balance and increased time to complete. Pt continent of urine and completed front cameron area seated w/ set up of wipes. Additional Comments: Pt typically sits on shower chair to complete bathing at baseline.         Bed mobility  Supine to Sit: Minimal assistance (Pt required Min A for trunk ascension; HOB flat w/ use of bedrail)  Transfers  Sit to stand: Contact guard assistance (bed > RW, recliner > RW)  Stand to sit: Contact guard assistance (RW > recliner)  Transfer Comments: VCs for hand placement  Vision - Basic Assessment  Patient Visual Report: No visual complaint reported. Cognition  Overall Cognitive Status: Exceptions  Arousal/Alertness: Appropriate responses to stimuli  Following Commands: Follows one step commands with increased time; Follows multistep commands with increased time  Attention Span: Difficulty dividing attention  Memory: Decreased short term memory  Safety Judgement: Decreased awareness of need for assistance;Decreased awareness of need for safety  Problem Solving: Assistance required to generate solutions  Insights: Decreased awareness of deficits  Initiation: Does not require cues  Sequencing: Requires cues for some  Perception  Overall Perceptual Status: Impaired  Unilateral Attention: Cues to attend to right side of body (min VCs to attend to RUE)  Initiation: Cues to initiate tasks               Education Given To: Patient  Education Provided: Role of Therapy;Plan of Care;Transfer Training;Energy Conservation; ADL Adaptive Strategies  Education Method: Verbal  Barriers to Learning: Cognition  Education Outcome: Verbalized understanding;Continued education needed  LUE AROM (degrees)  LUE AROM : WNL  Left Hand AROM (degrees)  Left Hand AROM: WNL  RUE PROM (degrees)  RUE PROM: WNL  RUE AROM (degrees)  RUE AROM : Exceptions  R Shoulder Flexion 0-180: very minimal active movement noted  R Elbow Flexion 0-145: very minimal active movement noted  R Wrist Flexion 0-80: 0-40  R Wrist Extension 0-70: 0-40  Right Hand PROM (degrees)  Right Hand PROM: WFL  Right Hand AROM (degrees)  Right Hand AROM: Exceptions  Right Hand General AROM: weak grasp                     G-Code     OutComes Score                                                  AM-PAC Score             Goals  Short Term Goals  Time Frame for Short term goals: 2 Weeks  Short Term Goal 1: Pt will complete UE bathing and UE dressing i'ly - ongoing  Short Term Goal 2: Pt will complete LE bathing and LE dressing Mod I - ongoing  Short Term Goal 3: Pt will complete toileting, including toilet transfer, Mod I - ongoing  Short Term Goal 4: Pt will demonstrate independence w/ RUE HEP to increase strength for ADLs - ongoing  Short Term Goal 5: Pt will complete standing-level grooming > 5 mins Mod I - ongoing  Patient Goals   Patient goals : \"to get through therapy\"       Therapy Time   Individual Concurrent Group Co-treatment   Time In 0730         Time Out 0830         Minutes [id number] [place] [address]

## 2022-05-19 NOTE — PROGRESS NOTES
NURSING ASSESSMENT: ARU ADMISSION  [facility]    Patient:Andria Lynne     Rehab Dx/Hx: Acute cerebrovascular accident (CVA) [facility]) [I63.9]   :1940  ZRM:[id number]  Date of Admit: 2022  Room #: 3106/3106-01    Subjective:   Patient admitted to room 3106. Alert and oriented x4. Oriented to room and call light system. Oriented to rehab routine and therapy schedules. Informed about care conferences and ordering of meals. Drug / Medication Review:   Medications were reviewed by RN at time of admission  [x]  No potential or actual clinically significant medication issues were noted.      []   Yes, a clinically significant medication issue was identified                 []  Adverse Drug Event:                  []  Allergy:                  []  Side Effect:                  []  Ineffective Therapy:                  []  Drug Interaction:                 []  Duplicated Therapy:                 []  Untreated Indication:                  []  Non-adherence:                 []  Other:                  Nursing/Pharmacy contacted the physician:     Date:              Time:                  Actions recommended by physician were completed:   Date :            Time:    4 Eyes Skin Assessment   The patient is being assessed for: Admission     I agree that 2 RN's have performed a thorough Head to Toe Skin Assessment on the patient. ALL assessment sites listed below have been assessed. Areas assessed by both nurses:   [x]   Head, Face, and Ears   [x]   Shoulders, Back, and Chest, Abdomen  [x]   Arms, Elbows, and Hands   [x]   Coccyx, Sacrum, and Ischium  [x]   Legs, Feet, and Heel     Does the Patient have Skin Breakdown? No, gluteal slit. Circular beige discoloration to R buttocks-Appears to be possible scar tissue.           [place] Prevention initiated:  Yes   Wound Care Orders initiated:   Not Applicable      [facility] nurse consulted for Pressure Injury (Stage 3,4, Unstageable, DTI, NWPT, Complex wounds)and New or Established Ostomies: N/A    Primary Nurse eSignature: Electronically signed by Rashmi Schmidt RN on 5/19/2022 at 6:09 AM  Co-signer eSignature:  Electronically signed by Lazarus Alliance, RN on 5/19/2022 at 6:35 AM

## 2022-05-19 NOTE — CARE COORDINATION
CM following. CM met with pt and son, Rossy Leyva at bedside. He reported that son, Marybeth Iniguez is main contact (and likely POA). Pt is from home with family, they would like for her to return at DC from ARU but also discussed possible need for SNF before home. CM advised that CM follow along and coordinate placement if needed. Pt reported having needed DME at home, and being in agreement with Concepcion Leal at DC. CM will continue to follow for DC needs and recs. Maritza Savage, RN care manager with [address] [place] [address] [phone] (from pt's PCP office) called CM, would like to be notified when DC date becomes available.         Electronically signed by DAVID Hong, LSW on 5/19/2022 at 3:44 PM

## 2022-05-19 NOTE — PROGRESS NOTES
Physical Therapy  Facility/Department: [facility]  Rehabilitation Physical Therapy Initial Assessment    NAME: Gunjan Tabares  : 1940 (53 y.o.)  MRN: [id number]  CODE STATUS: Full Code    Date of Service: 22    Chart Reviewed: Yes  Patient assessed for rehabilitation services?: Yes  Additional Pertinent Hx: Pt is a 81 yo female admitted 22 for CVA. 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 Restrictions:  Restrictions/Precautions: Seizure; Fall Risk  Position Activity Restriction  Other position/activity restrictions: up with assistance     SUBJECTIVE  Subjective: Pt seated in recliner upon approach and agreeable to PT. Denies Pain. Prior Level of Function:  Social/Functional History  Lives With: Family (grandson)  Type of Home: House  Home Layout: Two level,Able to Live on Main level with bedroom/bathroom,Performs ADL's on one level,Laundry in basement  Home Access: Stairs to enter with rails  Entrance Stairs - Number of Steps: 5  Entrance Stairs - Rails: Both  Bathroom Shower/Tub: Tub/Shower unit  Bathroom Toilet:  (handicap toilet that goes over toilet, grab bars)  Bathroom Equipment: Grab bars in shower,Grab bars around toilet,Tub transfer bench  Bathroom Accessibility: Walker accessible  Home Equipment: Reacher,Cane,Hospital bed,Walker, rolling,Rollator,Long-handled shoehorn,Alert Button  ADL Assistance: Independent  Homemaking Assistance: Needs assistance (grandson and granddaughter help with cooking, cleaning, laundry.  has groceries delivered)  Ambulation Assistance: RW)  Comments: SBA for seated balance on commode without UE support, CGA for brief/pants management in standing without UE support    Environmental Mobility  Ambulation  Surface: level tile;uneven;ramp  Device: Rolling Walker  Assistance: Contact guard assistance;Minimal assistance (CGA for level and ramp ambulation with exception of 1 instance of min)  Quality of Gait: decreased B LE step height/length with decreased heel strike, drifts to R with VC/ visual cues required to correct, forward flexed posture, downward gaze, 1 instance of scissoring requiring min A to correct  Gait Deviations: Slow Kaelyn;Decreased step length;Decreased step height;Shuffles  Distance: 10'x 2 , 150', 20' (ascent/descent of 10')  Comments: VC to correct drift, for forward gaze/upright posture and increased step height  Stairs/Curb  Stairs?: Yes  Stairs  # Steps : 3  Stairs Height: 6\"  Rails: Bilateral  Device: No Device  Assistance: Minimal assistance  Comment: VC for step to pattern, 2 posterior LOB requiring min A to correct         ASSESSMENT       Activity Tolerance  Activity Tolerance: Patient limited by fatigue;Patient tolerated evaluation without incident;Patient tolerated treatment well  Activity Tolerance Comments: pt reports \"I'm tired\" throughout session and required extended seated rests    Assessment  Assessment: Pt admitted to [facility] s/p CVA with above deficits. Pt requires CGA-min for amb with RW and navigation of 3 steps- tendancy to vear to R during amb with VC to correct. Pt would benefit from continued skilled PT in order to progress functional mobility and independence.   Treatment Diagnosis: decreased functional mobility  Therapy Prognosis: Good  Decision Making: Medium Complexity  Barriers to Learning: decreased short term memory  Discharge Recommendations: 24 hour supervision or assist;Home with Home health PT  PT D/C Equipment  Other: Pt owns RW/rollator/cane  PT Equipment Recommendations  Equipment Needed: No  Other: Pt owns RW/rollator/cane    CLINICAL IMPRESSION   Pt admitted to [facility] s/p CVA with above deficits. Pt requires CGA-min for amb with RW and navigation of 3 steps- tendancy to vear to R during amb with VC to correct. Fatigues with activity and requires extended seated rest. Pt would benefit from continued skilled PT in order to progress functional mobility and independence. GOALS  Patient Goals   Patient goals : to return home  Long Term Goals  Time Frame for Long term goals : 2 weeks  Long term goal 1: Pt will complete bed mobility independently  Long term goal 2: Pt will complete sit<>stand transfers with mod I and LRAD  Long term goal 3: Pt will complete 150' ambulation with mod I and LRAD  Long term goal 4: Pt will complete 5 steps with B handrails and supervision  Long term goal 5: Pt will complete car transfer with mod I and LRAD    PLAN OF CARE  Frequency: 1-2 treatment sessions per day, 5-7 days per week  Plan  Plan:  (60 min, 5 days a week)  Current Treatment Recommendations: Strengthening;ROM;Balance training;Functional mobility training;Transfer training; Endurance training;Gait training;Stair training  Safety Devices  Type of Devices: Call light within reach; Bed alarm in place; Left in bed;Nurse notified      Therapy Time   Individual Concurrent Group Co-treatment   Time In  6356         Time Out  1415         Minutes  60               Time Coded Tx Minutes: [place] [facility], [address] [place] [address]

## 2022-05-20 LAB
ANION GAP SERPL CALCULATED.3IONS-SCNC: 7 MMOL/L (ref 3–16)
BASOPHILS ABSOLUTE: 0.1 K/UL (ref 0–0.2)
BASOPHILS RELATIVE PERCENT: 0.7 %
BUN BLDV-MCNC: 10 MG/DL (ref 7–20)
CALCIUM SERPL-MCNC: 9.1 MG/DL (ref 8.3–10.6)
CHLORIDE BLD-SCNC: 101 MMOL/L (ref 99–110)
CO2: 27 MMOL/L (ref 21–32)
CREAT SERPL-MCNC: 0.7 MG/DL (ref 0.6–1.2)
EOSINOPHILS ABSOLUTE: 0.2 K/UL (ref 0–0.6)
EOSINOPHILS RELATIVE PERCENT: 3 %
GFR AFRICAN AMERICAN: >60
GFR NON-AFRICAN AMERICAN: >60
GLUCOSE BLD-MCNC: 99 MG/DL (ref 70–99)
HCT VFR BLD CALC: 32.6 % (ref 36–48)
HEMOGLOBIN: 10.7 G/DL (ref 12–16)
LYMPHOCYTES ABSOLUTE: 2.6 K/UL (ref 1–5.1)
LYMPHOCYTES RELATIVE PERCENT: 31.1 %
MCH RBC QN AUTO: 34.1 PG (ref 26–34)
MCHC RBC AUTO-ENTMCNC: 32.9 G/DL (ref 31–36)
MCV RBC AUTO: 103.6 FL (ref 80–100)
MONOCYTES ABSOLUTE: 0.7 K/UL (ref 0–1.3)
MONOCYTES RELATIVE PERCENT: 7.9 %
NEUTROPHILS ABSOLUTE: 4.8 K/UL (ref 1.7–7.7)
NEUTROPHILS RELATIVE PERCENT: 57.3 %
PDW BLD-RTO: 13 % (ref 12.4–15.4)
PLATELET # BLD: 215 K/UL (ref 135–450)
PMV BLD AUTO: 10 FL (ref 5–10.5)
POTASSIUM REFLEX MAGNESIUM: 3.7 MMOL/L (ref 3.5–5.1)
RBC # BLD: 3.15 M/UL (ref 4–5.2)
SODIUM BLD-SCNC: 135 MMOL/L (ref 136–145)
WBC # BLD: 8.4 K/UL (ref 4–11)

## 2022-05-20 PROCEDURE — 92507 TX SP LANG VOICE COMM INDIV: CPT

## 2022-05-20 PROCEDURE — 97116 GAIT TRAINING THERAPY: CPT

## 2022-05-20 PROCEDURE — 97129 THER IVNTJ 1ST 15 MIN: CPT

## 2022-05-20 PROCEDURE — 97110 THERAPEUTIC EXERCISES: CPT

## 2022-05-20 PROCEDURE — 92526 ORAL FUNCTION THERAPY: CPT

## 2022-05-20 PROCEDURE — 36415 COLL VENOUS BLD VENIPUNCTURE: CPT

## 2022-05-20 PROCEDURE — 94669 MECHANICAL CHEST WALL OSCILL: CPT

## 2022-05-20 PROCEDURE — 80048 BASIC METABOLIC PNL TOTAL CA: CPT

## 2022-05-20 PROCEDURE — 6360000002 HC RX W HCPCS: Performed by: PHYSICAL MEDICINE & REHABILITATION

## 2022-05-20 PROCEDURE — 1280000000 HC REHAB R&B

## 2022-05-20 PROCEDURE — 97530 THERAPEUTIC ACTIVITIES: CPT

## 2022-05-20 PROCEDURE — 99231 SBSQ HOSP IP/OBS SF/LOW 25: CPT | Performed by: PHYSICAL MEDICINE & REHABILITATION

## 2022-05-20 PROCEDURE — 85025 COMPLETE CBC W/AUTO DIFF WBC: CPT

## 2022-05-20 PROCEDURE — 97112 NEUROMUSCULAR REEDUCATION: CPT

## 2022-05-20 PROCEDURE — 6370000000 HC RX 637 (ALT 250 FOR IP): Performed by: PHYSICAL MEDICINE & REHABILITATION

## 2022-05-20 RX ADMIN — Medication 1000 UNITS: at 10:53

## 2022-05-20 RX ADMIN — ASPIRIN 81 MG 81 MG: 81 TABLET ORAL at 10:52

## 2022-05-20 RX ADMIN — FUROSEMIDE 20 MG: 20 TABLET ORAL at 10:53

## 2022-05-20 RX ADMIN — MISOPROSTOL 100 MCG: 100 TABLET ORAL at 12:56

## 2022-05-20 RX ADMIN — Medication 150 MG: at 17:49

## 2022-05-20 RX ADMIN — ENOXAPARIN SODIUM 40 MG: 100 INJECTION SUBCUTANEOUS at 10:52

## 2022-05-20 RX ADMIN — SENNOSIDES AND DOCUSATE SODIUM 1 TABLET: 50; 8.6 TABLET ORAL at 20:51

## 2022-05-20 RX ADMIN — ATORVASTATIN CALCIUM 80 MG: 80 TABLET, FILM COATED ORAL at 20:51

## 2022-05-20 RX ADMIN — METOPROLOL TARTRATE 100 MG: 100 TABLET, FILM COATED ORAL at 10:52

## 2022-05-20 RX ADMIN — MEMANTINE HYDROCHLORIDE 5 MG: 5 TABLET, FILM COATED ORAL at 20:51

## 2022-05-20 RX ADMIN — MISOPROSTOL 100 MCG: 100 TABLET ORAL at 20:51

## 2022-05-20 RX ADMIN — METOPROLOL TARTRATE 100 MG: 100 TABLET, FILM COATED ORAL at 20:51

## 2022-05-20 RX ADMIN — MEMANTINE HYDROCHLORIDE 5 MG: 5 TABLET, FILM COATED ORAL at 12:57

## 2022-05-20 RX ADMIN — LISINOPRIL 20 MG: 20 TABLET ORAL at 10:53

## 2022-05-20 NOTE — PLAN OF CARE
Problem: Discharge Planning  Goal: Discharge to home or other facility with appropriate resources  Outcome: Progressing     Problem: Safety - Adult  Goal: Free from fall injury  Outcome: Progressing     Problem: Skin/Tissue Integrity  Goal: Absence of new skin breakdown  Description: 1. Monitor for areas of redness and/or skin breakdown  2. Assess vascular access sites hourly  3. Every 4-6 hours minimum:  Change oxygen saturation probe site  4. Every 4-6 hours:  If on nasal continuous positive airway pressure, respiratory therapy assess nares and determine need for appliance change or resting period.   Outcome: Progressing

## 2022-05-20 NOTE — CONSULTS
Comprehensive Nutrition Assessment    RECOMMENDATIONS:  1. PO Diet: Continue dysphagia soft and bite sized diet per SLP recs  2. ONS: Start Ensure Enlive BID     NUTRITION ASSESSMENT:   Nutritional summary & status: RD consulted to assess pt d/t new adm to ARU. Pt consuming fair po intakes but intakes are varied. RD adding ONS to help promote adequate nutrition intake. Noted pt on lasix and pt with 5 lb wt loss within past 2 days r/t diuretic tx and wt fluctuations expected throughout adm. WIll continue to monitor nutrition adequacy closely throughout adm.  Admission/PMH: Stroke; PMH: HTN, HLD, bipolar disorder, schizophrenia, current smoker    MALNUTRITION ASSESSMENT  Context of Malnutrition: Acute Illness   Malnutrition Status: At risk for malnutrition (Comment)  Findings of the 6 clinical characteristics of malnutrition (Minimum of 2 out of 6 clinical characteristics is required to make the diagnosis of moderate or severe Protein Calorie Malnutrition based on AND/ASPEN Guidelines):  Energy Intake: Mild decrease in energy intake (comment)   Energy Intake Time: 2 days     NUTRITION DIAGNOSIS   Inadequate oral intake related to inadequate protein-energy intake as evidenced by intake 0-25%,intake 26-50%,intake 51-75% (varied po intakes)    NUTRITION INTERVENTION  Food and/or Nutrient Delivery:  Continue Current Diet,Start Oral Nutrition Supplement  Nutrition Education/Counseling:  No recommendation at this time   Goals:  Pt will consume and tolerate >50% of all meals and ONS throughout adm. Nutrition Monitoring and Evaluation:   Food/Nutrient Intake Outcomes:  Food and Nutrient Intake,Supplement Intake  Physical Signs/Symptoms Outcomes:  Biochemical Data,GI Status,Weight     OBJECTIVE DATA: Significant to nutrition assessment  · Nutrition-Focused Physical Findings: lbm 5/18  · Labs: Reviewed;  Na 135  · Meds: Reviewed; senna, Vit D, lasix, dulcolax, prn glycolax   · Wounds: None       CURRENT NUTRITION THERAPIES  ADULT DIET; Dysphagia - Soft and Bite Sized     PO Intake: 1-25%,26-50%,51-75% (varied po intakes)   PO Supplement Intake:None Ordered  Additional Sources of Calories/IVF:n/a     ANTHROPOMETRICS  Current Height: 5' 4\" (162.6 cm)  Current Weight: 161 lb 9.6 oz (73.3 kg)    Admission weight: 161 lb 9.6 oz (73.3 kg)  Ideal Body Weight (IBW): 120 lbs  (55 kg)    Usual Bodyweight  (YAMILETH)   Weight Changes: wt fluctuations r/t diuretic tx       BMI: 27.8    Wt Readings from Last 50 Encounters:   05/18/22 161 lb 9.6 oz (73.3 kg)   05/16/22 166 lb 3.2 oz (75.4 kg)     COMPARATIVE STANDARDS  Energy (kcal):  8160-3071 (20-25)     Protein (g):  65-75 (1.2-1.4)       Fluid (ml/day):  >1500mL    The patient will still be monitored per nutrition standards of care. Consult dietitian if nutrition interventions essential to patient care is needed.      Ronny Crawley Manny [address], [address] [place] [address]  Jose E:  [phone]  Office:  [phone]

## 2022-05-20 NOTE — PROGRESS NOTES
Department of Physical Medicine & Rehabilitation  Progress Note    Patient Identification:  Hortencia Purdy  [id number]  : 1940  Admit date: 2022    Chief Complaint: Acute cerebrovascular accident (CVA) ([address]    Subjective:   No acute events overnight. Patient seen this am. Eating breakfast. No complaints. ROS: No f/c, n/v, cp     Objective:  Patient Vitals for the past 24 hrs:   BP Temp Temp src Pulse Resp SpO2   22 0759 -- -- -- -- 16 97 %   22 (!) 159/72 97.8 °F (36.6 °C) Oral 61 16 95 %   22 1657 -- -- -- -- 16 95 %   22 1145 -- -- -- -- 16 --   22 0952 -- 98.1 °F (36.7 °C) Oral 57 16 --   22 0943 127/70 -- -- -- -- --     Const: Alert. WDWN. No distress  Eyes: Conjunctiva noninjected, no icterus noted; pupils equal, round, and reactive to light. HENT: Atraumatic, normocephalic; Oral mucosa moist  Neck: Trachea midline, neck supple. No thyromegaly noted. CV: Regular rate and rhythm, no murmur rub or gallop noted  Resp: Lungs clear to auscultation bilaterally, no rales wheezes or ronchi, no retractions. Respirations unlabored. GI: Soft, nontender, nondistended. Normal bowel sounds. No palpable masses. Skin: Normal temperature and turgor. No rashes or breakdown noted. Ext: No significant edema appreciated. No varicosities. MSK: No joint tenderness, erythema, warmth noted. AROM intact. Neuro: RUE 4/5, RLE 4/5  -Mental status: Alert. Oriented to person, place, time, situation. 3 word immediate and delayed recall (sock, bed, blue) intact. Attention intact (months of year in reverse). -Language: Speech fluent, repetition and naming intact  -Cranial nerves: VFF, PERRL, EOMI, Facial sensation intact, Face symmetric, Hearing intact, Palate elevation symmetric, Shoulder shrug intact. Tongue midline.   -Sensation intact to light touch. -Motor examination reveals normal strength in all four limbs diffusely.   -No abnormalities with finger/nose noted. -Reflexes 2+ and symmetric. Negative Margot  Psych: Stable mood, normal judgement, normal affect     Laboratory data: Available via EMR.    Last 24 hour lab  Recent Results (from the past 24 hour(s))   Basic Metabolic Panel w/ Reflex to MG    Collection Time: 05/20/22  6:12 AM   Result Value Ref Range    Sodium 135 (L) 136 - 145 mmol/L    Potassium reflex Magnesium 3.7 3.5 - 5.1 mmol/L    Chloride 101 99 - 110 mmol/L    CO2 27 21 - 32 mmol/L    Anion Gap 7 3 - 16    Glucose 99 70 - 99 mg/dL    BUN 10 7 - 20 mg/dL    CREATININE 0.7 0.6 - 1.2 mg/dL    GFR Non-African American >60 >60    GFR African American >60 >60    Calcium 9.1 8.3 - 10.6 mg/dL   CBC auto differential    Collection Time: 05/20/22  6:12 AM   Result Value Ref Range    WBC 8.4 4.0 - 11.0 K/uL    RBC 3.15 (L) 4.00 - 5.20 M/uL    Hemoglobin 10.7 (L) 12.0 - 16.0 g/dL    Hematocrit 32.6 (L) 36.0 - 48.0 %    .6 (H) 80.0 - 100.0 fL    MCH 34.1 (H) 26.0 - 34.0 pg    MCHC 32.9 31.0 - 36.0 g/dL    RDW 13.0 12.4 - 15.4 %    Platelets 772 468 - 067 K/uL    MPV 10.0 5.0 - 10.5 fL    Neutrophils % 57.3 %    Lymphocytes % 31.1 %    Monocytes % 7.9 %    Eosinophils % 3.0 %    Basophils % 0.7 %    Neutrophils Absolute 4.8 1.7 - 7.7 K/uL    Lymphocytes Absolute 2.6 1.0 - 5.1 K/uL    Monocytes Absolute 0.7 0.0 - 1.3 K/uL    Eosinophils Absolute 0.2 0.0 - 0.6 K/uL    Basophils Absolute 0.1 0.0 - 0.2 K/uL       Therapy progress:  PT  Position Activity Restriction  Other position/activity restrictions: up with assistance  Objective     Sit to Stand: Contact guard assistance  Stand to sit: Contact guard assistance  Device: Rolling Walker  Assistance: Contact guard assistance,Minimal assistance (CGA for level and ramp ambulation with exception of 1 instance of min)  Distance: 10'x 2 , 150', 20' (ascent/descent of 10')  OT  PT Equipment Recommendations  Equipment Needed: No  Other: Pt owns RW/rollator/cane  Toilet - Technique: Ambulating  Equipment Used: Earline Amezcua bars  Assessment        SLP          Body mass index is 27.74 kg/m². Assessment and Plan:  1. 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 Dysphagia- ALP evaluation MBS failed in the past  3. Dysarthria- SLP evaluation   4. Right apical lung nodule- found on CT- 3 month follow up   5. Schizophrenia- continue home medications         Impairments- Decreased functional mobility, Decreased ADLs     Impairments: Decreased functional mobility, Decreased ADLs     Bladder - high risk retention - Monitor PVRs, SC prn >300cc     Bowel - high risk constipation - colace BID, PRN miralax and MoM. follow bowel movements. Enema or suppository if needed.      Safety - fall precautions     PPx  DVT: lovenox  GI: pantoprazole     FULL CODE     Maria Esther Mack MD PGY3    Rehab Progress: Improving  Anticipated Dispo: home  Services/DME: TBD  ELOS: TBD    This patient has been seen, examined, and discussed with the resident. This note has been altered to reflect my own examination findings, impression, and recommendations.      Pb Black D.O. M.P.H  PM&R  5/20/2022  9:24 AM

## 2022-05-20 NOTE — PROGRESS NOTES
Patient resting in bed. Patient assessment completed. VSS and patient is afebrile. Denies any pain or discomfort at this time. Bed in low position and call light within reach.

## 2022-05-20 NOTE — CARE COORDINATION
CM following. Pt is from home with family, they would like for her to return at DC from ARU but also discussed possible need for SNF before home. CM advised that CM follow along and coordinate placement if needed. Pt reported having needed DME at home, and being in agreement with Concepcion Leal at DC. CM will continue to follow for DC needs and recs.         Electronically signed by DAVID Nixon, LESLI on 5/20/2022 at 2:51 PM

## 2022-05-20 NOTE — PROGRESS NOTES
Occupational Therapy  Facility/Department: [facility] ACUTE REHAB UNIT  Rehabilitation Occupational Therapy Daily Treatment Note    Date: 22  Patient Name: Kathya Jose       Room: [facility]/2926-24  MRN: [id number]  Account: [de-identified]   : 1940  (77 y.o.) Gender: female                    Past Medical History:  has no past medical history on file. Past Surgical History:   has no past surgical history on file. Restrictions  Restrictions/Precautions: Seizure; Fall Risk  Other position/activity restrictions: up with assistance    Subjective  Subjective: Pt seated in recliner upon arrival, agreeable to therapy. Pt's son present. Pt denied pain. Restrictions/Precautions: Seizure; Fall Risk             Objective     Cognition  Overall Cognitive Status: Exceptions  Arousal/Alertness: Appropriate responses to stimuli  Following Commands: Follows multistep commands with increased time; Follows one step commands consistently  Attention Span: Difficulty dividing attention  Memory: Decreased short term memory  Safety Judgement: Decreased awareness of need for assistance;Decreased awareness of need for safety  Problem Solving: Assistance required to generate solutions  Insights: Decreased awareness of deficits  Initiation: Does not require cues  Sequencing: Requires cues for some  Orientation  Overall Orientation Status: Within Normal Limits  Orientation Level: Oriented X4         ADL             Functional Mobility  Device: Rolling walker  Activity: To/From therapy gym  Assistance Level: Contact guard assist  Skilled Clinical Factors: slow pace, decreased step height RLE  Sit to Supine  Assistance Level: Minimal assistance  Skilled Clinical Factors: on mat table, Min A to raise RLE onto mat  Supine to Sit  Assistance Level: Minimal assistance  Skilled Clinical Factors: on mat table, Min A for trunk ascension  Scooting  Assistance Level: Stand by assist  Skilled Clinical Factors: in short sitting along side of mat table  Transfers  Surface: To mat; To chair with arms;From chair with arms  Additional Factors: Hand placement cues  Device: Walker (RW)  Sit to Stand  Assistance Level: Contact guard assist  Stand to Sit  Assistance Level: Contact guard assist   OT Exercises  Exercise Treatment: Pt completed x20 R hand sponge squeeze w/ light resistance sponge. A/AROM Exercises: Pt completed the following RUE therex supine on mat table w/ use of air splint to isolate shoulder movements. Pt completed x10 reps AROM shoulder abduction in GE position achieving ~ degrees each rep and adducting back to neutral w/o assist. Pt required verbal encouragement for increased ROM and to avoid compensating reaching over w/ L hand to assist RUE. Pt completed x10 reps shoulder flexion AAROM (only able to raise RUE ~2 inches off mat w/o assist) with 10s hold beginning at 90 degrees shoulder flexion however slowly lowering RUE back toward mat d/t fatigue. Pt completed x10 reps AAROM horizontal adduction/abduction and was able to raise RUE ~6 in off mat table and required assist to move through full horizontal adduction, completed horizontal abduction back to mat table w/ VCs for improved control. Pt completed the following RUE exercises seated on edge of mat table. Pt completed x10 reps elbow flexion, achieving ~ active movement and required AAROM to move through full ROM. Pt completed x10 reps AROM pronation/supination w/ forearm supported on lap in neutral position achieving full ROM w/o assist. Pt completed x10 reps AAROM wrist flexion/extension w/ wrist supported on lap in neutral position achieving ~40 degrees in each direction. Neuromuscular Education  Gross Motor: Pt completed the following activity seated on edge of  mat table to address RUE gross motor coordination for ADLs and other functional tasks. Pt instructed to use RUE to reach for beanbags held out in various planes in front of pt.  Pt frequently attempting to grasp beanbags w/ LUE or use LUE to assist RUE w/ reaching beanbags. OT instructed pt to place LUE under thigh to reduce temptation to compensate w/ LUE. Pt utilized shoulder flexion, abduction, protraction, and elbow flexion to reach beanbags and was able to maintain weak grasp to place beanbag on mat table. Pt required VCs to extend and abduct thumb to better grasp beanbags. Fine Motor: Pt completed R hand digit opposition thumb to index finger and thumb to middle finger w/ decreased speed. Pt unable to complete opposition to ring or pinky finger. Assessment  Assessment  Assessment: Pt demo'd improved RUE ROM this date, completing up to 100 degrees shoulder abduction in GE position and 80 degrees shoulder flexion against-gravity during gross motor activity. Pt requires consistent VCs to avoid using LUE to assist RUE with all functional tasks. Pt CGA for functional transfers and ambulation w/ RW. Pt benefits from continued skilled OT to maximize independence and safety with functional tasks, cont OT per POC. Activity Tolerance: Patient limited by fatigue;Patient tolerated treatment well  Factors Affecting Discharge: Pt stated her family is interested in d/c to the same SNF her  is currently at, however pt would prefer to d/c home. OT Equipment Recommendations  Other: cont to assess  Safety Devices  Safety Devices in place: Yes  Type of devices: Left in chair;Chair alarm in place;Call light within reach    Patient Education  Education  Education Given To: Patient  Education Provided: Plan of Care;Role of Therapy;Home Exercise Program;Transfer Training; Safety  Education Provided Comments: RUE A/AROM exercises, sponge squeezes, stroke recovery  Education Method: Demonstration;Verbal  Barriers to Learning: None  Education Outcome: Verbalized understanding;Continued education needed    Plan  Plan  Times per Week: 5x/week, 60 mins/day  Times per Day: Daily  Current Treatment Recommendations: Strengthening;ROM;Balance training;Functional mobility training; Endurance training;Gait training;Cognitive reorientation; Neuromuscular re-education; Safety education & training;Self-Care / ADL; Patient/Caregiver education & training;Home management training    Goals  Patient Goals   Patient goals : \"to get through therapy\"  Short Term Goals  Time Frame for Short term goals: 2 Weeks  Short Term Goal 1: Pt will complete UE bathing and UE dressing i'ly - ongoing  Short Term Goal 2: Pt will complete LE bathing and LE dressing Mod I - ongoing  Short Term Goal 3: Pt will complete toileting, including toilet transfer, Mod I - ongoing  Short Term Goal 4: Pt will demonstrate independence w/ RUE HEP to increase strength for ADLs - ongoing  Short Term Goal 5: Pt will complete standing-level grooming > 5 mins Mod I - ongoing    Therapy Time   Individual Concurrent Group Co-treatment   Time In 0900         Time Out 1000         Minutes [address] [place] [address]

## 2022-05-20 NOTE — PROGRESS NOTES
Speech Language Pathology  ACUTE REHAB UNIT  SPEECH/LANGUAGE PATHOLOGY      [x] Daily  [] Weekly Care Conference Note  [] Discharge    Patient:Andria Sauceda      :1940  DBN:[id number]  Rehab Dx/Hx: Acute cerebrovascular accident (CVA) ([facility] [address] [I63.9]    Precautions: [] Aspiration  [x] Fall risk  [] Sternal  [] Seizure [] Hip  [] Weight Bearing [] Other     ST Dx: [] Aphasia  [x] Dysarthria  [] Apraxia   [x] Oropharyngeal dysphagia [x] Cognitive Impairment  [] Other:   Date of Admit: 2022  Room #: 3106/3106-01  Date: 2022          Current Diet Order:ADULT DIET; Dysphagia - Soft and Bite Sized   Recommended Form of Meds: Meds in puree  Compensatory Swallowing Strategies : Eat/Feed slowly,Upright as possible for all oral intake,Remain upright for 30-45 minutes after meals,Small bites/sips,Check for pocketing of food on the Right,Alternate solids and liquids,Set up assist,Lingual sweep        Subjective: Pt seated upright in chair agreeable to SLP evaluation at this time. 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  Leisure & Hobbies: crossword puzzles, reading, watching TV    Dentition: Dentures top,Dentures bottom  [address]: Impaired  Vision Exceptions: Wears glasses for reading  Hearing  Hearing: Exceptions to [facility]  Hearing Exceptions: Hard of hearing/hearing concerns (reports she needs to get hearing aids)  Barriers toward progress: Cognitive deficit        Date: 2022      Tx session 1 Tx session 2   Total Timed Code Min 10 0   Total Treatment Minutes 30 40   Individual Treatment Minutes 30 40   Group Treatment Minutes 0 0   Co-Treat Minutes 0 0   Brief Exception: N/A N/A   Pain None expressed.  Nausea   Pain Intervention: [] RN notified  [] Repositioned  [] Intervention offered and patient declined  [x] N/A  [] Other: [x] RN notified  [] Repositioned  [] Intervention offered and patient declined  [] N/A  [] Other:   Subjective:     Pt seated upright in chair agreeable to be seen during AM meal.  Pt up in chair, awake, alert, agreeable to session. Objective / Goals:       Goal 1: Pt will consume least restrictive diet with adequate oral clearance via use of strategies and no s/s penetration/aspiration. Soft and bite sized solids/thin liquids: no s/s penetration/aspiration, mod verbal cues for oral clearance with use of liquid wash and lingual sweep. Noted to take subsequent bites with mild-mod oral residuals on medial and posterior lingual surface. Some residue appeared not fully masticated. Slow mastication noted. Seen during lunch meal; required mod cues for oral clearance (alternating sips/bites, slow rate). Goal 2: The patient will improve oral preparation phase via bolus control/manipulation exercises to 5/5 each trial. Not directly targeted this session. Indirectly targeted     Goal 3: The patient will improve oral motor function via therapeutic oral motor exercises to 5/5 each trial. Not directly targeted this session. Indirectly targeted. The patient/caregiver will demonstrate understanding of compensatory strategies for improved swallowing safety. SLP discussed swallow strategies to implement prior to start of meal. Pt relayed x4 strategies immediately back to SLP. Benefited from mod verbal cues to implement throughout meal.  Reviewed swallow safety strategies. Required mod decreasing to min cues for implementation during meal.     Goal 1: Pt will complete problem solving tasks given min cues with 80% accuracy. Not directly targeted this session. Not targeted   Goal 2: Pt will utilize compensatory strategies for recall given min cues with 80% accuracy. Pt utilized white board with written therapy session times to state x4 sessions today. Pt asking what abbreviations stood for.  SLP wrote therapies on paper and provided to pt. Following 5 min delay pt recalled x2/3, increasing to x3/3 given min verbal cue. Did not directly target. Goal 3: Pt will sustain attention for task completion given no more than x3 redirections/repetitions of instructions. Adequate attention to recall task. x3 repetitions to implement instructions during PO intake, however adequate carry over once cued. Patient required min-mod cues for sustained attention to task. Goal 4: Pt will utilize speech strategies in basic conversational tasks for increased intelligibility of 90%   Not directly targeted this session. Reviewed speech clarity strategies (slow rate, loud, over-articulation, breath support). Patient required intermittent mod cues for loudness and articulation at conversation level. Other areas targeted:     Education:   Educated re: swallow strategies, recall strategies, reasons for tx. Educated to purpose of visit, speech and swallow strategies   Safety Devices: [x] Call light within reach  [x] Chair alarm activated and connected to nurse call light system  [] Bed alarm activated   [] Other: [] Call light within reach  [] Chair alarm activated and connected to nurse call light system  [] Bed alarm activated  [] Other:    Assessment: Pt presents with moderate cognitive impairments with memory, attention, problem solving. Adequate use of reading/repetition for recall this AM. Mild dysarthria with noted increased slurred speech with longer utterances. Mild oral dysphagia characterized by increased/prolonged mastication, oral residuals and cues required to implement strategies. Plan: Continue as per plan of care.       Interventions used this date:  [x] Speech/Language Treatment  [] Instruction in HEP  [x] Dysphagia Treatment [x] Cognitive Treatment   [] Other:    Discharge recommendations:  [] Home independently  [x] Home with assistance []  24 hour supervision  [] ECF [] Other  Continued Tx Upon Discharge: ? [] Yes    [] No [x] TBD based on progress while on ARU     [] Vital Stim indicated     [] Other:   Estimated discharge date: Not yet established       Electronically signed by:  1st session:  Cate Antoine M.A., [address] [facility]     2nd session:  Chris, [id number]  Pg.  # [phone]

## 2022-05-20 NOTE — PROGRESS NOTES
Physical Therapy  Facility/Department: [facility] ACUTE REHAB UNIT  Rehabilitation Physical Therapy Daily Treatment Note     NAME: Radha Freeman  : 1940 (21 y.o.)  MRN: [id number]  CODE STATUS: Full Code    Date of Service: 22  Chart Reviewed: Yes  Patient assessed for rehabilitation services?: Yes  Additional Pertinent Hx: Pt is a 81 yo female admitted 22 for CVA. 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 Restrictions:  Restrictions/Precautions: Seizure; Fall Risk  Position Activity Restriction  Other position/activity restrictions: up with assistance     SUBJECTIVE  Subjective: Pt seated in recliner upon approach and agreeable to PT. Denies Pain. OBJECTIVE      Functional Mobility  Transfers  Sit to Stand: Contact guard assistance  Stand to sit: Contact guard assistance  Comment: completed at recliner/chair with RW, VC/ tactile cues for hand placement all transfers  Balance  Posture: Fair (kyphosis)  Standing - Static: Fair (CGA without RW)  Standing - Dynamic: Fair;- (CGA/SBA for ambulation with RW)  Comments: Pt completes reaching outside base of support and crossbody for cones for 3 x ~ 1 min with shoulder width STELLA first round (CGA) , NBOS second round (CGA), and airex third round (CGA-min with frequent UE righting reactions). ~ 30 seconds completed on airex without UE support and CGA-min.  .    Environmental Mobility  Ambulation  Surface: level tile  Device: Rolling Walker  Assistance: Contact guard assistance;Stand by assistance (CGA progressing to SBA)  Quality of Gait: decreased B LE step height/length with decreased heel strike, path deviation with visual cues required to correct, forward flexed posture, downward gaze  Gait Deviations: Deviated path  Distance: 400', small distances in gym, 60'  Comments: VC for forward gaze/upright posture and increased step height/length. Given goal to complete 25' increments with as few steps as possible- 33 steps to complete first round progressing to 27 steps throughout activity. Stairs/Curb  Stairs?: Yes  Stairs  # Steps : 3  Stairs Height: 6\"  Rails: Bilateral  Assistance: Contact guard assistance;Minimal assistance (occasional min for R UE management only)  Comment: step to pattern, VC for sequencing; 1 instance of incorrect sequencing on descent (leads with L LE) leading to decreased eccentric control however CGA for balance throughout    PT Exercises  Exercise Treatment: 2 x 5 B LE standing marches with B UE support on handrail. VC for upright posture/technique. Improved AROM R LE noted second round and improved step height during ambulation back to room post activity. ASSESSMENT       Activity Tolerance  Activity Tolerance: Patient limited by fatigue;Patient tolerated treatment well;Patient tolerated evaluation without incident  Activity Tolerance Comments: Pt with increased fatigue throughout session requiring extended seated rest.    Assessment  Assessment: Pt demonstrates increased endurance through increased ambulation distance and increased number of stairs completed. Pt demo's progress in balance through completing ambulation with RW with CGA/SBA, completing stairs with CGA for balance, and progressing in dynamic standing balance on uneven surfaces throughout session. Pt would benefit from continued skilled PT in order toprogress towards PLOF and independence.   Treatment Diagnosis: decreased functional mobility  Therapy Prognosis: Good  Decision Making: Medium Complexity  Barriers to Learning: decreased short term memory  Discharge Recommendations: 24 hour supervision or assist;Home with Home health PT  PT D/C Equipment  Other: Pt owns RW/rollator/cane  PT Equipment Recommendations  Equipment Needed: No  Other: Pt owns RW/rollator/cane    CLINICAL IMPRESSION   Pt demonstrates increased endurance through increased ambulation distance and increased number of stairs completed. Pt demo's progress in balance through completing ambulation with RW with CGA/SBA, completing stairs with CGA for balance, and progressing in dynamic standing balance on uneven surfaces throughout session. Pt would benefit from continued skilled PT in order toprogress towards PLOF and independence. GOALS  Patient Goals   Patient goals : to return home  Long Term Goals  Time Frame for Long term goals : 2 weeks- all ongoing  Long term goal 1: Pt will complete bed mobility independently  Long term goal 2: Pt will complete sit<>stand transfers with mod I and LRAD  Long term goal 3: Pt will complete 150' ambulation with mod I and LRAD  Long term goal 4: Pt will complete 5 steps with B handrails and supervision  Long term goal 5: Pt will complete car transfer with mod I and LRAD    PLAN OF CARE  Frequency: 1-2 treatment sessions per day, 5-7 days per week  Plan  Plan:  (60 min, 5 days a week)  Current Treatment Recommendations: Strengthening;ROM;Balance training;Functional mobility training;Transfer training; Endurance training;Gait training;Stair training  Safety Devices  Type of Devices: Call light within reach; Bed alarm in place; Left in bed;Nurse notified    Therapy Time   Individual Concurrent Group Co-treatment   Time In 1045         Time Out 1145         Minutes 60           Timed Code Treatment Minutes: [address] [facility]

## 2022-05-20 NOTE — PLAN OF CARE
Problem: Safety - Adult  Goal: Free from fall injury  Note: Remains free from falls. Call light within reach and alarms in use at all times. Calls appropriately for assist. X1 with walker supervision.

## 2022-05-21 PROCEDURE — 97530 THERAPEUTIC ACTIVITIES: CPT

## 2022-05-21 PROCEDURE — 97130 THER IVNTJ EA ADDL 15 MIN: CPT

## 2022-05-21 PROCEDURE — 97110 THERAPEUTIC EXERCISES: CPT

## 2022-05-21 PROCEDURE — 97129 THER IVNTJ 1ST 15 MIN: CPT

## 2022-05-21 PROCEDURE — 94761 N-INVAS EAR/PLS OXIMETRY MLT: CPT

## 2022-05-21 PROCEDURE — 92507 TX SP LANG VOICE COMM INDIV: CPT

## 2022-05-21 PROCEDURE — 97116 GAIT TRAINING THERAPY: CPT

## 2022-05-21 PROCEDURE — 6370000000 HC RX 637 (ALT 250 FOR IP): Performed by: PHYSICAL MEDICINE & REHABILITATION

## 2022-05-21 PROCEDURE — 1280000000 HC REHAB R&B

## 2022-05-21 PROCEDURE — 99231 SBSQ HOSP IP/OBS SF/LOW 25: CPT | Performed by: PHYSICAL MEDICINE & REHABILITATION

## 2022-05-21 PROCEDURE — 6360000002 HC RX W HCPCS: Performed by: PHYSICAL MEDICINE & REHABILITATION

## 2022-05-21 PROCEDURE — 94669 MECHANICAL CHEST WALL OSCILL: CPT

## 2022-05-21 PROCEDURE — 97535 SELF CARE MNGMENT TRAINING: CPT

## 2022-05-21 PROCEDURE — 92526 ORAL FUNCTION THERAPY: CPT

## 2022-05-21 RX ORDER — HYDRALAZINE HYDROCHLORIDE 25 MG/1
25 TABLET, FILM COATED ORAL 3 TIMES DAILY PRN
Status: DISCONTINUED | OUTPATIENT
Start: 2022-05-21 | End: 2022-05-21

## 2022-05-21 RX ORDER — HYDRALAZINE HYDROCHLORIDE 10 MG/1
10 TABLET, FILM COATED ORAL 3 TIMES DAILY PRN
Status: DISCONTINUED | OUTPATIENT
Start: 2022-05-21 | End: 2022-05-27 | Stop reason: HOSPADM

## 2022-05-21 RX ADMIN — METOPROLOL TARTRATE 100 MG: 100 TABLET, FILM COATED ORAL at 20:40

## 2022-05-21 RX ADMIN — MISOPROSTOL 100 MCG: 100 TABLET ORAL at 09:19

## 2022-05-21 RX ADMIN — MEMANTINE HYDROCHLORIDE 5 MG: 5 TABLET, FILM COATED ORAL at 09:19

## 2022-05-21 RX ADMIN — ATORVASTATIN CALCIUM 80 MG: 80 TABLET, FILM COATED ORAL at 20:40

## 2022-05-21 RX ADMIN — Medication 1000 UNITS: at 09:18

## 2022-05-21 RX ADMIN — SENNOSIDES AND DOCUSATE SODIUM 1 TABLET: 50; 8.6 TABLET ORAL at 20:40

## 2022-05-21 RX ADMIN — ENOXAPARIN SODIUM 40 MG: 100 INJECTION SUBCUTANEOUS at 09:19

## 2022-05-21 RX ADMIN — ASPIRIN 81 MG 81 MG: 81 TABLET ORAL at 09:17

## 2022-05-21 RX ADMIN — FUROSEMIDE 20 MG: 20 TABLET ORAL at 09:19

## 2022-05-21 RX ADMIN — MISOPROSTOL 100 MCG: 100 TABLET ORAL at 20:59

## 2022-05-21 RX ADMIN — MEMANTINE HYDROCHLORIDE 5 MG: 5 TABLET, FILM COATED ORAL at 21:35

## 2022-05-21 NOTE — PLAN OF CARE
Problem: Safety - Adult  Goal: Free from fall injury  5/21/2022 0021 by Jef Skaggs RN  Note: Remains free from falls. Call light within reach and alarms in use at all times.  Calls appropriately for assist.

## 2022-05-21 NOTE — PROGRESS NOTES
Physical Therapy  Facility/Department: [facility] ACUTE REHAB UNIT  Rehabilitation Physical Therapy Treatment Note     NAME: Maggie Lopez  : 1940 (39 y.o.)  MRN: [id number]  CODE STATUS: Full Code    Date of Service: 22  Chart Reviewed: Yes  Patient assessed for rehabilitation services?: Yes  Additional Pertinent Hx: Pt is a 79 yo female admitted 22 for CVA. 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 Restrictions:  Restrictions/Precautions: Seizure; Fall Risk  Position Activity Restriction  Other position/activity restrictions: up with assistance     SUBJECTIVE  Subjective: Pt supine in bed upon approach and agreeable to PT  OBJECTIVE   Functional Mobility  Bed mobility  Supine to Sit: Minimal assistance; Moderate assistance (min A with HOB elevated and use of handrail, mod A on mat in gym; assist provided for trunk)  Sit to Supine: Contact guard assistance;Minimal assistance (CGA on mat in gym, min A for R LE into bed with HOB flat and use of handrail (fatigue present, completed at end of session))  Scooting: Contact guard assistance;Minimal assistance (Min A at beginning of session seated EOB with VC/ tactile cues requires for sequencing, able to complete with CGA on mat later in session)  Bed Mobility Comments: step by step VC for sequencing of log roll  Transfers  Sit to Stand: Contact guard assistance  Stand to sit: Contact guard assistance  Comment: completed at [employer] with RW, VC/ tactile cues for hand placement all transfers  Balance  Posture: Fair (kyphosis)  Sitting - Static: Fair;- (min A with posterior lean when originally seated EOB progressing to SBA , SBA for remainder of seated balance on commode/mat)  Sitting - Dynamic: Fair;- (CGA for seated scooting EOB and weight shifting on commode with UE support)  Standing - Static: Fair (CGA without RW, SBA with RW)  Standing - Dynamic: Fair;- (SBA for ambulation with RW)  Comments: SBA for seated balance on commode without UE support, CGA for brief/pants management, denture care, and handwashing in standing without UE support    Environmental Mobility  Ambulation  Surface: level tile  Device: Rolling Walker  Assistance: Stand by assistance  Quality of Gait: decreased B LE step height/length with decreased heel strike, decreased R LE clearance - improvement in response to VC path deviation with visual cues required to correct, forward flexed posture, downward gaze- VC for obstacle avoidance  Gait Deviations: Deviated path; Slow Kaelyn;Decreased step height;Decreased step length  Distance: 15', 60' x 2, small distances in gym  Comments: VC for forward gaze/upright posture, obstacle avoidance, and increased step height/length. Stairs/Curb  Stairs?: Yes  Stairs  # Steps : 6  Stairs Height: 6\"  Rails: Bilateral  Device: No Device  Assistance: Contact guard assistance  Comment: step to pattern, slow and effortful, min VC for sequencing and for increased R foot clearance on ascent    PT Exercises  Exercise Treatment: 2 x 10 R LE heel slides, SAQ, and supine hip abduction . 2 x 10 bridges. SLR attempted R LE however pt unable to complete. reports R LE weaker at baseline d/t PMH of R hip surgery. VC for technique. Printout provided with exercises reviewed. ASSESSMENT       Activity Tolerance  Activity Tolerance: Patient limited by fatigue;Patient tolerated treatment well  Activity Tolerance Comments: Pt with increased fatigue throughout session requiring extended rest breaks.     Assessment  Assessment: Pt demonstrates improved ability to sustain increased R LE clearance in response to VC this date. Nick's progress in strength during supine HEP. Continues to require SBA-CGA for transfers and amabulation. Would benefit from continued skilled PT in order to progress functional mobility and independence. Treatment Diagnosis: decreased functional mobility  Therapy Prognosis: Good  Decision Making: Medium Complexity  Barriers to Learning: decreased short term memory  Discharge Recommendations: 24 hour supervision or assist;Home with Home health PT  PT D/C Equipment  Other: Pt owns RW/rollator/cane  PT Equipment Recommendations  Equipment Needed: No  Other: Pt owns RW/rollator/cane    CLINICAL IMPRESSION   Pt demonstrates improved ability to sustain increased R LE clearance in response to VC this date. Nick's progress in strength during supine HEP. Continues to require SBA-CGA for transfers and amabulation. Would benefit from continued skilled PT in order to progress functional mobility and independence. GOALS  Long Term Goals  Time Frame for Long term goals : 2 weeks- all ongoing  Long term goal 1: Pt will complete bed mobility with mod I (pt owns hospital bed)  Long term goal 2: Pt will complete sit<>stand transfers with mod I and LRAD  Long term goal 3: Pt will complete 150' ambulation with mod I and LRAD  Long term goal 4: Pt will complete 5 steps with B handrails and supervision  Long term goal 5: Pt will complete car transfer with mod I and LRAD    PLAN OF CARE  Frequency: 1-2 treatment sessions per day, 5-7 days per week  Plan  Plan:  (60 min, 5 days a week)  Current Treatment Recommendations: Strengthening;ROM;Balance training;Functional mobility training;Transfer training; Endurance training;Gait training;Stair training  Safety Devices  Type of Devices: Call light within reach; Bed alarm in place; Left in bed;Nurse notified      Therapy Time   Individual Concurrent Group Co-treatment   Time In  1200         Time Out  1300         Minutes  60 Total: 60 min         [address] [id number]

## 2022-05-21 NOTE — PROGRESS NOTES
Speech Language Pathology  ACUTE REHAB UNIT  SPEECH/LANGUAGE PATHOLOGY      [x] Daily  [] Weekly Care Conference Note  [] Discharge    Patient:Andria Pulido      :1940  RAP:[id number]  Rehab Dx/Hx: Acute cerebrovascular accident (CVA) ([facility] [address] [I63.9]    Precautions: [] Aspiration  [x] Fall risk  [] Sternal  [] Seizure [] Hip  [] Weight Bearing [] Other     ST Dx: [] Aphasia  [x] Dysarthria  [] Apraxia   [x] Oropharyngeal dysphagia [x] Cognitive Impairment  [] Other:   Date of Admit: 2022  Room #: 3106/3106-01  Date: 2022          Current Diet Order:ADULT DIET; Dysphagia - Soft and Bite Sized  ADULT ORAL NUTRITION SUPPLEMENT; Breakfast, Dinner; Standard High Calorie/High Protein Oral Supplement   Recommended Form of Meds: Meds in puree  Compensatory Swallowing Strategies : Eat/Feed slowly,Upright as possible for all oral intake,Remain upright for 30-45 minutes after meals,Small bites/sips,Check for pocketing of food on the Right,Alternate solids and liquids,Set up assist,Lingual sweep        Subjective: Pt seated upright in chair agreeable to SLP evaluation at this time. 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  Leisure & Hobbies: crossword puzzles, reading, watching TV    Dentition: Dentures top,Dentures bottom  [address]: Impaired  Vision Exceptions: Wears glasses for reading  Hearing  Hearing: Exceptions to [facility]  Hearing Exceptions: Hard of hearing/hearing concerns (reports she needs to get hearing aids)  Barriers toward progress: Cognitive deficit        Date: 2022     Tx session 1   Total Timed Code Min 30   Total Treatment Minutes 60   Individual Treatment Minutes 60   Group Treatment Minutes 0   Co-Treat Minutes 0   Brief Exception: N/A   Pain None indicated at this time.    Pain Intervention: [] RN notified  [] Repositioned  [] Intervention offered and patient declined  [x] N/A  [] Other:   Subjective:     Pt was visited sitting upright in bedside chair with breakfast tray. She was agreeable to therapy this date. Objective / Goals:      Goal 1: Pt will consume least restrictive diet with adequate oral clearance via use of strategies and no s/s penetration/aspiration. Given soft and bite sized solids, the pt was observed to take multiple bites before attempting mastication. She required prompting throughout to adequately clear oral cavity before taking another bite. R sided pocketing noted but cleared given cue for lingual sweep and liquid flush. At times pt would get mildly frustrated by SLP's verbal prompts to not take another bite. No overt s/s of aspiration noted this session. Goal 2: The patient will improve oral preparation phase via bolus control/manipulation exercises to 5/5 each trial. Not directly targeted this session. Goal 3: The patient will improve oral motor function via therapeutic oral motor exercises to 5/5 each trial. Not directly targeted this session. The patient/caregiver will demonstrate understanding of compensatory strategies for improved swallowing safety. Pt was able to independently verbalize ashly swallow compensatory strategies however she required prompting throughout meal to implement them. Goal 1: Pt will complete problem solving tasks given min cues with 80% accuracy. Not directly targeted this session. Goal 2: Pt will utilize compensatory strategies for recall given min cues with 80% accuracy. Pt was re-educated to compensatory recall strategies but had difficulty using the strategy of association when prompted by the SLP. Goal 3: Pt will sustain attention for task completion given no more than x3 redirections/repetitions of instructions.    Given a reading task, pt was able to sustained attention to follow given directions through completion of the activity. Following the completion of her meal, the SLP asked \"Are you finished eating? \" to which the pt endorsed that she was but would continue to eat approximately 30 seconds later. Goal 4: Pt will utilize speech strategies in basic conversational tasks for increased intelligibility of 90%   Pt was difficulty to understand during reading task and required prompting to utilize strategies for increase speech intelligibility. She appeared receptive to the strategies and was able to verbalize them after approximately 5 min delay. Other areas targeted: n/a   Education:   Pt was re-educated to safe swallow compensatory strategies, recall strategies, and speech intelligibility strategies. Safety Devices: [x] Call light within reach  [x] Chair alarm activated and connected to nurse call light system  [] Bed alarm activated   [] Other:   Assessment:  The pt continues to present with a mild-mod cognitive linguistic impairment characterize by decreased attention, decreased recall, and increased impulsivity. Decreased speech intelligibility continues to be present. Increased mastication time, delayed AP transit, and R sided pocketing noted this date. Plan: Continue as per POC.      Interventions used this date:  [x] Speech/Language Treatment  [] Instruction in HEP  [x] Dysphagia Treatment [x] Cognitive Treatment   [] Other:    Discharge recommendations:  [] Home independently  [x] Home with assistance []  24 hour supervision  [] ECF [] Other  Continued Tx Upon Discharge: ? [] Yes    [] No    [x] TBD based on progress while on ARU     [] Vital Stim indicated     [] Other:   Estimated discharge date: Not yet established       Electronically signed by:  Aj Villanueva, [address]; [id number]  Speech-Language Pathologist

## 2022-05-21 NOTE — PROGRESS NOTES
Patient sitting up in chair. Denies any pain or discomfort at this time. Assessment completed and VSS. Patient remains afebrile. Bed in low position and call light within reach.

## 2022-05-21 NOTE — PLAN OF CARE
Problem: Safety - Adult  Goal: Free from fall injury  5/21/2022 1300 by Pamela Saldaña  Outcome: Progressing  Note: Patient remains free of falls this shift. Room free of clutter, bed in low position and call light within reach     Problem: ABCDS Injury Assessment  Goal: Absence of physical injury  Outcome: Progressing  Note: Patient remains free of physical injury this shift. Bed/shaista alarm in use     Problem: Skin/Tissue Integrity  Goal: Absence of new skin breakdown  Description: 1. Monitor for areas of redness and/or skin breakdown  2. Assess vascular access sites hourly  3. Every 4-6 hours minimum:  Change oxygen saturation probe site  4. Every 4-6 hours:  If on nasal continuous positive airway pressure, respiratory therapy assess nares and determine need for appliance change or resting period. Outcome: Progressing  Note: No new skin breakdown noted this shift.  Will continue to monitor

## 2022-05-21 NOTE — PROGRESS NOTES
Department of Physical Medicine & Rehabilitation  Progress Note    Patient Identification:  Marycruz Bullock  [id number]  : 1940  Admit date: 2022    Chief Complaint: Acute cerebrovascular accident (CVA) ([address]    Subjective:   Doing well. She is participating in bed exercises today. No new complaint overnight. No pain today. Participating well in therapy. Seen in her room this morning. ROS: No f/c, n/v, cp     Objective:  Patient Vitals for the past 24 hrs:   BP Temp Temp src Pulse Resp SpO2   22 0935 -- -- -- -- -- 97 %   22 0745 (!) 136/53 98.1 °F (36.7 °C) Oral 57 16 94 %   22 2030 (!) 154/63 98.3 °F (36.8 °C) Oral 61 16 93 %   22 1052 (!) 148/79 98 °F (36.7 °C) Oral 66 16 95 %     Const: Alert. WDWN. No distress  Eyes: Conjunctiva noninjected, no icterus noted; pupils equal, round, and reactive to light. HENT: Atraumatic, normocephalic; Oral mucosa moist  Neck: Trachea midline, neck supple. No thyromegaly noted. CV: Regular rate and rhythm, no murmur rub or gallop noted  Resp: Lungs clear to auscultation bilaterally, no rales wheezes or ronchi, no retractions. Respirations unlabored. GI: Soft, nontender, nondistended. Normal bowel sounds. No palpable masses. Skin: Normal temperature and turgor. No rashes or breakdown noted. Ext: No significant edema appreciated. No varicosities. MSK: No joint tenderness, erythema, warmth noted. AROM intact. Neuro: RUE 4/5, RLE 4/5  -Mental status: Alert. Oriented to person, place, time, situation. 3 word immediate and delayed recall (sock, bed, blue) intact. Attention intact (months of year in reverse). -Language: Speech fluent, repetition and naming intact  -Cranial nerves: VFF, PERRL, EOMI, Facial sensation intact, Face symmetric, Hearing intact, Palate elevation symmetric, Shoulder shrug intact. Tongue midline.   -Sensation intact to light touch. -Motor examination reveals normal strength in all four limbs diffusely.

## 2022-05-21 NOTE — PROGRESS NOTES
Occupational Therapy  Facility/Department: [facility]  Rehabilitation Occupational Therapy Daily Treatment Note    Date: 22  Patient Name: Marycruz Bullock       Room: [facility]/4079-13  MRN: [id number]  Account: [de-identified]   : 1940  (77 y.o.) Gender: female                    Past Medical History:  has no past medical history on file. Past Surgical History:   has no past surgical history on file. Restrictions  Restrictions/Precautions: Seizure; Fall Risk  Other position/activity restrictions: up with assistance    Subjective  Subjective: Pt seated in bed upon arrival finishing lunch. Pt agreeable to therapy however stating \"I'm so tired today, can we stay in here? \"  Restrictions/Precautions: Seizure; Fall Risk             Objective     Cognition  Overall Cognitive Status: Exceptions  Arousal/Alertness: Appropriate responses to stimuli  Following Commands: Follows multistep commands with increased time; Follows one step commands consistently  Attention Span: Difficulty dividing attention  Memory: Decreased short term memory  Safety Judgement: Decreased awareness of need for assistance;Decreased awareness of need for safety  Problem Solving: Assistance required to generate solutions  Insights: Decreased awareness of deficits  Initiation: Does not require cues  Sequencing: Requires cues for some  Cognition Comment: Slightly delayed processing  Orientation  Overall Orientation Status: Within Normal Limits         ADL  Grooming/Oral Hygiene  Assistance Level: Contact guard assist  Skilled Clinical Factors: Pt completed hand hygiene in stance at sink using self-HOHA to wash R hand  Toileting  Assistance Level: Minimal assistance  Skilled Clinical Factors: Pt continent of urine, completed pericare in stance w/ CGA and pants mgmt in stance w/ Min A to pull up fully on R side  Toilet Transfers  Technique:  (ambulating w/ RW)  Equipment: Raised toilet seat with arms  Assistance Level: Contact guard assist  Skilled Clinical Factors: VCs for hand placement          Functional Mobility  Device: Rolling walker  Activity: To/From bathroom  Assistance Level: Contact guard assist  Skilled Clinical Factors: slow pace, decreased step height RLE  Supine to Sit  Assistance Level: Contact guard assist  Skilled Clinical Factors: HOB slightly elevated, increased time to complete  Scooting  Assistance Level: Stand by assist  Skilled Clinical Factors: Scooting to EOB  Transfers  Surface: From bed; To chair with arms  Additional Factors: Hand placement cues  Device: Walker (RW)  Sit to Stand  Assistance Level: Contact guard assist  Skilled Clinical Factors: VCs for hand placement, to push up from surface rather than pull on RW, VCs to place R hand on walker arm w/o using L hand to assist  Stand to Sit  Assistance Level: Contact guard assist  Skilled Clinical Factors: VCs to reach back for surface   OT Exercises  Exercise Treatment: Pt completed the following seated therex to increase RUE strength for ADLs and functional transfers. Pt completed 2 sets x 5 reps of each w/ 1# dowel deniz: elbow flexion, shoulder flexion, shoulder protraction, and shoulder horizontal ab/adduction. Pt required VCs to keep dowel deniz even (slanted down R side d/t RUE weakness). Pt was able to move through nearly full ROM with each exercise, required increased time to complete, took extended rest breaks between each set d/t fatigue. Static Standing Balance Exercises: Pt completed static standing approx. 3 mins + 2 mins while completing gross motor coordination activity w/ CGA-SBA w/o UE support. Gross Motor  Pt completed the following activities to address GM coordination for improved ADL performance. 1) Seated in recliner, pt utilized RUE to reach out and grasp 4 cones placed on tabletop and stack each cone on top of a target. Pt required tactile cue from OT to refrain from using LUE to assist RUE.  Pt required increased time to grasp each cone w/ difficulty abducting thumb to wrap around cone. Pt demo'd fair grasp and was able to stack all 4 cones w/o dropping. When removing each cone from the stack w/ RUE, pt dropped the first two cones but demo'd improved performance when cued by OT to ensure thumb wrapped fully around cone prior to lifting off stack. 2) Standing w/ CGA-SBA w/o UE support, pt reached out to grasp one sock w/ each UE, folded socks together using both hands, and dropped folded socks into a basket using R hand. Pt required increased time to fold socks together and verbal reminder to use R hand to drop socks into basket. Fine Motor  Pt completed the following activity seated in recliner to address FM coordination for improved ADL performance. Pt utilized RUE to  8 chunky cylindrical beads using three jaw geovani grasp and place on string held in L hand. Pt required increased time to grasp each bead w/ difficulty positioning thumb to secure bead before lifting off tabletop resulting in 50% of beads dropping back onto tabletop. Pt required frequent VCs + TCs to avoid using L hand to assist R hand. Assessment  Assessment  Assessment: Pt demo'd good performance of RUE gross motor coordination and bimanual activities stacking cones and folding socks, however presented w/ some difficulty extending/abducting thumb to maintain grasp on objects. Pt continues to over-compensate w/ using L hand to assist R hand w/ functional tasks and requires frequent verbal/tactile cues and education on allowing R hand to complete or at least attempt to perform tasks first prior to assisting w/ L hand. Pt benefits from continued skilled OT to maximize independence and safety with functional tasks, cont OT per POC.   Activity Tolerance: Patient limited by fatigue;Patient tolerated treatment well  Discharge Recommendations: 24 hour supervision or assist;Home with Home health OT  Factors Affecting Discharge: Pt stated her family is interested in d/c to the same SNF her  is currently at, however pt would prefer to d/c home. OT Equipment Recommendations  Other: cont to assess  Safety Devices  Safety Devices in place: Yes  Type of devices: Left in bed;Call light within reach; Bed alarm in place    Patient Education  Education  Education Given To: Patient  Education Provided: Plan of Care;Role of Therapy;Transfer Training; Safety;ADL Function  Education Provided Comments: AROM exercises, allowing R hand to perform tasks w/o using L hand to assist, safety with transfers  Education Method: Demonstration;Verbal  Barriers to Learning: Cognition  Education Outcome: Verbalized understanding;Continued education needed    Plan  Plan  Times per Week: 5x/week, 60 mins/day  Times per Day: Daily  Current Treatment Recommendations: Strengthening;ROM;Balance training;Functional mobility training; Endurance training;Gait training;Cognitive reorientation; Neuromuscular re-education; Safety education & training;Self-Care / ADL; Patient/Caregiver education & training;Home management training    Goals  Patient Goals   Patient goals : \"to get through therapy\"  Short Term Goals  Time Frame for Short term goals: 2 Weeks  Short Term Goal 1: Pt will complete UE bathing and UE dressing i'ly - ongoing  Short Term Goal 2: Pt will complete LE bathing and LE dressing Mod I - ongoing  Short Term Goal 3: Pt will complete toileting, including toilet transfer, Mod I - ongoing  Short Term Goal 4: Pt will demonstrate independence w/ RUE HEP to increase strength for ADLs - ongoing  Short Term Goal 5: Pt will complete standing-level grooming > 5 mins Mod I - ongoing    Therapy Time   Individual Concurrent Group Co-treatment   Time In 1330         Time Out 1430         Minutes [address] [place] [address]

## 2022-05-22 PROCEDURE — 6370000000 HC RX 637 (ALT 250 FOR IP): Performed by: PHYSICAL MEDICINE & REHABILITATION

## 2022-05-22 PROCEDURE — 6360000002 HC RX W HCPCS: Performed by: PHYSICAL MEDICINE & REHABILITATION

## 2022-05-22 PROCEDURE — 99232 SBSQ HOSP IP/OBS MODERATE 35: CPT | Performed by: PHYSICAL MEDICINE & REHABILITATION

## 2022-05-22 PROCEDURE — 1280000000 HC REHAB R&B

## 2022-05-22 PROCEDURE — 94669 MECHANICAL CHEST WALL OSCILL: CPT

## 2022-05-22 RX ADMIN — MISOPROSTOL 100 MCG: 100 TABLET ORAL at 20:27

## 2022-05-22 RX ADMIN — ENOXAPARIN SODIUM 40 MG: 100 INJECTION SUBCUTANEOUS at 08:59

## 2022-05-22 RX ADMIN — PROMETHAZINE HYDROCHLORIDE 12.5 MG: 25 TABLET ORAL at 14:54

## 2022-05-22 RX ADMIN — METOPROLOL TARTRATE 100 MG: 100 TABLET, FILM COATED ORAL at 20:22

## 2022-05-22 RX ADMIN — ASPIRIN 81 MG 81 MG: 81 TABLET ORAL at 09:01

## 2022-05-22 RX ADMIN — SENNOSIDES AND DOCUSATE SODIUM 1 TABLET: 50; 8.6 TABLET ORAL at 20:22

## 2022-05-22 RX ADMIN — ATORVASTATIN CALCIUM 80 MG: 80 TABLET, FILM COATED ORAL at 20:22

## 2022-05-22 RX ADMIN — LISINOPRIL 20 MG: 20 TABLET ORAL at 09:00

## 2022-05-22 RX ADMIN — FUROSEMIDE 20 MG: 20 TABLET ORAL at 09:01

## 2022-05-22 RX ADMIN — Medication 150 MG: at 17:32

## 2022-05-22 RX ADMIN — MEMANTINE HYDROCHLORIDE 5 MG: 5 TABLET, FILM COATED ORAL at 09:01

## 2022-05-22 RX ADMIN — Medication 1000 UNITS: at 09:00

## 2022-05-22 RX ADMIN — METOPROLOL TARTRATE 100 MG: 100 TABLET, FILM COATED ORAL at 09:00

## 2022-05-22 RX ADMIN — MISOPROSTOL 100 MCG: 100 TABLET ORAL at 09:01

## 2022-05-22 RX ADMIN — MEMANTINE HYDROCHLORIDE 5 MG: 5 TABLET, FILM COATED ORAL at 20:26

## 2022-05-22 RX ADMIN — HYDRALAZINE HYDROCHLORIDE 10 MG: 10 TABLET, FILM COATED ORAL at 04:54

## 2022-05-22 NOTE — PROGRESS NOTES
Patient assessment completed. BP elevated this am. Scheduled meds given. BP recheck showed improvement. Appetite poor. States fatigued. Assisted to bed. Denies any pain or discomfort at this time. Will continue to monitor.

## 2022-05-22 NOTE — PLAN OF CARE
Problem: Safety - Adult  Goal: Free from fall injury  5/22/2022 1055 by Pamela Saldaña  Outcome: Progressing  Note: Patient remains free of falls this shift. Room free of clutter, bed in low position and call light within reach     Problem: ABCDS Injury Assessment  Goal: Absence of physical injury  5/22/2022 1055 by Pamela Saldaña  Outcome: Progressing  Note: Patient remains free of physical injury. Will continue to monitor     Problem: Skin/Tissue Integrity  Goal: Absence of new skin breakdown  Description: 1. Monitor for areas of redness and/or skin breakdown  2. Assess vascular access sites hourly  3. Every 4-6 hours minimum:  Change oxygen saturation probe site  4. Every 4-6 hours:  If on nasal continuous positive airway pressure, respiratory therapy assess nares and determine need for appliance change or resting period. 5/22/2022 1055 by Pamela Saldaña  Outcome: Progressing  Note: No new skin breakdown noted this shift.  Will continue to monitor

## 2022-05-22 NOTE — PROGRESS NOTES
Pt given a dose of 10mg hydralazine for a BP of 192/76. Pt denies dizziness and headache at this time while in bed.

## 2022-05-22 NOTE — PROGRESS NOTES
Shift assessment complete, Pt with elevated systolic BP of 003 to 074, Dr. Yenny Zepeda contacted and informed of patient's elevated systolic BP >650, PRN IV hydralazine order for BP grater than 220, and scheduled evening Lopressor of 100 mg. Per DO, reassess patient in the next hour and change order from systolic BP > 800 to Systolic BP > 634, see flow sheet. Pt. Remains asymptomatic. Call light and over bed table within reach, hourly rounding and frequent visual checks in place. Will continue to monitor.

## 2022-05-22 NOTE — PLAN OF CARE
Problem: Safety - Adult  Goal: Free from fall injury  Outcome: Progressing  Flowsheets (Taken 5/21/2022 2224)  Free From Fall Injury: Instruct family/caregiver on patient safety    Pt. Remains at risk for falls due to imbalanced gait and weakness. Fall prevention measures ongoing: fall sign posted at the door, video surveillance in place, 1:1 and education on the use of the call light and when to call for assistance. Call light and over bed table within reach. Hourly rounding and frequent visual checks in place. Problem: ABCDS Injury Assessment  Goal: Absence of physical injury  Outcome: Progressing   Pt remains free from accidental injury during this stay on the ARU. Will continue to monitor pt and assess per schedule and prn. Problem: Skin/Tissue Integrity  Goal: Absence of new skin breakdown  Description: 1. Monitor for areas of redness and/or skin breakdown  2. Assess vascular access sites hourly  3. Every 4-6 hours minimum:  Change oxygen saturation probe site  4. Every 4-6 hours:  If on nasal continuous positive airway pressure, respiratory therapy assess nares and determine need for appliance change or resting period.   Outcome: Progressing

## 2022-05-22 NOTE — PROGRESS NOTES
Department of Physical Medicine & Rehabilitation  Progress Note    Patient Identification:  Jack Rajan  [id number]  : 1940  Admit date: 2022    Chief Complaint: Acute cerebrovascular accident (CVA) ([address]    Subjective:   Feels well today with high BP overnight. She is concerned that her BP is to high. Will adjust BP meds as needed. Seen in her bed today and with OT. No other complaints. ROS: No f/c, n/v, cp     Objective:  Patient Vitals for the past 24 hrs:   BP Temp Temp src Pulse Resp SpO2 Weight   22 1159 -- -- -- -- -- 96 % --   22 1030 (!) 148/75 -- -- 58 -- -- --   22 0845 (!) 165/53 97.8 °F (36.6 °C) Oral 60 14 97 % --   22 0642 (!) 161/65 -- -- -- -- -- --   22 0525 (!) 161/60 -- -- -- -- -- --   22 0430 (!) 192/76 -- -- -- -- -- 162 lb 1.6 oz (73.5 kg)   22 2138 (!) 172/49 -- -- -- -- -- --   22 (!) 190/46 -- -- -- -- -- --   22 (!) 193/71 98.1 °F (36.7 °C) Oral 57 14 -- --   22 -- -- -- -- -- 96 % --     Const: Alert. WDWN. No distress  Eyes: Conjunctiva noninjected, no icterus noted; pupils equal, round, and reactive to light. HENT: Atraumatic, normocephalic; Oral mucosa moist  Neck: Trachea midline, neck supple. No thyromegaly noted. CV: Regular rate and rhythm, no murmur rub or gallop noted  Resp: Lungs clear to auscultation bilaterally, no rales wheezes or ronchi, no retractions. Respirations unlabored. GI: Soft, nontender, nondistended. Normal bowel sounds. No palpable masses. Skin: Normal temperature and turgor. No rashes or breakdown noted. Ext: No significant edema appreciated. No varicosities. MSK: No joint tenderness, erythema, warmth noted. AROM intact. Neuro: RUE 4/5, RLE 4/5  -Mental status: Alert. Oriented to person, place, time, situation. 3 word immediate and delayed recall (sock, bed, blue) intact. Attention intact (months of year in reverse).    -Language: Speech fluent, repetition and naming intact  -Cranial nerves: VFF, PERRL, EOMI, Facial sensation intact, Face symmetric, Hearing intact, Palate elevation symmetric, Shoulder shrug intact. Tongue midline.   -Sensation intact to light touch. -Motor examination reveals normal strength in all four limbs diffusely.   -No abnormalities with finger/nose noted. -Reflexes 2+ and symmetric. Negative Margot  Psych: Stable mood, normal judgement, normal affect     Laboratory data: Available via EMR. Last 24 hour lab  No results found for this or any previous visit (from the past 24 hour(s)). Therapy progress:  PT  Position Activity Restriction  Other position/activity restrictions: up with assistance  Objective     Sit to Stand: Contact guard assistance  Stand to sit: Contact guard assistance  Device: Rolling Walker  Assistance: Stand by assistance  Distance: 15', 60' x 2, small distances in gym  OT  PT Equipment Recommendations  Equipment Needed: No  Other: Pt owns RW/rollator/cane  Toilet - Technique: Ambulating  Equipment Used: Grab bars  Assessment        SLP          Body mass index is 27.82 kg/m². Assessment and Plan:  1. 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 Dysphagia- ALP evaluation MBS failed in the past  3. Dysarthria- SLP evaluation   4. Right apical lung nodule- found on CT- 3 month follow up   5. Schizophrenia- continue home medications         Impairments- Decreased functional mobility, Decreased ADLs     Impairments: Decreased functional mobility, Decreased ADLs     Bladder - high risk retention - Monitor PVRs, SC prn >300cc     Bowel - high risk constipation - colace BID, PRN miralax and MoM. follow bowel movements.  Enema or suppository if needed.      Safety - fall precautions     PPx  DVT: lovenox  GI: pantoprazole     FULL CODE         Rehab Progress: Improving  Anticipated Dispo: home  Services/DME: [facility]  ELOS: [facility] Galo D.O. M.P.H  PM&R  5/22/2022  1:11 PM

## 2022-05-23 LAB
ANION GAP SERPL CALCULATED.3IONS-SCNC: 10 MMOL/L (ref 3–16)
BASOPHILS ABSOLUTE: 0.1 K/UL (ref 0–0.2)
BASOPHILS RELATIVE PERCENT: 1 %
BUN BLDV-MCNC: 17 MG/DL (ref 7–20)
CALCIUM SERPL-MCNC: 9.4 MG/DL (ref 8.3–10.6)
CHLORIDE BLD-SCNC: 102 MMOL/L (ref 99–110)
CO2: 26 MMOL/L (ref 21–32)
CREAT SERPL-MCNC: 0.8 MG/DL (ref 0.6–1.2)
EOSINOPHILS ABSOLUTE: 0.2 K/UL (ref 0–0.6)
EOSINOPHILS RELATIVE PERCENT: 1.5 %
GFR AFRICAN AMERICAN: >60
GFR NON-AFRICAN AMERICAN: >60
GLUCOSE BLD-MCNC: 109 MG/DL (ref 70–99)
HCT VFR BLD CALC: 33.3 % (ref 36–48)
HEMOGLOBIN: 11.2 G/DL (ref 12–16)
LYMPHOCYTES ABSOLUTE: 2.8 K/UL (ref 1–5.1)
LYMPHOCYTES RELATIVE PERCENT: 25.2 %
MCH RBC QN AUTO: 34.3 PG (ref 26–34)
MCHC RBC AUTO-ENTMCNC: 33.8 G/DL (ref 31–36)
MCV RBC AUTO: 101.6 FL (ref 80–100)
MONOCYTES ABSOLUTE: 0.7 K/UL (ref 0–1.3)
MONOCYTES RELATIVE PERCENT: 6.3 %
NEUTROPHILS ABSOLUTE: 7.3 K/UL (ref 1.7–7.7)
NEUTROPHILS RELATIVE PERCENT: 66 %
PDW BLD-RTO: 13.6 % (ref 12.4–15.4)
PLATELET # BLD: 250 K/UL (ref 135–450)
PMV BLD AUTO: 10.2 FL (ref 5–10.5)
POTASSIUM REFLEX MAGNESIUM: 4.1 MMOL/L (ref 3.5–5.1)
RBC # BLD: 3.28 M/UL (ref 4–5.2)
SODIUM BLD-SCNC: 138 MMOL/L (ref 136–145)
WBC # BLD: 11.1 K/UL (ref 4–11)

## 2022-05-23 PROCEDURE — 97129 THER IVNTJ 1ST 15 MIN: CPT

## 2022-05-23 PROCEDURE — 97530 THERAPEUTIC ACTIVITIES: CPT

## 2022-05-23 PROCEDURE — 36415 COLL VENOUS BLD VENIPUNCTURE: CPT

## 2022-05-23 PROCEDURE — 6370000000 HC RX 637 (ALT 250 FOR IP): Performed by: PHYSICAL MEDICINE & REHABILITATION

## 2022-05-23 PROCEDURE — 97535 SELF CARE MNGMENT TRAINING: CPT

## 2022-05-23 PROCEDURE — 85025 COMPLETE CBC W/AUTO DIFF WBC: CPT

## 2022-05-23 PROCEDURE — 97110 THERAPEUTIC EXERCISES: CPT

## 2022-05-23 PROCEDURE — 1280000000 HC REHAB R&B

## 2022-05-23 PROCEDURE — 99232 SBSQ HOSP IP/OBS MODERATE 35: CPT | Performed by: PHYSICAL MEDICINE & REHABILITATION

## 2022-05-23 PROCEDURE — 97130 THER IVNTJ EA ADDL 15 MIN: CPT

## 2022-05-23 PROCEDURE — 97116 GAIT TRAINING THERAPY: CPT

## 2022-05-23 PROCEDURE — 6360000002 HC RX W HCPCS: Performed by: PHYSICAL MEDICINE & REHABILITATION

## 2022-05-23 PROCEDURE — 80048 BASIC METABOLIC PNL TOTAL CA: CPT

## 2022-05-23 PROCEDURE — 92526 ORAL FUNCTION THERAPY: CPT

## 2022-05-23 RX ORDER — LISINOPRIL 40 MG/1
40 TABLET ORAL DAILY
Status: DISCONTINUED | OUTPATIENT
Start: 2022-05-24 | End: 2022-05-27 | Stop reason: HOSPADM

## 2022-05-23 RX ORDER — METOPROLOL TARTRATE 50 MG/1
50 TABLET, FILM COATED ORAL 2 TIMES DAILY
Status: DISCONTINUED | OUTPATIENT
Start: 2022-05-23 | End: 2022-05-27 | Stop reason: HOSPADM

## 2022-05-23 RX ORDER — FLUOXETINE 10 MG/1
10 CAPSULE ORAL DAILY
Status: DISCONTINUED | OUTPATIENT
Start: 2022-05-23 | End: 2022-05-27 | Stop reason: HOSPADM

## 2022-05-23 RX ADMIN — MISOPROSTOL 100 MCG: 100 TABLET ORAL at 08:25

## 2022-05-23 RX ADMIN — MISOPROSTOL 100 MCG: 100 TABLET ORAL at 20:34

## 2022-05-23 RX ADMIN — ATORVASTATIN CALCIUM 80 MG: 80 TABLET, FILM COATED ORAL at 20:33

## 2022-05-23 RX ADMIN — METOPROLOL TARTRATE 50 MG: 50 TABLET, FILM COATED ORAL at 20:33

## 2022-05-23 RX ADMIN — SENNOSIDES AND DOCUSATE SODIUM 1 TABLET: 50; 8.6 TABLET ORAL at 20:33

## 2022-05-23 RX ADMIN — FUROSEMIDE 20 MG: 20 TABLET ORAL at 08:27

## 2022-05-23 RX ADMIN — METOPROLOL TARTRATE 100 MG: 100 TABLET, FILM COATED ORAL at 08:25

## 2022-05-23 RX ADMIN — BISACODYL 5 MG: 5 TABLET, COATED ORAL at 08:26

## 2022-05-23 RX ADMIN — LISINOPRIL 30 MG: 10 TABLET ORAL at 08:26

## 2022-05-23 RX ADMIN — Medication 1000 UNITS: at 08:26

## 2022-05-23 RX ADMIN — MEMANTINE HYDROCHLORIDE 5 MG: 5 TABLET, FILM COATED ORAL at 08:24

## 2022-05-23 RX ADMIN — ASPIRIN 81 MG 81 MG: 81 TABLET ORAL at 08:26

## 2022-05-23 RX ADMIN — MEMANTINE HYDROCHLORIDE 5 MG: 5 TABLET, FILM COATED ORAL at 20:33

## 2022-05-23 RX ADMIN — SENNOSIDES AND DOCUSATE SODIUM 1 TABLET: 50; 8.6 TABLET ORAL at 08:27

## 2022-05-23 RX ADMIN — ENOXAPARIN SODIUM 40 MG: 100 INJECTION SUBCUTANEOUS at 08:25

## 2022-05-23 NOTE — PROGRESS NOTES
Department of Physical Medicine & Rehabilitation  Progress Note    Patient Identification:  Jack Rajan  [id number]  : 1940  Admit date: 2022    Chief Complaint: Acute cerebrovascular accident (CVA) ([address]    Subjective:   Feeling tired this AM. Not as strong as yesterday. Otherwise engaged in therapy. She perseverates on using her left hand to assist her right hand requiring constant reminders and restraint to not to this. Therapist recommending supervised constraint therapy. ROS: No f/c, n/v, cp     Objective:  Patient Vitals for the past 24 hrs:   BP Temp Temp src Pulse Resp SpO2   22 0825 (!) 166/53 97.7 °F (36.5 °C) Oral 51 18 96 %   22 -- -- -- -- 18 94 %   22 1938 (!) 157/55 98.1 °F (36.7 °C) Oral 60 16 94 %   22 1555 -- -- -- -- -- 96 %   22 1445 (!) 169/48 -- -- 50 -- --   22 1159 -- -- -- -- -- 96 %   22 1030 (!) 148/75 -- -- 58 -- --     Const: Alert. WDWN. No distress  Eyes: Conjunctiva noninjected, no icterus noted; pupils equal, round, and reactive to light. HENT: Atraumatic, normocephalic; Oral mucosa moist  Neck: Trachea midline, neck supple. No thyromegaly noted. CV: Regular rate and rhythm, no murmur rub or gallop noted  Resp: Lungs clear to auscultation bilaterally, no rales wheezes or ronchi, no retractions. Respirations unlabored. GI: Soft, nontender, nondistended. Normal bowel sounds. No palpable masses. Skin: Normal temperature and turgor. No rashes or breakdown noted. Ext: No significant edema appreciated. No varicosities. MSK: No joint tenderness, erythema, warmth noted. AROM intact. Neuro: RUE 4/5, RLE 4/5  -Mental status: Alert. Oriented to person, place, time, situation. 3 word immediate and delayed recall (sock, bed, blue) intact. Attention intact (months of year in reverse).    -Language: Speech fluent, repetition and naming intact  -Cranial nerves: VFF, PERRL, EOMI, Facial sensation intact, Face symmetric, Hearing intact, Palate elevation symmetric, Shoulder shrug intact. Tongue midline.   -Sensation intact to light touch. -Motor examination reveals normal strength in all four limbs diffusely.   -No abnormalities with finger/nose noted. -Reflexes 2+ and symmetric. Negative Margot  Psych: Stable mood, normal judgement, normal affect     Laboratory data: Available via EMR.    Last 24 hour lab  Recent Results (from the past 24 hour(s))   Basic Metabolic Panel w/ Reflex to MG    Collection Time: 05/23/22  6:53 AM   Result Value Ref Range    Sodium 138 136 - 145 mmol/L    Potassium reflex Magnesium 4.1 3.5 - 5.1 mmol/L    Chloride 102 99 - 110 mmol/L    CO2 26 21 - 32 mmol/L    Anion Gap 10 3 - 16    Glucose 109 (H) 70 - 99 mg/dL    BUN 17 7 - 20 mg/dL    CREATININE 0.8 0.6 - 1.2 mg/dL    GFR Non-African American >60 >60    GFR African American >60 >60    Calcium 9.4 8.3 - 10.6 mg/dL   CBC auto differential    Collection Time: 05/23/22  6:53 AM   Result Value Ref Range    WBC 11.1 (H) 4.0 - 11.0 K/uL    RBC 3.28 (L) 4.00 - 5.20 M/uL    Hemoglobin 11.2 (L) 12.0 - 16.0 g/dL    Hematocrit 33.3 (L) 36.0 - 48.0 %    .6 (H) 80.0 - 100.0 fL    MCH 34.3 (H) 26.0 - 34.0 pg    MCHC 33.8 31.0 - 36.0 g/dL    RDW 13.6 12.4 - 15.4 %    Platelets 458 009 - 638 K/uL    MPV 10.2 5.0 - 10.5 fL    Neutrophils % 66.0 %    Lymphocytes % 25.2 %    Monocytes % 6.3 %    Eosinophils % 1.5 %    Basophils % 1.0 %    Neutrophils Absolute 7.3 1.7 - 7.7 K/uL    Lymphocytes Absolute 2.8 1.0 - 5.1 K/uL    Monocytes Absolute 0.7 0.0 - 1.3 K/uL    Eosinophils Absolute 0.2 0.0 - 0.6 K/uL    Basophils Absolute 0.1 0.0 - 0.2 K/uL       Therapy progress:  PT  Position Activity Restriction  Other position/activity restrictions: up with assistance  Objective     Sit to Stand: Contact guard assistance  Stand to sit: Contact guard assistance  Device: Rolling Walker  Assistance: Stand by assistance  Distance: 15', 60' x 2, small distances in gym  OT  PT Equipment Recommendations  Equipment Needed: No  Other: Pt owns RW/rollator/cane  Toilet - Technique: Ambulating  Equipment Used: Grab bars  Assessment        SLP          Body mass index is 27.82 kg/m². Assessment and Plan:  1. 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 Dysphagia- ALP evaluation MBS failed in the past  3. Dysarthria- SLP evaluation   4. Right apical lung nodule- found on CT- 3 month follow up   5. Schizophrenia- continue home medications    . Start Prozac 10mg qd. Has taken in the past with good results.      Impairments- Decreased functional mobility, Decreased ADLs     Impairments: Decreased functional mobility, Decreased ADLs     Bladder - high risk retention - Monitor PVRs, SC prn >300cc     Bowel - high risk constipation - colace BID, PRN miralax and MoM. follow bowel movements. Enema or suppository if needed.      Safety - fall precautions     PPx  DVT: lovenox  GI: pantoprazole     FULL CODE         Rehab Progress: Improving  Anticipated Dispo: home  Services/DME: [facility]  ELOS: [facility] Montalvo MD PGY3      This patient has been seen, examined, and discussed with the resident. This note has been altered to reflect my own examination findings, impression, and recommendations.      Leila Watson D.O. M.P.H  PM&R  5/23/2022  10:24 AM

## 2022-05-23 NOTE — PATIENT CARE CONFERENCE
Inpatient Rehabilitation  Weekly Team Conference Note  The [address]  [phone]  Patient Name: Marycruz Bullock        MRN: [id number]    : 1940  (80 y.o.)  Gender: female   Referring Practitioner: DO Mai  Diagnosis: CVA  The team conference for this patient was held on 2022 at 10:30am by:  Nasreen Oneill DO    Current/Goal QM SCORES  QM Current/Goal Score   Eating CARE Score: 5 / Discharge Goal: Independent   Oral Hygiene CARE Score: 5 / Discharge Goal: Independent   Shower/Bathing CARE Score: 2 / Discharge Goal: Independent   UB Dressing CARE Score: 3 / Discharge Goal: Independent   LB Dressing CARE Score: 2 / Discharge Goal: Independent   Putting on/off Footwear CARE Score: 1 / Discharge Goal: Independent   Toileting Hygiene CARE Score: 3 / Discharge Goal: Independent   Bladder Continence Bladder Continence: Incontinent daily    Bowel Continence Bowel Continence: Not rated    Toilet Transfers CARE Score: 4 / Discharge Goal: Independent   Shower/Bathe Self  CARE Score: 2 / Discharge Goal: Independent   Rolling Left and Right CARE Score: 4 / Discharge Goal: Independent   Sit to Lying CARE Score: 3 / Discharge Goal: Independent   Lying to Sitting on Bedside CARE Score: 3 / Discharge Goal: Independent   Sit to Stand CARE Score: 4 / Discharge Goal: Independent   Chair/Bed to Chair Transfer CARE Score: 4 / Discharge Goal: Independent   Car Transfers CARE Score: 3 / Discharge Goal: Independent   Walk 10 Feet CARE Score: 4 / Discharge Goal: Independent   Walk 50 Feet with Two Turns CARE Score: 4 / Discharge Goal: Independent   Walk 150 Feet CARE Score: 4 / Discharge Goal: Independent   Walk 10 Feet on Uneven Surfaces CARE Score: 4 / Discharge Goal: Independent   1 Step (Curb) CARE Score: 4 / Discharge Goal: Supervision or touching assistance   4 Steps CARE Score: 4 / Discharge Goal: Supervision or touching assistance   12 Steps CARE Score: 80 / Discharge Goal: Supervision or touching assistance   Picking up Object from Floor CARE Score: 88 / Discharge Goal: Independent     NURSING:  A&Ox: Level of Consciousness: Alert (0)  Orientation Level: Oriented X4  Madrigal Fall Risk Score: Total Score: 40  Admission BIMS: 14  Wounds/Incisions/Ulcers: N/A  Medication Review: Reviewed with patient   Pain: Occasional headache pain. PRN Tylenol effective. Consultations:   Imaging:   No orders to display     Active Comorbid Conditions:    Systems Review:   Renal: WNL, Dialysis:  Type: , Frequency:   Neurological: CVA, bipolar   Musculoskeletal: R upper extremity weakness   Respiratory: WNL  Cardiac/Circulatory/Peripheral Vascular: Hypertension   Abnormal/Relevant Test Results:   Abnormal/Relevant Lab Values:   CBC:   Recent Labs     05/23/22  0653   WBC 11.1*   HGB 11.2*   HCT 33.3*   .6*        BMP:   Recent Labs     05/23/22  0653      K 4.1      CO2 26   BUN 17   CREATININE 0.8     PT/INR: No results for input(s): PROTIME, INR in the last 72 hours. APTT: No results for input(s): APTT in the last 72 hours. Liver Profile:  Lab Results   Component Value Date    AST 15 05/16/2022    ALT 11 05/16/2022    BILITOT 0.4 05/16/2022    ALKPHOS 74 05/16/2022     Lab Results   Component Value Date    CHOL 148 05/16/2022    HDL 38 05/16/2022    TRIG 139 05/16/2022     Recent Labs     05/23/22  0653   WBC 11.1*   HGB 11.2*   HCT 33.3*      .6*     Recent Labs     05/23/22  0653      K 4.1      CO2 26   BUN 17   CREATININE 0.8     No results for input(s): AST, ALT, ALB, BILIDIR, BILITOT, ALKPHOS in the last 72 hours. No results for input(s): MG in the last 72 hours.   Recent Labs     05/23/22  0653   WBC 11.1*   HGB 11.2*   HCT 33.3*        Recent Labs     05/23/22  0653      K 4.1      CO2 26   BUN 17   CREATININE 0.8   CALCIUM 9.4     No results for input(s): AST, ALT, BILIDIR, BILITOT, ALKPHOS in the last 72 hours. No results for input(s): INR in the last 72 hours. No results for input(s): Tamara Ry in the last 72 hours. PHYSICAL THERAPY:  Bed Mobility: Scooting: Contact guard assistance,Minimal assistance (Min A at beginning of session seated EOB with VC/ tactile cues requires for sequencing, able to complete with CGA on mat later in session)    Transfers:  Sit to Stand: Contact guard assistance  Stand to sit: Contact guard assistance    Ambulation  Surface: level tile  Device: Rolling Walker  Assistance: Stand by assistance  Quality of Gait: decreased B LE step height/length with decreased heel strike, decreased R LE clearance - improvement in response to VC;  path deviation with visual cues required to correct, forward flexed posture, downward gaze- VC for obstacle avoidance  Gait Deviations: Deviated path,Slow Kaelyn,Decreased step height,Decreased step length  Distance: 400'  Comments: VC for forward gaze/upright posture, obstacle avoidance, and increased step height/length. Stairs  # Steps : 6  Stairs Height: 6\"  Rails: Bilateral  Device: No Device  Assistance: Contact guard assistance  Comment: step to pattern, slow and effortful, min VC for sequencing and for increased R foot clearance on ascent    OCCUPATIONAL THERAPY:  ADL  Grooming: Minimal assistance,Setup  Grooming Skilled Clinical Factors: Pt brushed and applied dentures seated on TTB w/ setup of supplies and min A to hold dentures while brushing. Pt combed hair seated on TTB w/ Min A to comb R side of head. UE Bathing: Minimal assistance  UE Bathing Skilled Clinical Factors: Pt washed/dried chest, abdomen, and RUE seated on TTB. Pt required Min A to wash LUE d/t decreased R  strength and inability to maintain grasp on washcloth and assist to thoroughly dry upper body. LE Bathing: Maximum assistance  LE Bathing Skilled Clinical Factors: Pt washed/dried cameron area and thighs seated on TTB.  Pt required assistance to wash/dry BLEs and feet seated on TTB and wash/dry rear cameron area in stance w/ BUE support on GB and CGA. UE Dressing: Minimal assistance,Increased time to complete,Setup  UE Dressing Skilled Clinical Factors: Pt doffed gown and donned t-shirt seated on TTB. Pt required Min A to thread RUE and pull over shoulder. LE Dressing: Maximum assistance,Increased time to complete,Setup  LE Dressing Skilled Clinical Factors: Pt doffed brief seated on RTS by pushing off with feet. Pt required assistance to thread BLEs into brief and pants seated on TTB and completed pants mgmt up over hips in stance w/ CGA, 1 UE support on GB, and assist to pull pants up on R side and in back. Pt required 2 trials of standing to pull pants up d/t fatigue. Toileting: Minimal assistance,Setup,Increased time to complete  Toileting Skilled Clinical Factors: Pt completed pants mgmt down w/ CGA-Min A to maintain balance and increased time to complete. Pt continent of urine and completed front cameron area seated w/ set up of wipes. Additional Comments: Pt typically sits on shower chair to complete bathing at baseline. Toilet Transfers: Toilet Transfers  Toilet - Technique: Ambulating  Equipment Used: Grab bars  Toilet Transfer: Minimal assistance    Tub/ShowerTransfers:  Shower Transfers  Shower - Transfer From: Yaakov Arreguin - Transfer Type: To and From  Shower - Transfer To: Transfer tub bench  Shower - Technique: Ambulating  Shower Transfers: Minimal assistance  Shower Transfers Comments: VCs for safe approach to TTB; Use of GBs    SPEECH THERAPY:  Assessment: Speech Therapy Diagnosis  Cognitive Diagnosis: Moderate cognitive-linguistic impairments  Speech Diagnosis: Mild dysarthria   Mild-moderate cognitive impairments in the areas of memory, problem solving, and attention. Mild dysarthria characterized by decreased speech intelligibility and loudness/volume in connected speech tasks.  Mild oral phase dysphagia with mild oral residue post swallow and cues required for using strategies to assist with safety of swallow and oral clearance. NUTRITION:   Current Weight: 162 lb 1.6 oz (73.5 kg) BMI (Calculated): 27.9  Current diet order: Current diet and supplement order: ADULT DIET; Dysphagia - Soft and Bite Sized  ADULT ORAL NUTRITION SUPPLEMENT; Breakfast, Dinner; Standard High Calorie/High Protein Oral Supplement     Feeding: Able to feed self  Room Service: Selective   NSG Recorded PO: PO Meals Eaten (%): 26 - 50%    Malnutrition Status Malnutrition Status: At risk for malnutrition (Comment)  Please see nutrition evaluation per nutrition standards of care for additional info. CASE MANAGEMENT:  Psychosocial/Behavioral Issues: none  Assessment:  Pt will be returning home with family assist and skilled Home Care. SW will also arrange any needed DME. Patient/Family Education provided by team:  Role of PT/OT    Patient Goals for Rehab stay:  1. To go home      Rehab Team Goals for patient for the Upcoming Week:  1. Increased independence for transfers and ambulation   2. Increase independence for lower body dressing    Barriers to Progress/Attainment of Goals & Efforts/Interventions to remove Barriers:  1. Decreased R UE/LE strength- continue PT/OT   2. Decreased cognition - continue SPT     Discharge Plan:  Estimated Length of Stay: 12 days  Destination: home health  Services at Discharge: [address] [place] [address], Occupational Therapy, Speech Therapy and Nursing 3x week  Equipment at Discharge: Will continue to assess.  no OT/PT needs, continue to assess PT needs   Community Resources:   Factors facilitating achievement of predicted outcomes: Family support, Cooperative and Pleasant  Barriers to the achievement of predicted outcomes: Cognitive deficit, Upper extremity weakness, Lower extremity weakness and Long standing deficits    Special Needs in the Upcoming Week:   [x] Family/Caregiver Education  [] Home visit  []Therapeutic Pass [] Consults:_______   [] Family/Caregiver Training  [] Stroke Risk Factor Education     [] Other;_______     TEAM SUMMARY: Will continue with current poc & goals until anticipated d/c date of 5/28/2022.     MD:   Stroke Risk Factors:   [] N/A for this patient [] HTN  []  Diabetes  [] Hyperlipidemia  [x]Obesity BMI >25  [] Atrial Fibrillation [x] Smoker (current)  [] Smoker (quit in last 12 months)  [] Sleep Apnea [] Other:     Risk for Readmission:  Moderate 14%    Justification for Continued Stay:   Criteria for continued IRF stay:  Based on my medical assessment of the patient and review of information from the interdisciplinary team, as part of this weekly team conference, the patient continues to meet the following criteria for IRF level of care:  [x] The patient requires 24-hour rehabilitation nursing care   [x] The patient requires an intensive rehabilitation therapy program  [x] The patient requires active and ongoing intervention of multiple therapy disciplines  [x] The patient requires continued physician supervision by a rehabilitation physician  [x] The patient requires an intensive and coordinated interdisciplinary team approach to the delivery of rehabilitative care    Medical Necessity-continued close physician medical management is required for:   [] Cardiac/Circulatory dysfunction  [] Respiratory/Pulmonary dysfunction  [] Integumentary complications  [] Peripheral Vascular dysfunction  [] Musculoskeletal dysfunction  [x] Neurological dysfunction d/t:  [x] CVA  [] SCI  [] TBI  [] Other: __________  [] Renal dysfunction  [] Hematologic dysfunction    [] Endocrine disorders  [] GI disorders     [] Genito-Urinary dysfunction    Assessment/Plan:  [x] The patient is making good progression towards their LTG's, is actively participating in, and has a reasonable expectation to continue to benefit from the intensive rehabilitation program.  [] The estimated discharge date has been changed from initial team conference due to:   [] The estimated discharge destination has been changed from initial team conference due to:     Rehab Team Members in attendance for Team Conference:  ARU Supervisor/PPS Coordinator:  Peter Cano, PT, DPT    Therapy Manager/ARU :  Elias Kumar, PT, DPT    Nursing Manager:  Brooke Cerna RN    Social Work:  Hayden Pillow, [place]    Nursing:  Ella Smith, KT Nelson, KT    Therapy:  Ramiro Reyes, PT  Lindsay Sanderson, PT, DPT  Meliton Holbrook PT, DPT     Joselo Faulkner, OTR/L    Valerie Judge MA-[facility], SLP    Nutrition:  Randy Dempsey, MS, RD, LD    I approve the established interdisciplinary plan of care as documented within the medical record of MALINDA Evans MDO. M.P.H  PM&R  5/24/2022  12:38 PM

## 2022-05-23 NOTE — PLAN OF CARE
Problem: Safety - Adult  Goal: Free from fall injury  Outcome: Progressing   Pt remains free from accidental injury during this stay on the ARU. Will continue to monitor pt and assess per schedule and prn. Problem: ABCDS Injury Assessment  Goal: Absence of physical injury  Outcome: Progressing  Flowsheets (Taken 5/23/2022 0239)  Absence of Physical Injury: Implement safety measures based on patient assessment   No reports of physical injury thus far this shift. Will continue to monitor. Problem: Skin/Tissue Integrity  Goal: Absence of new skin breakdown  Description:  Monitor for areas of redness and/or skin breakdown  Outcome: Progressing   Pt. Assisted with turning and repositioning in bed. Zinc paste applied to buttocks. No new skin issued found thus far this shift. Will continue to monitor.

## 2022-05-23 NOTE — PROGRESS NOTES
Speech Language Pathology  ACUTE REHAB UNIT  SPEECH/LANGUAGE PATHOLOGY      [x] Daily  [] Weekly Care Conference Note  [] Discharge    Patient:Andria English      :1940  RCR:[id number]  Rehab Dx/Hx: Acute cerebrovascular accident (CVA) ([facility] [address] [I63.9]    Precautions: [] Aspiration  [x] Fall risk  [] Sternal  [] Seizure [] Hip  [] Weight Bearing [] Other     ST Dx: [] Aphasia  [x] Dysarthria  [] Apraxia   [x] Oropharyngeal dysphagia [x] Cognitive Impairment  [] Other:   Date of Admit: 2022  Room #: 3106/3106-01  Date: 2022          Current Diet Order:ADULT DIET; Dysphagia - Soft and Bite Sized  ADULT ORAL NUTRITION SUPPLEMENT; Breakfast, Dinner; Standard High Calorie/High Protein Oral Supplement   Recommended Form of Meds: Meds in puree  Compensatory Swallowing Strategies : Eat/Feed slowly,Upright as possible for all oral intake,Remain upright for 30-45 minutes after meals,Small bites/sips,Check for pocketing of food on the Right,Alternate solids and liquids,Set up assist,Lingual sweep        Subjective: Pt seated upright in chair agreeable to SLP evaluation at this time.   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  Leisure & Hobbies: crossword puzzles, reading, watching TV    Dentition: Dentures top,Dentures bottom  [address]: Impaired  Vision Exceptions: Wears glasses for reading  Hearing  Hearing: Exceptions to [facility]  Hearing Exceptions: Hard of hearing/hearing concerns (reports she needs to get hearing aids)  Barriers toward progress: Cognitive deficit        Date: 2022      Tx session 1 Tx Session 2   Total Timed Code Min 10 30   Total Treatment Minutes 30 30   Individual Treatment Minutes 30 30   Group Treatment Minutes 0 0   Co-Treat Minutes 0 0   Brief Exception: N/A N/A   Pain None expressed None expressed   Pain Intervention: [] RN notified  [] Repositioned  [] Intervention offered and patient declined  [x] N/A  [] Other: [] RN notified  [] Repositioned  [] Intervention offered and patient declined  [x] N/A  [] Other:   Subjective:     Pt seated upright in chair agreeable to be seen during AM meal.  Pt seated upright in chair agreeable to SLP tx at this time. Objective / Goals:       Goal 1: Pt will consume least restrictive diet with adequate oral clearance via use of strategies and no s/s penetration/aspiration. Pt provided with soft and bite sized solids. Pt benefited from min verbal cues to alternate solids and liquids/implement liquid wash/lingual sweep. Mild oral residue remaining on posterior lingual surface after the swallow. Cleared well with use of strategies. Pt with x2 coughs following completion of swallow with straw sips thin liquids. x1 with taking meds whole with RN present, x1 with consecutive straw sips thin. Not directly targeted this session. Goal 2: The patient will improve oral preparation phase via bolus control/manipulation exercises to 5/5 each trial. Pt completed x10 lateral bolus control exercises. Pt with decreased ROM to L side, required mod assistance to move tongue laterally to L. Not directly targeted this session. Goal 3: The patient will improve oral motor function via therapeutic oral motor exercises to 5/5 each trial. Not directly targeted this session. Not directly targeted this session. The patient/caregiver will demonstrate understanding of compensatory strategies for improved swallowing safety. Pt stated x1 strategy to use prior to start of PO intake. SLP provided binary choice for pt to identify x2 additional strategies. As noted above pt benefited from min cues to implement across 100% of meal. Not directly targeted this session. Goal 1: Pt will complete problem solving tasks given min cues with 80% accuracy. Not directly targeted this session. Presented with problem solving scenario cards. Pt answered questions related to problems with 85% accuracy. Pt answered questions related to safety/awareness with getting up by self/use of call button with 100% accuracy. Goal 2: Pt will utilize compensatory strategies for recall given min cues with 80% accuracy. Pt utilized white board to identify therapies to be seen today x3/3. Pt read information on paper from visitors over the weekend and discussed information appropriately with SLP. Discussed x3 tasks completed with therapies. SLP wrote down tasks on piece of paper. Pt recalled x2/3 following 10 min delay, increasing to x3/3 given min verbal cue to use paper with written information. Goal 3: Pt will sustain attention for task completion given no more than x3 redirections/repetitions of instructions. Adequate attention throughout session. No redirections required to attend to cues/tasks. Pt with increased fatigue impacting attention at times with increased repetitions required. Goal 4: Pt will utilize speech strategies in basic conversational tasks for increased intelligibility of 90%   Not directly targeted this session. Adequate speech intelligibility in basic conversational tasks given min verbal cues to increase volume/loudness when pressing call button for nursing. Other areas targeted:     Education:   Educated re: rationale for tasks, swallow strategies to implement. Educated re: strategies for recall/speech clarity, rationale for problem solving questions. Safety Devices: [x] Call light within reach  [x] Chair alarm activated and connected to nurse call light system  [] Bed alarm activated   [] Other: [x] Call light within reach  [x] Chair alarm activated and connected to nurse call light system  [] Bed alarm activated   [] Other:   Assessment:  . Mild-moderate cognitive impairments in the areas of memory, problem solving, and attention.  Mild dysarthria characterized by decreased speech intelligibility and loudness/volume in connected speech tasks. Mild oral phase dysphagia with mild oral residue post swallow and cues required for using strategies to assist with safety of swallow and oral clearance. Plan:  Continue as per POC.      Interventions used this date:  [x] Speech/Language Treatment  [] Instruction in HEP  [x] Dysphagia Treatment [x] Cognitive Treatment   [] Other:    Discharge recommendations:  [] Home independently  [x] Home with assistance []  24 hour supervision  [] ECF [] Other  Continued Tx Upon Discharge: ? [] Yes    [] No    [x] TBD based on progress while on ARU     [] Vital Stim indicated     [] Other:   Estimated discharge date: Not yet established     Electronically signed by:  Jhon Nieves M.A., [address] [place] [address]

## 2022-05-23 NOTE — PROGRESS NOTES
Physical Therapy  Facility/Department: [facility]  Rehabilitation Physical Therapy Treatment Note    NAME: Alexia Clark  : 1940 (74 y.o.)  MRN: [id number]  CODE STATUS: Full Code    Date of Service: 22    Chart Reviewed: Yes  Patient assessed for rehabilitation services?: Yes  Additional Pertinent Hx: Pt is a 81 yo female admitted 22 for CVA. neuro consult pending; barium swallow: pending; Brain MRI:  infarct in the left thalamocapsular junction, No intracranial hemorrhage; chest XR: (-) PTX/effusion; CTA head/neck: (-) occlusion/flow limiting stenosis; PMH: COPD, schizophrenia  Family / Caregiver Present: No  Referring Practitioner: DO Mai  Diagnosis: CVA  General Comment  Comments: Pt educated on plan of care, transfer training, and gait training. Restrictions:  Restrictions/Precautions: Seizure; Fall Risk  Position Activity Restriction  Other position/activity restrictions: up with assistance     SUBJECTIVE  Subjective: Pt seated in recliner upon approach and reporting fatigue but agreable to PT.       OBJECTIVE   Functional Mobility  Transfers  Sit to Stand: Contact guard assistance  Stand to sit: Contact guard assistance  Comment: completed at recliner/wc/chair with RW, VC/ tactile cues for hand placement all transfers; recurrent reminders for use of R UE without assist from L UE  Balance  Posture: Fair (kyphosis)  Standing - Static: Fair (CGA without RW)  Standing - Dynamic: Fair;- (SBA for ambulation with RW)    Environmental Mobility  Ambulation  Surface: level tile  Device: Rolling Walker  Assistance: Stand by assistance  Quality of Gait: decreased B LE step height/length with decreased heel strike, decreased R LE clearance - improvement in response to VC;  path deviation with visual cues required to correct, forward flexed posture, downward gaze- VC for obstacle avoidance  Gait Deviations: Deviated path; Slow Kaelyn;Decreased step height;Decreased step length  Distance: 400'  Comments: VC for forward gaze/upright posture, obstacle avoidance, and increased step height/length. PT Exercises  Exercise Treatment: x10 standing B LE standing marches and hip abduction, mini sqauts and heel raises. VC/demonstration for technique provided. Seated rest breaks required throughout exercises. Exercise Equipment: SCI FIT completed for 2 x 2 min with B UEs and LEs with AROM (ocassional AAROM to encourage increased AROM/muscle activation from pt). Max VC/encouragement required to keep patient engaged in activity d/t fatigue. Pt unable to reach high encough speed to turn on SCI FIT screen. ASSESSMENT       Activity Tolerance  Activity Tolerance: Patient limited by fatigue  Activity Tolerance Comments: Pt very fatigued throughout session with pt nodding off and requiring cues to stay awake with all activities other than ambulation    Assessment  Assessment: Pt very tired this date with VC required to stay awake throughout session for all activities other than ambulation. Pt continues to require SBA for ambulation with RW with recurrent VC for increased step height and forward gaze/obstacle avoidance. Pt would benefit from continued skilled PT inorder to progress strength, functional mobility and independence. Treatment Diagnosis: decreased functional mobility  Decision Making: Medium Complexity  Barriers to Learning: decreased short term memory  Discharge Recommendations: 24 hour supervision or assist;Home with Home health PT  PT D/C Equipment  Other: Pt owns RW/rollator/cane  PT Equipment Recommendations  Equipment Needed: No  Other: Pt owns RW/rollator/cane    CLINICAL IMPRESSION   Pt very tired this date with VC required to stay awake throughout session for all activities other than ambulation. Pt continues to require SBA for ambulation with RW with recurrent VC for increased step height and forward gaze/obstacle avoidance.  Pt would benefit from continued skilled PT inorder to progress strength, functional mobility and independence. GOALS  Patient Goals   Patient goals : to return home  Long Term Goals  Time Frame for Long term goals : 2 weeks- all ongoing  Long term goal 1: Pt will complete bed mobility with mod I (pt owns hospital bed)  Long term goal 2: Pt will complete sit<>stand transfers with mod I and LRAD  Long term goal 3: Pt will complete 150' ambulation with mod I and LRAD  Long term goal 4: Pt will complete 5 steps with B handrails and supervision  Long term goal 5: Pt will complete car transfer with mod I and LRAD    PLAN OF CARE  Frequency: 1-2 treatment sessions per day, 5-7 days per week  Plan  Plan:  (60 min, 5 days a week)  Current Treatment Recommendations: Strengthening;ROM;Balance training;Functional mobility training;Transfer training; Endurance training;Gait training;Stair training  Safety Devices  Type of Devices: Call light within reach;Nurse notified; Chair alarm in place; Left in chair  Restraints  Restraints Initially in Place: No      Therapy Time   Individual Concurrent Group Co-treatment   Time In  1045         Time Out  1145         Minutes  60           Total Tx Minutes: [address] [facility],[address] [place], [place] [id number]

## 2022-05-23 NOTE — PROGRESS NOTES
Pt. Assisted to bathroom CGA X 1GB/walker, complaint of dizziness after returning to bed. /55, hr 60, no s/sx of dyspnea on exertion thus far. . Call light and over bed table within reach, hourly rounding and frequent visual checks in place.

## 2022-05-23 NOTE — PLAN OF CARE
Problem: Safety - Adult  Goal: Free from fall injury  Outcome: Progressing  Note: Pt remains free of falls thus far this shift. All fall precautions in place and functioning properly. Problem: Skin/Tissue Integrity  Goal: Absence of new skin breakdown  Description: 1. Monitor for areas of redness and/or skin breakdown  2. Assess vascular access sites hourly  3. Every 4-6 hours minimum:  Change oxygen saturation probe site  4. Every 4-6 hours:  If on nasal continuous positive airway pressure, respiratory therapy assess nares and determine need for appliance change or resting period. Outcome: Progressing  Note: Patient offered to be toileted every two hours and PRN, skin barrier applied as needed. Staff assists patient with repositioning and pillow support is provided when needed. Patient educated on offloading techniques and verbalizes understanding. Problem: Nutrition Deficit:  Goal: Optimize nutritional status  Outcome: Progressing  Note: Patient continues to have a good appetite and eat adequately.

## 2022-05-23 NOTE — PROGRESS NOTES
Occupational Therapy  Facility/Department: [facility] ACUTE REH[facility] UNIT  Rehabilitation Occupational Therapy Daily Treatment Note    Date: 22  Patient Name: Hortencia Purdy       Room: 7884/8304-22  MRN: [id number]  Account: [de-identified]   : 1940  (77 y.o.) Gender: female                    Past Medical History:  has no past medical history on file. Past Surgical History:   has no past surgical history on file. Restrictions  Restrictions/Precautions: Seizure; Fall Risk  Other position/activity restrictions: up with assistance    Subjective  Subjective: Pt asleep in recliner upon arrival, easily aroused and agreeable to therapy. Pt stating \"I'm so tired today\" several times throughout session. Restrictions/Precautions: Seizure; Fall Risk             Objective     Cognition  Overall Cognitive Status: Exceptions  Arousal/Alertness: Appropriate responses to stimuli;Delayed responses to stimuli  Following Commands: Follows multistep commands with increased time; Follows one step commands consistently  Attention Span: Difficulty dividing attention  Memory: Decreased short term memory  Safety Judgement: Decreased awareness of need for assistance;Decreased awareness of need for safety  Problem Solving: Assistance required to generate solutions  Insights: Decreased awareness of deficits  Initiation: Does not require cues  Sequencing: Requires cues for some  Cognition Comment: Slightly delayed processing, less participation in conversation likely d/t fatigue; Pt perseverating on using L hand to assist R hand with tasks, required consistent education and reminders/cues throughout session to allow RUE to complete tasks on it's own.  Little carryover noted and pt continues to use LUE to assist RUE w/ tasks by completing self-HOHA when unnecessary  Orientation  Overall Orientation Status: Within Normal Limits   Perception  Overall Perceptual Status:  (Pt perseverating on using L hand to assist R hand w/ tasks when R hand is able to complete the task on it's own w/o assistance; OT used tactile cues and physical blocking to refrain L hand from assisting R hand w/ tasks)     ADL  Grooming/Oral Hygiene  Assistance Level: Stand by assist;Verbal cues; Increased time to complete  Skilled Clinical Factors: Pt completed hand hygiene in stance at sink using self-HOHA to wash R hand; VCs to sequence using soap and to dry hands off w/ paper towels before walking away from sink  Toileting  Assistance Level: Contact guard assist;Verbal cues; Increased time to complete  Skilled Clinical Factors: Pt continent of bladder and bowel, completed front and rear pericare in stance w/ CGA and set up of wipes. Pt required VCs to use RUE to assist with pulling up pants  Toilet Transfers  Technique:  (ambulating w/ RW)  Equipment: Raised toilet seat with arms  Assistance Level: Contact guard assist  Skilled Clinical Factors: VCs for hand placement          Functional Mobility  Device: Rolling walker  Activity: To/From bathroom; To/From therapy gym  Assistance Level: Contact guard assist  Skilled Clinical Factors: CGA-SBA, slow pace, decreased step length and height  Transfers  Surface: From chair with arms; To chair with arms  Additional Factors: Hand placement cues  Device: Walker (RW)  Sit to Stand  Assistance Level: Contact guard assist  Skilled Clinical Factors: Pt required VCs to push up from chair w/ BUEs vs. using L hand to place R hand on RW  Stand to Sit  Assistance Level: Contact guard assist  Skilled Clinical Factors: VCs to reach back for surface w/ BUEs   OT Exercises  Exercise Treatment: Pt completed x5 reps BUE shoulder flexion and x10 reps BUE elbow flexion while holding 2.2 lb weighted ball. Pt required increased time to complete each rep and VCs for technique/to move through full ROM. Perseveration  Pt perseverating on using L hand to assist R hand w/ tasks when R hand is able to complete the task on it's own w/o assistance.  With pt's consent, OT used tactile cues and physical restraint to refrain L hand from assisting R hand w/ tasks during therapy session. Coordination  Gross Motor Coordination  Pt completed the following activities to address gross motor and bimanual coordination for ADLs. 1) Pt held 2.2 lb weighted ball w/ BUEs and instructed to reach w/ ball and touch 10 targets in various planes. Pt frequently moving her L hand over her R fingers wrapped around the ball to assist R hand with gripping the ball, although pt was able to maintain grasp on ball w/ R hand w/o L hand helping. Pt required frequent VCs and adjustment of grasp on ball to avoid over-compensating w/ L hand. 2) Pt instructed to  a cup w/ R hand, scoop beans out of a bucket, and pour into cup in L hand. Pt frequently putting down cup in L hand to use HOHA to help R hand scoop beans although her R hand was able to complete the task w/o assistance. OT held pt's L hand against the cup to prevent temptation to use L hand to assist R hand with completing task. Pt scooped beanbs w/ R hand until L cup was full, poured beans from L cup into R cup, and poured beans from R cup back into bucket. Pt demonstrating some difficulty w/ fully pronating/supinating R forearm however was able to complete the task w/o dropping the cup and w/ min-mod spillage of beans. 2) Pt instructed to scoop beans from bucket using shovel in R hand and transfer beans to smaller bucket on R side of table. OT held pt's L hand against tabletop to prevent pt from compensating. Assessment  Assessment  Assessment: Pt was more limited by fatigue this date requiring increased time to complete all tasks. Pt continues to over-compensate w/ using LUE hand over hand to assist RUE with performing functional tasks, and requires consistent education on allowing RUE to complete tasks independently. Pt demonstrating improved  strength as evidenced by grasping and pouring a weighted cup w/ RUE without dropping.  Pt benefits from continued skilled OT to maximize independence and safety with functional tasks, cont OT per POC. Activity Tolerance: Patient limited by fatigue  Discharge Recommendations: 24 hour supervision or assist;Home with Home health OT  Factors Affecting Discharge: Pt stated her family is interested in d/c to the same SNF her  is currently at, however pt would prefer to d/c home. OT Equipment Recommendations  Other: cont to assess  Safety Devices  Safety Devices in place: Yes  Type of devices: Left in chair;Chair alarm in place;Call light within reach;Nurse notified    Patient Education  Education  Education Given To: Patient  Education Provided: Plan of Care;Role of Therapy;Transfer Training; Safety;ADL Function  Education Provided Comments: Allowing RUE to perform tasks independently w/o automatically using LUE to assist, safety with transfers  Education Method: Verbal;Demonstration  Barriers to Learning: Cognition  Education Outcome: Verbalized understanding;Continued education needed    Plan  Plan  Times per Week: 5x/week, 60 mins/day  Times per Day: Daily  Current Treatment Recommendations: Strengthening;ROM;Balance training;Functional mobility training; Endurance training;Gait training;Cognitive reorientation; Neuromuscular re-education; Safety education & training;Self-Care / ADL; Patient/Caregiver education & training;Home management training    Goals  Patient Goals   Patient goals : \"to get through therapy\"  Short Term Goals  Time Frame for Short term goals: 2 Weeks  Short Term Goal 1: Pt will complete UE bathing and UE dressing i'ly - ongoing  Short Term Goal 2: Pt will complete LE bathing and LE dressing Mod I - ongoing  Short Term Goal 3: Pt will complete toileting, including toilet transfer, Mod I - ongoing  Short Term Goal 4: Pt will demonstrate independence w/ RUE HEP to increase strength for ADLs - ongoing  Short Term Goal 5: Pt will complete standing-level grooming > 5 mins Mod I - ongoing    Therapy Time   Individual Concurrent Group Co-treatment   Time In 0830         Time Out 0930         Minutes [address] [place], [place]

## 2022-05-23 NOTE — PROGRESS NOTES
Alert and oriented with slight slurred speech and delayed responses. BP improved to 126/66 with increased dose of lisinopril. HR dropped to 45. Remainder of VSS on RA. Asymptomatic.  [facility] aware and decreased metoprolol. See orders. CGA x 1 with gait belt and front wheel walker. Tendency to lean to the right with R side and BLE weakness. Denying pain or discomfort.

## 2022-05-24 PROCEDURE — 97530 THERAPEUTIC ACTIVITIES: CPT

## 2022-05-24 PROCEDURE — 92526 ORAL FUNCTION THERAPY: CPT

## 2022-05-24 PROCEDURE — 99233 SBSQ HOSP IP/OBS HIGH 50: CPT | Performed by: PHYSICAL MEDICINE & REHABILITATION

## 2022-05-24 PROCEDURE — 6360000002 HC RX W HCPCS: Performed by: PHYSICAL MEDICINE & REHABILITATION

## 2022-05-24 PROCEDURE — 6370000000 HC RX 637 (ALT 250 FOR IP): Performed by: PHYSICAL MEDICINE & REHABILITATION

## 2022-05-24 PROCEDURE — 6370000000 HC RX 637 (ALT 250 FOR IP): Performed by: SURGERY

## 2022-05-24 PROCEDURE — 92507 TX SP LANG VOICE COMM INDIV: CPT

## 2022-05-24 PROCEDURE — 97110 THERAPEUTIC EXERCISES: CPT

## 2022-05-24 PROCEDURE — 97535 SELF CARE MNGMENT TRAINING: CPT

## 2022-05-24 PROCEDURE — 99232 SBSQ HOSP IP/OBS MODERATE 35: CPT | Performed by: NURSE PRACTITIONER

## 2022-05-24 PROCEDURE — 97116 GAIT TRAINING THERAPY: CPT

## 2022-05-24 PROCEDURE — 1280000000 HC REHAB R&B

## 2022-05-24 PROCEDURE — 97129 THER IVNTJ 1ST 15 MIN: CPT

## 2022-05-24 RX ORDER — MECOBALAMIN 5000 MCG
10 TABLET,DISINTEGRATING ORAL NIGHTLY
Status: DISCONTINUED | OUTPATIENT
Start: 2022-05-24 | End: 2022-05-27 | Stop reason: HOSPADM

## 2022-05-24 RX ADMIN — MEMANTINE HYDROCHLORIDE 5 MG: 5 TABLET, FILM COATED ORAL at 21:39

## 2022-05-24 RX ADMIN — Medication 1000 UNITS: at 08:38

## 2022-05-24 RX ADMIN — MEMANTINE HYDROCHLORIDE 5 MG: 5 TABLET, FILM COATED ORAL at 08:36

## 2022-05-24 RX ADMIN — MISOPROSTOL 100 MCG: 100 TABLET ORAL at 08:36

## 2022-05-24 RX ADMIN — ASPIRIN 81 MG 81 MG: 81 TABLET ORAL at 08:38

## 2022-05-24 RX ADMIN — Medication 150 MG: at 21:40

## 2022-05-24 RX ADMIN — METOPROLOL TARTRATE 50 MG: 50 TABLET, FILM COATED ORAL at 08:38

## 2022-05-24 RX ADMIN — FLUOXETINE 10 MG: 10 CAPSULE ORAL at 08:37

## 2022-05-24 RX ADMIN — Medication 10 MG: at 21:39

## 2022-05-24 RX ADMIN — ENOXAPARIN SODIUM 40 MG: 100 INJECTION SUBCUTANEOUS at 08:37

## 2022-05-24 RX ADMIN — MISOPROSTOL 100 MCG: 100 TABLET ORAL at 21:39

## 2022-05-24 RX ADMIN — ATORVASTATIN CALCIUM 80 MG: 80 TABLET, FILM COATED ORAL at 21:39

## 2022-05-24 RX ADMIN — METOPROLOL TARTRATE 50 MG: 50 TABLET, FILM COATED ORAL at 21:39

## 2022-05-24 RX ADMIN — FUROSEMIDE 20 MG: 20 TABLET ORAL at 08:37

## 2022-05-24 RX ADMIN — SENNOSIDES AND DOCUSATE SODIUM 1 TABLET: 50; 8.6 TABLET ORAL at 08:38

## 2022-05-24 RX ADMIN — LISINOPRIL 40 MG: 40 TABLET ORAL at 08:37

## 2022-05-24 NOTE — PROGRESS NOTES
[facility] Stroke Program Follow-up Note     Patient Name: Taj Reina YOB: 1940   Sex: Female Age: 80 yrs     Reason for Visit: Follow up for ischemic stroke    Interval Hx: Patient is a 79 y/o F w/ L thalamic ischemic stroke. Doing well in rehab. Working w/ therapy, patient able to walk in halls w/o walker today. Subjective / ROS: No new complaints today     Social History     Tobacco Use    Smoking status: Current Every Day Smoker     Packs/day: 2.00     Types: Cigarettes    Smokeless tobacco: Never Used   Substance Use Topics    Alcohol use: Never    Drug use: Never       Vitals /64   Pulse 74   Temp 98.1 °F (36.7 °C) (Oral)   Resp 16   Ht 5' 4\" (1.626 m)   Wt 162 lb 1.6 oz (73.5 kg)   SpO2 95%   BMI 27.82 kg/m²    Diet ADULT DIET; Dysphagia - Soft and Bite Sized  ADULT ORAL NUTRITION SUPPLEMENT; Breakfast, Dinner; Standard High Calorie/High Protein Oral Supplement   Isolation No active isolations     LABS     Basic Metabolic Profile Recent Labs     05/23/22  0653      K 4.1      CO2 26   BUN 17   CREATININE 0.8   GLUCOSE 109*   CALCIUM 9.4      CBC / INR Recent Labs     05/23/22  0653   WBC 11.1*   RBC 3.28*   HGB 11.2*   HCT 33.3*         OTHER Lab Results   Component Value Date    LABA1C 4.7 05/16/2022    LDLCALC 82 05/16/2022    CALCIUM 9.4 05/23/2022        DIAGNOSTICS    CTA of Head / Neck w/ Contrast:  CTA HEAD NECK W CONTRAST 05/16/2022  Impression  1. No large vessel occlusion. 2. No flow-limiting steno-occlusive disease. 50% stenosis of the right ICA. 20% stenosis of the left ICA. 3. Ill-defined 6 mm right apical nodule, favored be inflammatory. Recommend follow-up chest CT in 3 months. MRI Brain w/o Contrast:   MRI BRAIN WO CONTRAST 05/16/2022  Impression  Small recent infarct in the left thalamocapsular junction. No intracranial hemorrhage. Mild atrophy and mild-to-moderate chronic small vessel ischemic change.  Probable tiny remote lacunar infarcts as detailed. ECHO 5/7/2022  Summary   Left ventricular cavity size is normal with normal left ventricular wall   thickness. Ejection fraction is estimated to be 55-60%. No regional wall motion abnormalities   Mild to moderate Aortic valve insufficiency   A bubble study was performed and fails to show evidence of shunting. Normal right ventricular size and function. Mild tricuspid regurgitation. PHYSICAL EXAMINATION     Patient seen and examined   General: Well developed. Alert and cooperative in no acute distress. HENT: atraumatic, neck supple  Eyes: Sclera clear  Pulmonary: unlabored respiratory effort. Cardiovascular:  Warm well perfused. No peripheral edema. Gastrointestinal: abdomen soft, ND. Neurologic Exam:   Mental Status: Awake, alert, oriented x 4, speech clear and appropriate  Cranial Nerves: Pupils equal, gaze conjugate  Motor Exam: R hemiparesis (4/5), L side strong. Patient able to ambulate while working w/ therapy. ASSESSMENT & RECOMMENDATIONS   Patient is a 81 y/o F w/ L thalamic ischemic stroke, likely related to small vessel disease. Stroke Etiology & Plan:   Small vessel    - Continue ASA / Statin     Patient specific modifiable stroke risk factors:  1. HTN - Overall goal /90 - Improved control over last 24 hours  2. Current Smoker - encourage cessation   3. BMI 27.82 - encourage diet / exercise       DVT Prophylaxis: Lovneox   Stroke education provided this visit: Stroke booklet / BEFAST education left at bedside. Patient working w/ therapy  Neurology Outpatient Follow up: F/u w/ Livia in 3 months s/p discharge       FE Schulz - [facility] [place]   Neurology Nurse Practitioner  05/24/22  2:22 PM  Neurology Line: [phone]  [facility]: [facility]    I spent 22 minutes in the care of this patient.   Over 50% of that time was in face-to-face counseling regarding disease process, diagnostic testing, preventative measures, and answering patient and family questions.

## 2022-05-24 NOTE — PROGRESS NOTES
Speech Language Pathology  ACUTE REHAB UNIT  SPEECH/LANGUAGE PATHOLOGY      [x] Daily  [x] Weekly Care Conference Note  [] Discharge    Patient:Andria Dawson      :1940  NA  Rehab Dx/Hx: Acute cerebrovascular accident (CVA) ([facility] [address] [I63.9]    Precautions: [] Aspiration  [x] Fall risk  [] Sternal  [] Seizure [] Hip  [] Weight Bearing [] Other     ST Dx: [] Aphasia  [x] Dysarthria  [] Apraxia   [x] Oropharyngeal dysphagia [x] Cognitive Impairment  [] Other:   Date of Admit: 2022  Room #: 3106/3106-01  Date: 2022          Current Diet Order:ADULT DIET; Dysphagia - Soft and Bite Sized  ADULT ORAL NUTRITION SUPPLEMENT; Breakfast, Dinner; Standard High Calorie/High Protein Oral Supplement   Recommended Form of Meds: Meds in puree  Compensatory Swallowing Strategies : Eat/Feed slowly,Upright as possible for all oral intake,Remain upright for 30-45 minutes after meals,Small bites/sips,Check for pocketing of food on the Right,Alternate solids and liquids,Set up assist,Lingual sweep        Subjective: Pt seated upright in chair agreeable to SLP evaluation at this time.   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  Leisure & Hobbies: crossword puzzles, reading, watching TV    Dentition: Dentures top,Dentures bottom  Brandon [address] 63: Impaired  Vision Exceptions: Wears glasses for reading  Hearing  Hearing: Exceptions to [facility]  Hearing Exceptions: Hard of hearing/hearing concerns (reports she needs to get hearing aids)  Barriers toward progress: Cognitive deficit        Date: 2022       Tx session 1 Tx Session 2 Weekly Summary   Total Timed Code Min 0 15    Total Treatment Minutes 30 30    Individual Treatment Minutes 30 30    Group Treatment Minutes 0 0    Co-Treat Minutes 0 0    Brief Exception: N/A N/A    Pain None expressed None expressed    Pain Intervention: [] RN notified  [] Repositioned  [] Intervention offered and patient declined  [x] N/A  [] Other: [] RN notified  [] Repositioned  [] Intervention offered and patient declined  [x] N/A  [] Other:    Subjective:     Pt seated upright in chair agreeable to be seen during AM meal.  Pt seated upright in chair agreeable to SLP tx at this time. Objective / Goals:        Goal 1: Pt will consume least restrictive diet with adequate oral clearance via use of strategies and no s/s penetration/aspiration. Pt with soft and bite sized solids this AM. Pt with no s/s penetration/aspiration. Min cues required to implement strategies to clear residuals and decrease bolus volume in oral cavity. Mild oral residue, cleared well with liquid wash. Not directly targeted this session. PROGRESSING; CONTINUE     Goal 2: The patient will improve oral preparation phase via bolus control/manipulation exercises to 5/5 each trial. Not directly targeted this session. Completed x15 lateral bolus control exercises against resistance with toothette. Improved from yesterday, however cues required for keeping tongue midline and to increase effort for sweeping toothette back and forth. PROGRESSING; CONTINUE     Goal 3: The patient will improve oral motor function via therapeutic oral motor exercises to 5/5 each trial. Not directly targeted this session. Completed x10 oral motor exercises with adequate effort and strength given min verbal cues. Increased effort this date compared to exercises completed yesterday. Continued decreased strength to bring tongue laterally to L compared to R. PROGRESSING; CONTINUE     The patient/caregiver will demonstrate understanding of compensatory strategies for improved swallowing safety. Required mod verbal cues via binary choice to determine swallow strategies.   Stated x3 strategies to correctly use during meal, improved carry over/recall from this AM. PROGRESSING; CONTINUE Goal 1: Pt will complete problem solving tasks given min cues with 80% accuracy. Not directly targeted this session. Not directly targeted this session. PROGRESSING; CONTINUE   Goal 2: Pt will utilize compensatory strategies for recall given min cues with 80% accuracy. Not directly targeted this session. Pt recalled items from tray with 66% accuracy, increasing to 100% given min cues. PROGRESSING; CONTINUE   Goal 3: Pt will sustain attention for task completion given no more than x3 redirections/repetitions of instructions. Adequate attention to all tasks this session. No redirections required. No redirections required. Pt attentive to all tasks and responds well to cues as needed. GOAL MET     Goal 4: Pt will utilize speech strategies in basic conversational tasks for increased intelligibility of 90%   Pt utilized speech strategies in open conversational tasks for 90% intelligibility. Pt confirmed speech sounding \"better\" and almost back to baseline. SLP stated agreement with pt and importance to continue using  GOAL MET   Other areas targeted:      Education:   Educated re: speech strategies, swallow strategies with diet level. Educated re: swallow strategies, exercises. Safety Devices: [x] Call light within reach  [x] Chair alarm activated and connected to nurse call light system  [] Bed alarm activated   [] Other: [x] Call light within reach  [x] Chair alarm activated and connected to nurse call light system  [] Bed alarm activated   [] Other:    Assessment:  . Mild-moderate cognitive impairments in the areas of memory, problem solving, and attention. Mild dysarthria characterized by decreased speech intelligibility and loudness/volume in connected speech tasks. Mild oral phase dysphagia with mild oral residue post swallow and cues required for using strategies to assist with safety of swallow and oral clearance. Plan:  Continue as per POC.       Interventions used this date:  [x] Speech/Language Treatment  [] Instruction in HEP  [x] Dysphagia Treatment [x] Cognitive Treatment   [] Other:    Discharge recommendations:  [] Home independently  [x] Home with assistance []  24 hour supervision  [] ECF [] Other  Continued Tx Upon Discharge: ? [] Yes    [] No    [x] TBD based on progress while on ARU     [] Vital Stim indicated     [] Other:   Estimated discharge date: 5/28/22     Electronically signed by:  Monae Brizuela M.A., [address] [place] [address]

## 2022-05-24 NOTE — PROGRESS NOTES
Alert and oriented. Responds appropriately and follows commands. VSS on RA. BP and pulse stable. See flow sheet. Denying pain T/o shift. Participated in therapy. CGA x 1 with gait belt and front wheel walker. Assessment complete. No new issues today.

## 2022-05-24 NOTE — CARE COORDINATION
Team Conference held this AM. Team discussed DC date for 5/28/22. SW met with Pt at bedside and discussed date. Pt agreed for SW to call her son, Lucy Friedman, to also inform. SW called and spoke with Lucy Friedman - he is also in agreement with date and plan. SW will arrange skilled Home Care and and needed DME. Will follow.      Isabel Curiel [place]  Case Management  [phone]

## 2022-05-24 NOTE — PROGRESS NOTES
Department of Physical Medicine & Rehabilitation  Progress Note    Patient Identification:  Evan Palomino  [id number]  : 1940  Admit date: 2022    Chief Complaint: Acute cerebrovascular accident (CVA) ([address]    Subjective:   Feeling very tired this AM. States she hasn't slept well in the last couple days. Encouraged to stay awake through therapy today so that she can sleep well tonight. She states she was about to see a sleep therapist before all this happened for insomnia. Will place prn sleep meds. Team conference today. ROS: No f/c, n/v, cp     Objective:  Patient Vitals for the past 24 hrs:   BP Temp Temp src Pulse Resp SpO2   22 0941 129/64 -- -- 74 -- 95 %   22 0758 (!) 147/55 98.1 °F (36.7 °C) Oral 56 16 95 %   221 (!) 129/46 98.1 °F (36.7 °C) Oral 62 16 92 %   22 1230 126/66 -- -- (!) 45 18 94 %     Const: Alert. WDWN. No distress  Eyes: Conjunctiva noninjected, no icterus noted; pupils equal, round, and reactive to light. HENT: Atraumatic, normocephalic; Oral mucosa moist  Neck: Trachea midline, neck supple. No thyromegaly noted. CV: Regular rate and rhythm, no murmur rub or gallop noted  Resp: Lungs clear to auscultation bilaterally, no rales wheezes or ronchi, no retractions. Respirations unlabored. GI: Soft, nontender, nondistended. Normal bowel sounds. No palpable masses. Skin: Normal temperature and turgor. No rashes or breakdown noted. Ext: No significant edema appreciated. No varicosities. MSK: No joint tenderness, erythema, warmth noted. AROM intact. Neuro: RUE 4/5, RLE 4/5  -Mental status: Alert. Oriented to person, place, time, situation. 3 word immediate and delayed recall (sock, bed, blue) intact. Attention intact (months of year in reverse).    -Language: Speech fluent, repetition and naming intact  -Cranial nerves: VFF, PERRL, EOMI, Facial sensation intact, Face symmetric, Hearing intact, Palate elevation symmetric, Shoulder shrug intact. Tongue midline.   -Sensation intact to light touch. -Motor examination reveals normal strength in all four limbs diffusely.   -No abnormalities with finger/nose noted. -Reflexes 2+ and symmetric. Negative Margot  Psych: Stable mood, normal judgement, normal affect     Laboratory data: Available via EMR. Last 24 hour lab  No results found for this or any previous visit (from the past 24 hour(s)). Therapy progress:  PT  Position Activity Restriction  Other position/activity restrictions: up with assistance  Objective     Sit to Stand: Contact guard assistance  Stand to sit: Contact guard assistance  Device: Rolling Walker  Assistance: Stand by assistance  Distance: 400'  OT  PT Equipment Recommendations  Equipment Needed: No  Other: Pt owns RW/rollator/cane  Toilet - Technique: Ambulating  Equipment Used: Grab bars  Assessment        SLP          Body mass index is 27.82 kg/m². Assessment and Plan:  1. 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 Dysphagia- ALP evaluation MBS failed in the past  3. Dysarthria- SLP evaluation   4. Right apical lung nodule- found on CT- 3 month follow up   5. Schizophrenia- continue home medications    . Start Prozac 10mg qd. Has taken in the past with good results. Team conference was held today on the patient and discussed directly with the patient utilizing their entire treatment team. Please see separate team note for details. Total treatment time for today's care >35 min. >50% of time spent counseling with patient and coordinating care.        Impairments- Decreased functional mobility, Decreased ADLs     Impairments: Decreased functional mobility, Decreased ADLs     Bladder - high risk retention - Monitor PVRs, SC prn >300cc     Bowel - high risk constipation - colace BID, PRN miralax and MoM. follow bowel movements.  Enema or suppository if needed.      Safety - fall precautions     PPx  DVT: lovenox  GI: pantoprazole     FULL CODE     Rehab Progress: Improving  Anticipated Dispo: home  Services/DME: TBD  ELOS: TBD    Heydi Garcia MD PGY3      This patient has been seen, examined, and discussed with the resident. This note has been altered to reflect my own examination findings, impression, and recommendations.      John Brown D.O. M.P.H  PM&R  5/24/2022  12:38 PM

## 2022-05-24 NOTE — PLAN OF CARE
Problem: Discharge Planning  Goal: Discharge to home or other facility with appropriate resources  Outcome: Progressing     Problem: Safety - Adult  Goal: Free from fall injury  Outcome: Progressing  Note: Pt remains free of falls thus far this shift. All fall precautions in place and functioning properly. Problem: Nutrition Deficit:  Goal: Optimize nutritional status  Outcome: Progressing  Note: Patient continues to have a good appetite and eat adequately.

## 2022-05-24 NOTE — PLAN OF CARE
Problem: Safety - Adult  Goal: Free from fall injury  5/24/2022 0145 by Loly Huynh RN  Note: Remains free from falls. Call light within reach and alarms in use at all times.  Calls appropriately for assist.

## 2022-05-24 NOTE — PROGRESS NOTES
Occupational Therapy  Facility/Department: [facility] ACUTE REHAB UNIT  Rehabilitation Occupational Therapy Daily Treatment Note    Date: 22  Patient Name: Louann Murray       Room: 8233/5184-64  MRN: [id number]  Account: [de-identified]   : 1940  (77 y.o.) Gender: female                    Past Medical History:  has no past medical history on file. Past Surgical History:   has no past surgical history on file. Restrictions  Restrictions/Precautions: Seizure; Fall Risk  Other position/activity restrictions: up with assistance    Subjective     Restrictions/Precautions: Seizure; Fall Risk             Objective     Cognition  Overall Cognitive Status: Exceptions  Arousal/Alertness: Delayed responses to stimuli  Following Commands: Follows one step commands with increased time; Follows multistep commands with increased time  Attention Span: Difficulty attending to directions  Memory: Decreased short term memory  Safety Judgement: Decreased awareness of need for assistance;Decreased awareness of need for safety  Problem Solving: Assistance required to generate solutions  Insights: Decreased awareness of deficits  Initiation: Requires cues for some  Sequencing: Requires cues for some  Cognition Comment: Pt w/ more delayed initiation and processing this date, minimal participation in conversation, and required increased cues to initiate and sequence functional tasks. Pt perseverating on using L hand to assist R hand with tasks, required consistent education and reminders/cues throughout session to allow RUE to complete tasks on it's own. Little carryover noted and pt continues to use LUE to assist RUE w/ tasks by completing self-HOHA when unnecessary  Orientation  Overall Orientation Status: Within Functional Limits  Orientation Level: Oriented X4 (oriented to month but not exact day)         ADL  Grooming/Oral Hygiene  Assistance Level: Stand by assist;Verbal cues; Increased time to complete  Skilled Clinical Factors: Pt completed hand hygiene in stance at sink using self-HOHA to wash R hand; VCs to initiate task and sequence using soap and to dry hands off w/ paper towels before walking away from sink  Toileting  Assistance Level: Contact guard assist;Verbal cues; Increased time to complete  Skilled Clinical Factors: Pt continent of bladder and bowel, completed front and rear pericare in stance w/ Min A for thoroughness of rear pericare d/t large amount of stool. Pt required VCs to use RUE to assist with pulling up pants  Toilet Transfers  Technique:  (ambulating w/ RW)  Equipment: Raised toilet seat with arms  Additional Factors: Verbal cues;Cues for hand placement  Assistance Level: Stand by assist  Skilled Clinical Factors: VCs to push up from RTS arms w/ both hands vs. pulling on RW w/ LUE          Functional Mobility  Device: Rolling walker  Activity: To/From bathroom; To/From therapy gym  Assistance Level: Contact guard assist  Skilled Clinical Factors: CGA-SBA, slow pace, decreased step length and height; Pt closing her eyes during ambulation d/t fatigue and bumping into obstacles  Transfers  Surface: From chair with arms; To chair with arms  Additional Factors: Hand placement cues  Device: Walker (RW)  Sit to Stand  Assistance Level: Stand by assist  Skilled Clinical Factors: Pt required VCs to push up from chair w/ BUEs and to refrain from using L hand to place R hand on RW  Stand to Sit  Assistance Level: Stand by assist  Skilled Clinical Factors: VCs to reach back for surface w/ BUEs   OT Exercises  Exercise Treatment: Pt completed the following seated BUE therex to increase strength and endurance for ADLs and functional mobility. Pt completed 2 sets x 10 reps of each w/ 1# dowel deniz: elbow flexion (VCs to move through full ROM), shoulder protraction (VCs to maintain shoulder flexion while completing chest press w/ deniz), and shoulder flexion (only able to achieve approx.  80 degrees w/ visual target to lift the bar to that height). Pt required extended rest breaks between each set, verbal/tactile cues for positioning R hand on bar and correct technique, and encouragement to move through full ROM and put forth best effort. Pt w/ decreased participation in therex d/t fatigue. Gross Motor: Pt completed the following activity seated at tabletop to address RUE GM coordination. Pt instructed to  Greg cards off a deck and sort into 4 piles based on color. Pt demonstrated difficulty shifting a single card off the top of the deck and frequently slid several cards off the deck. Pt primarily used pincer grasp between thumb and index finger to  the card however occasionally compensated for decreased finger dexterity by sliding card to edge of table for easier grasp. Pt rotated wrist and supinated forearm to flip cards over and place on appropriate piles, although unable to achieve full supination when cued to turn card fully over prior to placing on pile. Pt dropped approx. 25% of cards when transferring to piles d/t decreased pinch strength. Pt required increased time to complete, was frequently closing her eyes, and req VCs to progress through task and  next card. Assessment  Assessment  Assessment: Pt limited by increased fatigue this date and frequently closing eyes throughout session. Pt also w/ more delayed initiation and processing and required increased VCs for sequencing functional tasks. Pt continues to required consistent verbal/tactile cues to avoid over-compensating w/ using LUE to assist R hand w/ performing tasks. Pt benefits from continued skilled OT to maximize independence and safety with functional tasks, cont OT per POC.   Activity Tolerance: Patient limited by fatigue;Patient limited by endurance  Discharge Recommendations: 24 hour supervision or assist;Home with Home health OT  Factors Affecting Discharge: Pt stated her family is interested in d/c to the same SNF her  is currently at, however pt would prefer to d/c home. OT Equipment Recommendations  Other: cont to assess  Safety Devices  Safety Devices in place: Yes  Type of devices: Left in chair;Chair alarm in place;Call light within reach;Nurse notified    Patient Education  Education  Education Given To: Patient  Education Provided: Plan of Care;Role of Therapy;Transfer Training; Safety;ADL Function  Education Provided Comments: Allowing RUE to perform tasks independently w/o automatically using LUE to assist, safety with transfers, importance of staying awake during the day to improve sleep patterns  Education Method: Verbal;Demonstration  Barriers to Learning: Cognition  Education Outcome: Verbalized understanding;Continued education needed    Plan  Plan  Times per Week: 5x/week, 60 mins/day  Times per Day: Daily  Current Treatment Recommendations: Strengthening;ROM;Balance training;Functional mobility training; Endurance training;Gait training;Cognitive reorientation; Neuromuscular re-education; Safety education & training;Self-Care / ADL; Patient/Caregiver education & training;Home management training    Goals  Patient Goals   Patient goals : \"to get through therapy\"  Short Term Goals  Time Frame for Short term goals: 2 Weeks  Short Term Goal 1: Pt will complete UE bathing and UE dressing i'ly - ongoing  Short Term Goal 2: Pt will complete LE bathing and LE dressing Mod I - ongoing  Short Term Goal 3: Pt will complete toileting, including toilet transfer, Mod I - ongoing  Short Term Goal 4: Pt will demonstrate independence w/ RUE HEP to increase strength for ADLs - ongoing  Short Term Goal 5: Pt will complete standing-level grooming > 5 mins Mod I - ongoing    Therapy Time   Individual Concurrent Group Co-treatment   Time In 0900         Time Out 1000         Minutes [address] [place] [address]

## 2022-05-24 NOTE — PROGRESS NOTES
Physical Therapy  Facility/Department: [facility] ACUTE REH[facility] UNIT  Rehabilitation Physical Therapy Treatment    NAME: Erika Lee  : 1940 (80 y.o.)  MRN: [id number]  CODE STATUS: Full Code    Date of Service: 22  Chart Reviewed: Yes  Patient assessed for rehabilitation services?: Yes  Additional Pertinent Hx: Pt is a 79 yo female admitted 22 for CVA. neuro consult pending; barium swallow: pending; Brain MRI:  infarct in the left thalamocapsular junction, No intracranial hemorrhage; chest XR: (-) PTX/effusion; CTA head/neck: (-) occlusion/flow limiting stenosis; PMH: COPD, schizophrenia  Family / Caregiver Present: No  Referring Practitioner: DO Mai  Diagnosis: CVA  General Comment  Comments: Pt educated on plan of care, transfer training, and gait training. Restrictions:  Restrictions/Precautions: Seizure; Fall Risk  Position Activity Restriction  Other position/activity restrictions: up with assistance     SUBJECTIVE  Subjective: Pt seated in recliner upon approach and reporting fatigue but agreable to PT. Denies Pain. OBJECTIVE   Functional Mobility  Bed mobility  Sit to Supine: Minimal assistance (completed with HOB flat , assist for L LE into bed)  Bed Mobility Comments: step by step VC for sequencing of log roll  Transfers  Sit to Stand: Stand by assistance  Stand to sit: Stand by assistance  Comment: completed at recliner/chair/EOB with RW; VC/ tactile cues for hand placement all transfers; recurrent reminders for use of R UE without assist from L UE; one instance of verbal safety cue required d/t pt slowly initiating sitting in middle of gym post backwards steps because she thought she was close to a chair  Balance  Posture: Fair (kyphosis)  Standing - Static: Fair (CGA without RW)  Standing - Dynamic: Fair (CGA for ambulation with RW)  Comments: supervision for seated balance while seate upright in chair and assist to doff old shirt/bernice new shirt d/t spilling luch on shirt.  2x10 forwards/lateral taps completed B LE with first round completed on 6\" step with UL UE support and second round completed on 4\" step with out UE support (ocassional reaching for right rail with instability)- CGA throughout with exception of 1 instance of min d/t posterior lean during L lateral taps. Toileting completed at end of session with supervision for static seated balance on commode and CGA without UE support for brief/pants management using B UEs. Environmental Mobility  Ambulation  Surface: level tile  Device: Rolling Walker  Assistance: Stand by assistance  Quality of Gait: decreased B LE step height/length with decreased heel strike, decreased R LE clearance - temporary improvement in response to VC;  path deviation with visual cues required to correct, forward flexed posture, downward gaze- VC for obstacle avoidance  Gait Deviations: Deviated path; Slow Kaelyn;Decreased step height;Decreased step length  Distance: 60', small distances in gym,  Comments: VC for forward gaze/upright posture, obstacle avoidance, and increased step height/length. More Ambulation?: Yes  Ambulation 2  Surface - 2: level tile  Device 2: No device  Assistance 2: Contact guard assistance  Quality of Gait 2: same as above with decreased B step height (and increased difficulty increasing step height), decreased arm swing, and slight B trunk sway, small shuffling steps backwards  Distance: 10'x3, 60', 15' forwards/backwards/lateral steps ea direction  Stairs/Curb  Stairs?: Yes  Stairs  # Steps : 6  Stairs Height: 6\"  Rails: Bilateral  Device: No Device  Assistance: Contact guard assistance  Comment: step to pattern, slow and effortful, min VC for sequencing.  Limited by fatigue         ASSESSMENT       Activity Tolerance  Activity Tolerance: Patient limited by fatigue;Patient limited by endurance  Activity Tolerance Comments: Pt very fatigued with pt asking if she can go to bed throughout session and requiring encoragement to continue in session    Assessment  Assessment: Pt continues to be fatigued this session however with decreased instance of nodding off during activities compared to yesterday. Trials ambulation without RW for first time since admission and completes with CGA. Continues to require SBA for amb with RW. Pt would benefit from cotninued skilled PT in order to progress R LE strength, functional mobility, and independence. Treatment Diagnosis: decreased functional mobility  Therapy Prognosis: Good  Decision Making: Medium Complexity  Barriers to Learning: decreased short term memory  Discharge Recommendations: 24 hour supervision or assist;Home with Home health PT  PT D/C Equipment  Other: Pt owns RW/rollator/cane  PT Equipment Recommendations  Equipment Needed: No  Other: Pt owns RW/rollator/cane    CLINICAL IMPRESSION   Pt continues to be fatigued this session however with decreased instance of nodding off during activities compared to yesterday. Trials ambulation without RW for first time since admission and completes with CGA. Continues to require SBA for amb with RW. Pt would benefit from cotninued skilled PT in order to progress R LE strength, functional mobility, and independence.     GOALS  Patient Goals   Patient goals : to return home  Long Term Goals  Time Frame for Long term goals : 2 weeks- all ongoing  Long term goal 1: Pt will complete bed mobility with mod I (pt owns hospital bed)  Long term goal 2: Pt will complete sit<>stand transfers with mod I and LRAD  Long term goal 3: Pt will complete 150' ambulation with mod I and LRAD  Long term goal 4: Pt will complete 5 steps with B handrails and supervision  Long term goal 5: Pt will complete car transfer with mod I and LRAD    PLAN OF CARE  Frequency: 1-2 treatment sessions per day, 5-7 days per week  Plan  Plan:  (60 min, 5 days a week)  Current Treatment Recommendations: Strengthening;ROM;Balance training;Functional mobility training;Transfer training; Endurance training;Gait training;Stair training  Safety Devices  Type of Devices: Call light within reach;Nurse notified; Chair alarm in place; Left in chair  Restraints  Restraints Initially in Place: No    Therapy Time   Individual Concurrent Group Co-treatment   Time In 1240         Time Out 1340         Minutes 60           Timed Code Treatment Minutes: [facility], [address] [place] [address]

## 2022-05-25 LAB
ANION GAP SERPL CALCULATED.3IONS-SCNC: 9 MMOL/L (ref 3–16)
BASOPHILS ABSOLUTE: 0.1 K/UL (ref 0–0.2)
BASOPHILS RELATIVE PERCENT: 0.8 %
BUN BLDV-MCNC: 25 MG/DL (ref 7–20)
CALCIUM SERPL-MCNC: 9.3 MG/DL (ref 8.3–10.6)
CHLORIDE BLD-SCNC: 104 MMOL/L (ref 99–110)
CO2: 26 MMOL/L (ref 21–32)
CREAT SERPL-MCNC: 0.8 MG/DL (ref 0.6–1.2)
EOSINOPHILS ABSOLUTE: 0.3 K/UL (ref 0–0.6)
EOSINOPHILS RELATIVE PERCENT: 2.5 %
GFR AFRICAN AMERICAN: >60
GFR NON-AFRICAN AMERICAN: >60
GLUCOSE BLD-MCNC: 115 MG/DL (ref 70–99)
HCT VFR BLD CALC: 32.3 % (ref 36–48)
HEMOGLOBIN: 10.7 G/DL (ref 12–16)
LYMPHOCYTES ABSOLUTE: 3.4 K/UL (ref 1–5.1)
LYMPHOCYTES RELATIVE PERCENT: 29.3 %
MCH RBC QN AUTO: 33.9 PG (ref 26–34)
MCHC RBC AUTO-ENTMCNC: 33 G/DL (ref 31–36)
MCV RBC AUTO: 102.8 FL (ref 80–100)
MONOCYTES ABSOLUTE: 0.8 K/UL (ref 0–1.3)
MONOCYTES RELATIVE PERCENT: 6.5 %
NEUTROPHILS ABSOLUTE: 7.1 K/UL (ref 1.7–7.7)
NEUTROPHILS RELATIVE PERCENT: 60.9 %
PDW BLD-RTO: 13.4 % (ref 12.4–15.4)
PLATELET # BLD: 234 K/UL (ref 135–450)
PMV BLD AUTO: 10.3 FL (ref 5–10.5)
POTASSIUM REFLEX MAGNESIUM: 4.1 MMOL/L (ref 3.5–5.1)
RBC # BLD: 3.14 M/UL (ref 4–5.2)
SODIUM BLD-SCNC: 139 MMOL/L (ref 136–145)
WBC # BLD: 11.6 K/UL (ref 4–11)

## 2022-05-25 PROCEDURE — 1280000000 HC REHAB R&B

## 2022-05-25 PROCEDURE — 97530 THERAPEUTIC ACTIVITIES: CPT

## 2022-05-25 PROCEDURE — 97116 GAIT TRAINING THERAPY: CPT

## 2022-05-25 PROCEDURE — 97130 THER IVNTJ EA ADDL 15 MIN: CPT

## 2022-05-25 PROCEDURE — 6370000000 HC RX 637 (ALT 250 FOR IP): Performed by: SURGERY

## 2022-05-25 PROCEDURE — 97535 SELF CARE MNGMENT TRAINING: CPT

## 2022-05-25 PROCEDURE — 36415 COLL VENOUS BLD VENIPUNCTURE: CPT

## 2022-05-25 PROCEDURE — 92526 ORAL FUNCTION THERAPY: CPT

## 2022-05-25 PROCEDURE — 6360000002 HC RX W HCPCS: Performed by: PHYSICAL MEDICINE & REHABILITATION

## 2022-05-25 PROCEDURE — 85025 COMPLETE CBC W/AUTO DIFF WBC: CPT

## 2022-05-25 PROCEDURE — 97129 THER IVNTJ 1ST 15 MIN: CPT

## 2022-05-25 PROCEDURE — 6370000000 HC RX 637 (ALT 250 FOR IP): Performed by: PHYSICAL MEDICINE & REHABILITATION

## 2022-05-25 PROCEDURE — 80048 BASIC METABOLIC PNL TOTAL CA: CPT

## 2022-05-25 PROCEDURE — 97110 THERAPEUTIC EXERCISES: CPT

## 2022-05-25 PROCEDURE — 99232 SBSQ HOSP IP/OBS MODERATE 35: CPT | Performed by: PHYSICAL MEDICINE & REHABILITATION

## 2022-05-25 RX ADMIN — PROMETHAZINE HYDROCHLORIDE 12.5 MG: 25 TABLET ORAL at 09:31

## 2022-05-25 RX ADMIN — MISOPROSTOL 100 MCG: 100 TABLET ORAL at 21:00

## 2022-05-25 RX ADMIN — MISOPROSTOL 100 MCG: 100 TABLET ORAL at 09:34

## 2022-05-25 RX ADMIN — FUROSEMIDE 20 MG: 20 TABLET ORAL at 09:33

## 2022-05-25 RX ADMIN — MEMANTINE HYDROCHLORIDE 5 MG: 5 TABLET, FILM COATED ORAL at 09:33

## 2022-05-25 RX ADMIN — Medication 10 MG: at 20:59

## 2022-05-25 RX ADMIN — Medication 1000 UNITS: at 09:32

## 2022-05-25 RX ADMIN — SENNOSIDES AND DOCUSATE SODIUM 1 TABLET: 50; 8.6 TABLET ORAL at 09:32

## 2022-05-25 RX ADMIN — ATORVASTATIN CALCIUM 80 MG: 80 TABLET, FILM COATED ORAL at 21:00

## 2022-05-25 RX ADMIN — METOPROLOL TARTRATE 50 MG: 50 TABLET, FILM COATED ORAL at 09:32

## 2022-05-25 RX ADMIN — MEMANTINE HYDROCHLORIDE 5 MG: 5 TABLET, FILM COATED ORAL at 20:59

## 2022-05-25 RX ADMIN — LISINOPRIL 40 MG: 40 TABLET ORAL at 09:33

## 2022-05-25 RX ADMIN — FLUOXETINE 10 MG: 10 CAPSULE ORAL at 09:32

## 2022-05-25 RX ADMIN — BISACODYL 5 MG: 5 TABLET, COATED ORAL at 09:33

## 2022-05-25 RX ADMIN — ENOXAPARIN SODIUM 40 MG: 100 INJECTION SUBCUTANEOUS at 09:32

## 2022-05-25 RX ADMIN — SENNOSIDES AND DOCUSATE SODIUM 1 TABLET: 50; 8.6 TABLET ORAL at 20:59

## 2022-05-25 RX ADMIN — ASPIRIN 81 MG 81 MG: 81 TABLET ORAL at 09:33

## 2022-05-25 RX ADMIN — METOPROLOL TARTRATE 50 MG: 50 TABLET, FILM COATED ORAL at 21:00

## 2022-05-25 RX ADMIN — PROMETHAZINE HYDROCHLORIDE 12.5 MG: 25 TABLET ORAL at 19:12

## 2022-05-25 ASSESSMENT — PAIN SCALES - GENERAL: PAINLEVEL_OUTOF10: 0

## 2022-05-25 NOTE — CARE COORDINATION
Pt's son came and visited this afternoon. Pt and son are now wanting short SNF placement at [facility] as Pt's spouse resides at this SNF. Pt has Medicare so no Precert needed. SW called and spoke with Admissions at [facility] ([phone]) - they stated they were going to accept Pt when she was \"on hospital side at [facility] but then she went to ARU. \" Admissions states they are able to accept Pt and to just call when ready for DC. PIOTR will discuss with Team in Conference tomorrow regarding DC date.      Rubens Bentley [place]  Case Management  [phone]

## 2022-05-25 NOTE — PROGRESS NOTES
Department of Physical Medicine & Rehabilitation  Progress Note    Patient Identification:  Annemarie Jones  [id number]  : 1940  Admit date: 2022    Chief Complaint: Acute cerebrovascular accident (CVA) ([address]    Subjective:   Much improved today. Feeling well today. Improving in therapy. No new complaints overnight. Plan for discharge later this week. ROS: No f/c, n/v, cp     Objective:  Patient Vitals for the past 24 hrs:   BP Temp Temp src Pulse Resp SpO2   22 2130 (!) 152/61 98.2 °F (36.8 °C) Oral 64 16 93 %     Const: Alert. WDWN. No distress  Eyes: Conjunctiva noninjected, no icterus noted; pupils equal, round, and reactive to light. HENT: Atraumatic, normocephalic; Oral mucosa moist  Neck: Trachea midline, neck supple. No thyromegaly noted. CV: Regular rate and rhythm, no murmur rub or gallop noted  Resp: Lungs clear to auscultation bilaterally, no rales wheezes or ronchi, no retractions. Respirations unlabored. GI: Soft, nontender, nondistended. Normal bowel sounds. No palpable masses. Skin: Normal temperature and turgor. No rashes or breakdown noted. Ext: No significant edema appreciated. No varicosities. MSK: No joint tenderness, erythema, warmth noted. AROM intact. Neuro: RUE 4/5, RLE 4/5  -Mental status: Alert. Oriented to person, place, time, situation. 3 word immediate and delayed recall (sock, bed, blue) intact. Attention intact (months of year in reverse). -Language: Speech fluent, repetition and naming intact  -Cranial nerves: VFF, PERRL, EOMI, Facial sensation intact, Face symmetric, Hearing intact, Palate elevation symmetric, Shoulder shrug intact. Tongue midline.   -Sensation intact to light touch. -Motor examination reveals normal strength in all four limbs diffusely.   -No abnormalities with finger/nose noted. -Reflexes 2+ and symmetric. Negative Margot  Psych: Stable mood, normal judgement, normal affect     Laboratory data: Available via EMR.    Last 24 hour lab  Recent Results (from the past 24 hour(s))   Basic Metabolic Panel w/ Reflex to MG    Collection Time: 05/25/22  6:22 AM   Result Value Ref Range    Sodium 139 136 - 145 mmol/L    Potassium reflex Magnesium 4.1 3.5 - 5.1 mmol/L    Chloride 104 99 - 110 mmol/L    CO2 26 21 - 32 mmol/L    Anion Gap 9 3 - 16    Glucose 115 (H) 70 - 99 mg/dL    BUN 25 (H) 7 - 20 mg/dL    CREATININE 0.8 0.6 - 1.2 mg/dL    GFR Non-African American >60 >60    GFR African American >60 >60    Calcium 9.3 8.3 - 10.6 mg/dL   CBC auto differential    Collection Time: 05/25/22  6:22 AM   Result Value Ref Range    WBC 11.6 (H) 4.0 - 11.0 K/uL    RBC 3.14 (L) 4.00 - 5.20 M/uL    Hemoglobin 10.7 (L) 12.0 - 16.0 g/dL    Hematocrit 32.3 (L) 36.0 - 48.0 %    .8 (H) 80.0 - 100.0 fL    MCH 33.9 26.0 - 34.0 pg    MCHC 33.0 31.0 - 36.0 g/dL    RDW 13.4 12.4 - 15.4 %    Platelets 638 137 - 270 K/uL    MPV 10.3 5.0 - 10.5 fL    Neutrophils % 60.9 %    Lymphocytes % 29.3 %    Monocytes % 6.5 %    Eosinophils % 2.5 %    Basophils % 0.8 %    Neutrophils Absolute 7.1 1.7 - 7.7 K/uL    Lymphocytes Absolute 3.4 1.0 - 5.1 K/uL    Monocytes Absolute 0.8 0.0 - 1.3 K/uL    Eosinophils Absolute 0.3 0.0 - 0.6 K/uL    Basophils Absolute 0.1 0.0 - 0.2 K/uL       Therapy progress:  PT  Position Activity Restriction  Other position/activity restrictions: up with assistance  Objective     Sit to Stand: Stand by assistance  Stand to sit: Stand by assistance  Device: Rolling Walker  Assistance: Stand by assistance  Distance: 60', small distances in gym,  OT  PT Equipment Recommendations  Equipment Needed: No  Other: Pt owns RW/rollator/cane  Toilet - Technique: Ambulating  Equipment Used: Grab bars  Assessment        SLP          Body mass index is 27.82 kg/m².     Assessment and Plan:  1. 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 Dysphagia- ALP evaluation MBS failed in the past  3. Dysarthria- SLP evaluation   4. Right apical lung nodule- found on CT- 3 month follow up   5. Schizophrenia- continue home medications    . Start Prozac 10mg qd. Has taken in the past with good results.          Impairments- Decreased functional mobility, Decreased ADLs     Impairments: Decreased functional mobility, Decreased ADLs     Bladder - high risk retention - Monitor PVRs, SC prn >300cc     Bowel - high risk constipation - colace BID, PRN miralax and MoM. follow bowel movements.  Enema or suppository if needed.      Safety - fall precautions     PPx  DVT: lovenox  GI: pantoprazole     FULL CODE     Rehab Progress: Improving  Anticipated Dispo: home  Services/DME: [facility]  EL[facility]: [facility]  PM&R  5/25/2022  10:27 AM

## 2022-05-25 NOTE — PROGRESS NOTES
Physical Therapy  Facility/Department: [facility]  Rehabilitation Physical Therapy Initial Assessment    NAME: Jack Rajan  : 1940 (3 y.o.)  MRN: [id number]  CODE STATUS: Full Code    Date of Service: 22  Chart Reviewed: Yes  Patient assessed for rehabilitation services?: Yes  Additional Pertinent Hx: Pt is a 81 yo female admitted 22 for CVA. neuro consult pending; barium swallow: pending; Brain MRI:  infarct in the left thalamocapsular junction, No intracranial hemorrhage; chest XR: (-) PTX/effusion; CTA head/neck: (-) occlusion/flow limiting stenosis; PMH: COPD, schizophrenia  Family / Caregiver Present: No  Referring Practitioner: DO Mai  Diagnosis: CVA  General Comment  Comments: Pt educated on plan of care, benefits of SCI FIT, transfer training, and gait training. Restrictions:  Restrictions/Precautions: Seizure; Fall Risk  Position Activity Restriction  Other position/activity restrictions: up with assistance     SUBJECTIVE  Subjective: Pt seated in recliner upon approach and reporting fatigue but agreable to PT. Denies Pain. OBJECTIVE   Functional Mobility  Bed mobility  Sit to Supine: Minimal assistance (HOB flat , assist for R LE)  Bed Mobility Comments: step by step VC for sequencing of log roll  Transfers  Sit to Stand: Stand by assistance;Minimal Assistance (SBA with exception of one instance of min from low sofa)  Stand to sit: Stand by assistance  Comment: completed at recliner/chair/EOB with RW; VC/ tactile cues for hand placement all transfers; recurrent reminders for use of R UE without assist from L UE  Balance  Posture: Fair (kyphosis)  Standing - Static: Fair;+ (SBA without RW)  Standing - Dynamic: Fair;- (SBA for ambulation with RW)  Comments: Pt compeltes x 10 B LE marches without UE support and CGA- decreased AAROM noted throughout reps.     Environmental Mobility  Ambulation  Surface: level tile;uneven;ramp  Device: Rolling Walker  Assistance: Stand by assistance;Contact guard assistance (SBA with exception of CGA for ramp ambulation)  Quality of Gait: decreased B LE step height/length with decreased heel strike, decreased R LE clearance - temporary improvement in response to VC;  path deviation with visual cues required to correct, forward flexed posture, downward gaze- VC for obstacle avoidance  Gait Deviations: Deviated path; Slow Kaelyn;Decreased step height;Decreased step length  Distance: 300' (includes navigation of tight spaces in dining room), 225' (including ascent/descent of 70' ramp), 90'  Comments: VC for forward gaze/upright posture, obstacle avoidance, and increased step height/length. More Ambulation?: Yes  Ambulation 2  Surface - 2: level tile  Device 2: No device  Assistance 2: Contact guard assistance  Quality of Gait 2: same as above with decreased B step height (improved ability to increase step height in response to cues compared to previous treatment), decreased arm swing, and slight B trunk sway  Distance: 60'    PT Exercises  Exercise Equipment: SCI FIT completed for  x 1 min 30 sec + 2 min 15 seconds with B UEs and LEs with AROM (ocassional AAROM to encourage increased AROM/muscle activation from pt). Max VC/encouragement required to keep patient engaged in activity d/t fatigue with pt limited at end of activity by nausea. Pt unable to reach high encugh speed to turn on SCI FIT screen however appears to be moving at increased pace/ through increased AROM compared to previous completion. ASSESSMENT       Activity Tolerance  Activity Tolerance: Patient limited by fatigue;Patient limited by endurance; Patient tolerated treatment well  Activity Tolerance Comments: pt limited by nausea with SCI FIT activity, reports fatigue throughout session with extended seated rest required    Assessment  Assessment: Pt continues to be limited by fatigue, also limited by nausea during SCI FIT activity - RN notified.  Pt continues to require VC for obstacle aviodance during ambulation especially when navigating tight spaces in dining room, as well as recurrent VC for increased step height. Pt continues to require SBA for amb with RW and CGA for amb without device. Pt would benefit from continued skilled PT in order to progress functional mobility and independence. Treatment Diagnosis: decreased functional mobility  Therapy Prognosis: Good  Decision Making: Medium Complexity  Barriers to Learning: decreased short term memory  Discharge Recommendations: 24 hour supervision or assist;Home with Home health PT  PT D/C Equipment  Other: Pt owns RW/rollator/cane  PT Equipment Recommendations  Equipment Needed: No  Other: Pt owns RW/rollator/cane    CLINICAL IMPRESSION   Pt continues to be limited by fatigue, also limited by nausea during SCI FIT activity - RN notified. Pt continues to require VC for obstacle aviodance during ambulation especially when navigating tight spaces in dining room, as well as recurrent VC for increased step height. Pt continues to require SBA for amb with RW and CGA for amb without device. Pt would benefit from continued skilled PT in order to progress functional mobility and independence. GOALS  Patient Goals   Patient goals : to return home  Long Term Goals  Time Frame for Long term goals : 2 weeks- all ongoing  Long term goal 1: Pt will complete bed mobility with mod I (pt owns hospital bed)  Long term goal 2: Pt will complete sit<>stand transfers with mod I and LRAD  Long term goal 3: Pt will complete 150' ambulation with mod I and LRAD  Long term goal 4: Pt will complete 5 steps with B handrails and supervision  Long term goal 5: Pt will complete car transfer with mod I and LRAD    PLAN OF CARE  Frequency: 1-2 treatment sessions per day, 5-7 days per week  Plan  Plan:  (60 min, 5 days a week)  Current Treatment Recommendations: Strengthening;ROM;Balance training;Functional mobility training;Transfer training; Endurance training;Gait training;Stair training  Safety Devices  Type of Devices: Call light within reach;Nurse notified; Chair alarm in place; Left in chair  Restraints  Restraints Initially in Place: No      Therapy Time   Individual Concurrent Group Co-treatment   Time In  1045         Time Out  1145         Minutes  60           Total Minutes: [address] PT, [place] [phone]

## 2022-05-25 NOTE — PLAN OF CARE
Problem: Safety - Adult  Goal: Free from fall injury  5/25/2022 0330 by Jessica Jarquin RN  Note: Remains free from falls. Call light within reach and alarms in use at all times. Calls appropriately for assist. Supervision for transfers. Needs partial assist getting out of bed and into bed.

## 2022-05-25 NOTE — PROGRESS NOTES
Occupational Therapy  Occupational Therapy  Daily Treatment Note  Patient Name: Taj Reina  MRN: [id number]    Chart Reviewed: Yes     Restrictions/Precautions: Seizure,Fall Risk Other position/activity restrictions: up with assistance     Additional Pertinent Hx: Pt is a 79 yo female admitted 5/16/22 for CVA. neuro consult pending; barium swallow: pending; Brain MRI:  infarct in the left thalamocapsular junction, No intracranial hemorrhage; chest XR: (-) PTX/effusion; CTA head/neck: (-) occlusion/flow limiting stenosis; PMH: COPD, schizophrenia      Diagnosis: CVA  Treatment Diagnosis: impaired ADL, mobility, and RUE functional use d/t CVA    Subjective: Pt seated in recliner upon entry, pleasant and agreeable to ADL shower/therapy session. General Comment: Pt sit to stand SBA and ambulated SBA with rw to bathroom toilet ~18 ft. Pt toilet transfer SBA and toileted SBA. Pt shower transfer SBA. Pt with extended time to shower with vcs and encouragement to use RUE (pt relies on LUE to move RUE even though pt with AROM RUE). Pt CGA to bathe UB, with this writer holding out LUE so pt can wash without pt having LUE holding on to right hand. Pt washed LB CGA in stance for buttocks and LHS to wash both feet. Pt donned shirt and sports bra (needing Min A to pull down sports bra). Pt threaded BLES into brief/hospital pants with vcs for independence. Pt SBA in stance to pull up. Pt Dependent to don  socks, attempted use of sock aid but due to pt using LUE to assist RUE, not successful even with vcs/tactile cues. Pt stating, \"My grandson puts my socks on me. \" Pt ambulated back to recliner SBA with rw, stand to sit SBA. Call light in reach and chair alarm on.     Pain: Denies pain, pt with nausea EOS and RN notified, entered and assessed    Social/Functional History  Lives With: Family (grandson)  Type of Home: House  Home Layout: Two level,Able to Live on Main level with bedroom/bathroom,Performs ADL's on one with pt full time, granddaughter comes over most of the day every day.  is currently in [address]    Objective:    Cognition/Orientation:Ox4, increased time to command follow with some vcs for initiation and sequencing, decreased awareness of need for assistance and safety, assistance to generate solutions, delayed processing    Bed mobility - N/A  Rolling:  Supine to sit:   Sit to Supine:   Scooting:    Functional Mobility   Sit to Stand:SBA  Stand to Sit:SBA  Bed to Chair Transfer: N/A  Commode Transfer:SBA  Other: shower SBA    ADLs   Grooming: setup/SBA to brush hair seated in shower on TTB  Bathing: UB - CGA to hold LUE so pt would use RUE to wash LUE, CGA for buttocks in stance and  LHS to wash feet  UB dressing: bra/shirt - Min A to pull down sports bra in back  LB dressing: SBA and extended time/vcs  Toileting: SBA  Other:    UE Exercises - N/A    Activity Tolerance: Pt tolerated therapy session well, pt fatigued at EOS and nauseous EOS      Patient Education: ADL techniqes, POC, role of OT, transfer training and engaging RUE in ALDs  Safety Devices in Place: chair alarm on and call light in reach    Assessment: Pt with shower level ADL this date, extended time for ADLs and pt requires some vcs for initiation and sequencing. Pt continues to use LUE to move/assist RUE although pt has AROM of RUE, this requires vcs and gentle holding of LUE to encourage pt use RUE. Pt required CGA for bathing,  SBA LB dressing, Dependent for footwear and Min A for UB dressing. Pt continues to benefit from intensive inpt therapy to maximize functional independence and safety. Continue with POC.       Discharge Recommendations: 24 hr assist/supervision and HHOT  Equipment Needs:  Continue to assess    Goals:  Short Term Goals  Time Frame for Short term goals: 2 Weeks  Short Term Goal 1: Pt will complete UE bathing and UE dressing i'ly - ongoing  Short Term Goal 2: Pt will complete LE bathing and LE dressing Mod I - ongoing  Short Term Goal 3: Pt will complete toileting, including toilet transfer, Mod I - ongoing  Short Term Goal 4: Pt will demonstrate independence w/ RUE HEP to increase strength for ADLs - ongoing  Short Term Goal 5: Pt will complete standing-level grooming > 5 mins Mod I - ongoing         Plan:      Times per Week: 5x/week, 60 mins/day   Times per Day: Daily    Therapy Time:   Individual Concurrent Group Co-treatment   Time In  830         Time Out  930         Minutes  60         Timed Code Treatment Minutes:  60  Total Treatment Minutes:  60  If patient is discharged prior to next treatment, this note will serve as the discharge summary.     Elyssa Leal, [address]

## 2022-05-25 NOTE — PROGRESS NOTES
Speech Language Pathology  ACUTE REHAB UNIT  SPEECH/LANGUAGE PATHOLOGY      [x] Daily  [] Weekly Care Conference Note  [] Discharge    Patient:Andria Flowres      :1940  [facility]:[id number]  Rehab Dx/Hx: Acute cerebrovascular accident (CVA) ([facility] [address] [I63.9]    Precautions: [] Aspiration  [x] Fall risk  [] Sternal  [] Seizure [] Hip  [] Weight Bearing [] Other     ST Dx: [] Aphasia  [x] Dysarthria  [] Apraxia   [x] Oropharyngeal dysphagia [x] Cognitive Impairment  [] Other:   Date of Admit: 2022  Room #: 3106/3106-01  Date: 2022          Current Diet Order:ADULT DIET; Dysphagia - Soft and Bite Sized  ADULT ORAL NUTRITION SUPPLEMENT; Breakfast, Dinner; Standard High Calorie/High Protein Oral Supplement   Recommended Form of Meds: Meds in puree  Compensatory Swallowing Strategies : Eat/Feed slowly,Upright as possible for all oral intake,Remain upright for 30-45 minutes after meals,Small bites/sips,Check for pocketing of food on the Right,Alternate solids and liquids,Set up assist,Lingual sweep        Subjective: Pt seated upright in chair agreeable to SLP evaluation at this time.   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  Leisure & Hobbies: crossword puzzles, reading, watching TV    Dentition: Dentures top,Dentures bottom  Brandon Tapia 63: Impaired  Vision Exceptions: Wears glasses for reading  Hearing  Hearing: Exceptions to [facility]  Hearing Exceptions: Hard of hearing/hearing concerns (reports she needs to get hearing aids)  Barriers toward progress: Cognitive deficit        Date: 2022      Tx session 1 Tx Session 2   Total Timed Code Min 25 0   Total Treatment Minutes 30 30   Individual Treatment Minutes 30 30   Group Treatment Minutes 0 0   Co-Treat Minutes 0 0   Brief Exception: N/A N/A   Pain None expressed None expressed   Pain Intervention: [] RN notified  [] Repositioned  [] Intervention offered and patient declined  [x] N/A  [] Other: [] RN notified  [] Repositioned  [] Intervention offered and patient declined  [x] N/A  [] Other:   Subjective:     Pt seated upright in chair agreeable to be seen for tx session. Pt expressed nausea at start of session, RN aware and giving meds. Pt seated upright in chair agreeable to SLP tx at this time. Objective / Goals:       Goal 1: Pt will consume least restrictive diet with adequate oral clearance via use of strategies and no s/s penetration/aspiration. Not directly targeted this session. Pt provided with trial of regular solids. Pt demonstrated adequate use of small bites for mastication. Oral clearance with trace-no residue across 85% of meal. Min verbal cues to pause and alternate solids/liquids and lingual sweep to clear mild residue remaining on R lingual surface. No s/s penetration/aspiration. Goal 2: The patient will improve oral preparation phase via bolus control/manipulation exercises to 5/5 each trial. Not directly targeted this session. Not directly targeted this session. Goal 3: The patient will improve oral motor function via therapeutic oral motor exercises to 5/5 each trial. Completed x10 OM exercises with increased effort/strength. Not directly targeted this session. The patient/caregiver will demonstrate understanding of compensatory strategies for improved swallowing safety. Not directly targeted this session. Discussed strategies prior to PO intake. Pt communicated x4 strategies with 100% accuracy given MI via increased wait time. Goal 1: Pt will complete problem solving tasks given min cues with 80% accuracy. Answered basic problem solving questions related to safety when d/c'd home. Pt communicated appropriate answers with 90% accuracy. Not directly targeted this session.    Goal 2: Pt will utilize compensatory strategies for recall given min cues with 80% accuracy. Pt completed N-back with 2 factor with 93% accuracy. Pt assisted with writing x3 ADLs completed this AM. Pt repeated for encoding. Following 10 min delay recalled x2/3, increasing to x3/3 given min verbal cue. Not directly targeted this session. Other areas targeted:     Education:   Educated re: strategies for encoding, exercises for OM strength. Educated re: swallow strategies with trials regular solids. Safety Devices: [x] Call light within reach  [x] Chair alarm activated and connected to nurse call light system  [] Bed alarm activated   [] Other: [x] Call light within reach  [] Chair alarm activated and connected to nurse call light system  [x] Bed alarm activated   [] Other:   Assessment:  . Mild cognitive impairments in the areas of memory, problem solving, and attention. Mild oral phase dysphagia with mild oral residue post swallow and cues required for using strategies to assist with safety of swallow and oral clearance of trials regular solids this date. Plan:  Continue as per POC.       Interventions used this date:  [] Speech/Language Treatment  [] Instruction in HEP  [x] Dysphagia Treatment [x] Cognitive Treatment   [] Other:     Discharge recommendations:  [] Home independently  [x] Home with assistance []  24 hour supervision  [] ECF [] Other  Continued Tx Upon Discharge: ? [] Yes    [] No    [x] TBD based on progress while on ARU     [] Vital Stim indicated     [] Other:   Estimated discharge date: 5/28/22      Electronically signed by:  Loly Short M.A., [address] [place] [address]

## 2022-05-26 PROCEDURE — 6370000000 HC RX 637 (ALT 250 FOR IP): Performed by: SURGERY

## 2022-05-26 PROCEDURE — 97530 THERAPEUTIC ACTIVITIES: CPT

## 2022-05-26 PROCEDURE — 99232 SBSQ HOSP IP/OBS MODERATE 35: CPT | Performed by: PHYSICAL MEDICINE & REHABILITATION

## 2022-05-26 PROCEDURE — 97110 THERAPEUTIC EXERCISES: CPT

## 2022-05-26 PROCEDURE — 97116 GAIT TRAINING THERAPY: CPT

## 2022-05-26 PROCEDURE — 97535 SELF CARE MNGMENT TRAINING: CPT

## 2022-05-26 PROCEDURE — 1280000000 HC REHAB R&B

## 2022-05-26 PROCEDURE — 97130 THER IVNTJ EA ADDL 15 MIN: CPT

## 2022-05-26 PROCEDURE — 92526 ORAL FUNCTION THERAPY: CPT

## 2022-05-26 PROCEDURE — 6360000002 HC RX W HCPCS: Performed by: PHYSICAL MEDICINE & REHABILITATION

## 2022-05-26 PROCEDURE — 6370000000 HC RX 637 (ALT 250 FOR IP): Performed by: PHYSICAL MEDICINE & REHABILITATION

## 2022-05-26 PROCEDURE — 97129 THER IVNTJ 1ST 15 MIN: CPT

## 2022-05-26 RX ADMIN — ASPIRIN 81 MG 81 MG: 81 TABLET ORAL at 08:41

## 2022-05-26 RX ADMIN — MEMANTINE HYDROCHLORIDE 5 MG: 5 TABLET, FILM COATED ORAL at 08:41

## 2022-05-26 RX ADMIN — PROMETHAZINE HYDROCHLORIDE 12.5 MG: 25 TABLET ORAL at 08:15

## 2022-05-26 RX ADMIN — Medication 10 MG: at 21:49

## 2022-05-26 RX ADMIN — ENOXAPARIN SODIUM 40 MG: 100 INJECTION SUBCUTANEOUS at 08:40

## 2022-05-26 RX ADMIN — SENNOSIDES AND DOCUSATE SODIUM 1 TABLET: 50; 8.6 TABLET ORAL at 08:41

## 2022-05-26 RX ADMIN — LISINOPRIL 40 MG: 40 TABLET ORAL at 08:41

## 2022-05-26 RX ADMIN — METOPROLOL TARTRATE 50 MG: 50 TABLET, FILM COATED ORAL at 08:41

## 2022-05-26 RX ADMIN — Medication 150 MG: at 19:30

## 2022-05-26 RX ADMIN — MISOPROSTOL 100 MCG: 100 TABLET ORAL at 08:41

## 2022-05-26 RX ADMIN — ATORVASTATIN CALCIUM 80 MG: 80 TABLET, FILM COATED ORAL at 21:50

## 2022-05-26 RX ADMIN — FLUOXETINE 10 MG: 10 CAPSULE ORAL at 08:40

## 2022-05-26 RX ADMIN — METOPROLOL TARTRATE 50 MG: 50 TABLET, FILM COATED ORAL at 21:49

## 2022-05-26 RX ADMIN — FUROSEMIDE 20 MG: 20 TABLET ORAL at 08:41

## 2022-05-26 RX ADMIN — MISOPROSTOL 100 MCG: 100 TABLET ORAL at 21:50

## 2022-05-26 RX ADMIN — Medication 1000 UNITS: at 08:40

## 2022-05-26 RX ADMIN — BISACODYL 5 MG: 5 TABLET, COATED ORAL at 08:41

## 2022-05-26 RX ADMIN — MEMANTINE HYDROCHLORIDE 5 MG: 5 TABLET, FILM COATED ORAL at 21:50

## 2022-05-26 ASSESSMENT — PAIN SCALES - GENERAL
PAINLEVEL_OUTOF10: 0
PAINLEVEL_OUTOF10: 0

## 2022-05-26 NOTE — PROGRESS NOTES
Physical Therapy  Facility/Department: [facility] ACUTE REHAB UNIT  Rehabilitation Physical Therapy Treatment Note     NAME: Ciro Osei  : 1940 (3 y.o.)  MRN: [id number]  CODE STATUS: Full Code    Date of Service: 22  Chart Reviewed: Yes  Patient assessed for rehabilitation services?: Yes  Additional Pertinent Hx: Pt is a 79 yo female admitted 22 for CVA. neuro consult pending; barium swallow: pending; Brain MRI:  infarct in the left thalamocapsular junction, No intracranial hemorrhage; chest XR: (-) PTX/effusion; CTA head/neck: (-) occlusion/flow limiting stenosis; PMH: COPD, schizophrenia  Family / Caregiver Present: No  Referring Practitioner: DO Mai  Diagnosis: CVA  General Comment  Comments: Pt educated on plan of care, benefits of SCI FIT, transfer training, and gait training. Restrictions:  Restrictions/Precautions: Seizure; Fall Risk  Position Activity Restriction  Other position/activity restrictions: up with assistance     OBJECTIVE     Functional Mobility  Bed mobility  Sit to Supine: Minimal assistance (Assist for R LE into bed , HOB flat)  Scooting:  Moderate assistance (to scoot up inbed using hook lying position)  Bed Mobility Comments: step by step VC for sequencing of log roll  Transfers  Sit to Stand: Stand by assistance  Stand to sit: Stand by assistance;Contact guard assistance (SBA with exception if 1 instance of CGA with safety cues required d/t pt reaching back for chair before safely positioned)  Comment: completed at recliner/chair/EOB with RW; VC/ tactile cues for hand placement all transfers; recurrent reminders for use of R UE without assist from L UE  Balance  Posture: Fair (kyphosis)  Sitting - Static: Good;- (sueprvision without UE support EOB)  Sitting - Dynamic: Fair;+ (SBA without UE support EOB)  Standing - Static: Fair;+ (SBA without RW)  Standing - Dynamic: Fair;- (SBA for ambulation with RW)  Comments: Pt compeltes forwards/lateral B LE taps x 10 each direction onto airex pad with CGA throughout with exception of 1 instance of min A during L LE taps. Decreased foot clearance noted with recurrent VC for increased step height required. Environmental Mobility  Ambulation  Surface: level tile  Device: Rolling Walker  Assistance: Stand by assistance  Quality of Gait: decreased B LE step height/length with decreased heel strike, decreased R LE clearance - temporary improvement in response to VC;  path deviation with visual cues required to correct, forward flexed posture, downward gaze- VC for obstacle avoidance  Distance: 185', small distances in gym, 60'  Comments: Recurrent VC for forward gaze/upright posture, obstacle avoidance, and increased step height/length. Very fatigued asking \"Can I go lay down now? \" throughout ambulation  Stairs  # Steps : 6 (+ 3)  Stairs Height: 6\"  Rails: Bilateral  Device: No Device  Assistance: Contact guard assistance  Comment: step to pattern, slow and effortful, min VC for sequencing. Limited by fatigue         ASSESSMENT       Activity Tolerance  Activity Tolerance: Patient limited by fatigue;Patient limited by endurance; Patient tolerated treatment well  Activity Tolerance Comments: Pt repeatedly asks \"Can I go lay down now? \" throughout session    Assessment  Assessment: Pt demonstrates decreased tolerance to ambulation this date with decreased ambulation distance d/t fatigue. Pt asks \"can I go lay down now? \" repeatedly throughout session. Pt continues to require SBA for amb with RW with recurrent VC for increased foot clearance and obstacle avoidance. Pt would benefit from continued skilled PT in order to progress funcitonal mobility, R sided strength, and independence.   Treatment Diagnosis: decreased functional mobility  Therapy Prognosis: Good  Decision Making: Medium Complexity  Barriers to Learning: decreased short term memory  Discharge Recommendations: 24 hour supervision or assist;Home with Home health PT  PT D/C Equipment  Other: Pt owns RW/rollator/cane  PT Equipment Recommendations  Equipment Needed: No  Other: Pt owns RW/rollator/cane    CLINICAL IMPRESSION   Pt demonstrates decreased tolerance to ambulation this date with decreased ambulation distance d/t fatigue. Pt asks \"can I go lay down now? \" repeatedly throughout session. Pt continues to require SBA for amb with RW with recurrent VC for increased foot clearance and obstacle avoidance. Pt would benefit from continued skilled PT in order to progress funcitonal mobility, R sided strength, and independence. GOALS  Patient Goals   Patient goals : to return home  Long Term Goals  Time Frame for Long term goals : 2 weeks- all ongoing  Long term goal 1: Pt will complete bed mobility with mod I (pt owns hospital bed)  Long term goal 2: Pt will complete sit<>stand transfers with mod I and LRAD  Long term goal 3: Pt will complete 150' ambulation with mod I and LRAD  Long term goal 4: Pt will complete 5 steps with B handrails and supervision  Long term goal 5: Pt will complete car transfer with mod I and LRAD    PLAN OF CARE  Frequency: 1-2 treatment sessions per day, 5-7 days per week  Plan  Plan:  (60 min, 5 days a week)  Current Treatment Recommendations: Strengthening;ROM;Balance training;Functional mobility training;Transfer training; Endurance training;Gait training;Stair training  Safety Devices  Type of Devices: Call light within reach;Nurse notified; Left in bed;Bed alarm in place  Restraints  Restraints Initially in Place: No      Therapy Time   Individual Concurrent Group Co-treatment   Time In  1100         Time Out  1200         Minutes  60           Total Minutes: 60 min         [facility] PT, [place] [id number]

## 2022-05-26 NOTE — PROGRESS NOTES
Occupational Therapy  Facility/Department: [facility] ACUTE REHAB UNIT  Rehabilitation Occupational Therapy Daily Treatment Note    Date: 22  Patient Name: Erika Lee       Room: 8048/5501-36  MRN: [id number]  Account: [de-identified]   : 1940  (77 y.o.) Gender: female                    Past Medical History:  has no past medical history on file. Past Surgical History:   has no past surgical history on file. Restrictions  Restrictions/Precautions: Seizure; Fall Risk  Other position/activity restrictions: up with assistance    Subjective  Subjective: Pt asleep in bed requiring encouragment for waking up for therapy session but cooperative throughout. Pt with nausea after activity. RN aware  Restrictions/Precautions: Seizure; Fall Risk      Objective     ADL  Grooming/Oral Hygiene  Assistance Level: Stand by assist;Verbal cues; Increased time to complete  Skilled Clinical Factors: Pt completing denture care in stance at sink removing dentures brushing and rinsin with increased time and VCs for next steps. Pt requesting to sit for rest break then washing face and hands in stance with VCs for each step. Reminders required for turning water on and off. Upper Extremity Dressing  Assistance Level: Minimal assistance (Pt doffing sweatshirt able to remover from arms however pulling partially over head requiring assist and VCs for removal from head. Pt donning bra with Min A, increased time and VCs. clasps on bra done by therapist prior to donning.)  Lower Extremity Dressing  Assistance Level: Minimal assistance (donning briefs and pants with ++increased time and VCs for orientation, problem solving and next steps. Pt often putting both hands togther during task requiring VCs for seperating hands for more efficient tech.)  Putting On/Taking Off Footwear  Assistance Level:  Moderate assistance (++increased time donning socks with Max A and shoes with Mod A Pt introduced to long handle shoe horn but unable to use at this time)  Toileting  Assistance Level: Contact guard assist;Increased time to complete;Verbal cues (heavy VCs for pulling up pants on R side. Kat care completed in stance)  Toilet Transfers  Technique: Stand step  Equipment: Raised toilet seat with arms  Assistance Level: Stand by assist;Contact guard assist  Skilled Clinical Factors: Pt requiring VCs for hand place ment and orientation to toilet seat          Functional Mobility  Device: Rolling walker  Activity: To/From bathroom (in room for retrieving shirt. pt requiring VCs for safe manuvering and CGA to SBA with VCs for increased floor clearance)  Bed Mobility  Overall Assistance Level: Minimal Assistance (VCs for step by step instructions/tech)  Supine to Sit  Assistance Level: Minimal assistance  Skilled Clinical Factors: increased time for completion with VCs for step by step instruction  Scooting  Assistance Level: Stand by assist  Skilled Clinical Factors: Scooting to EOB  Transfers  Surface: To chair with arms;From bed;Raised toilet Seat  Additional Factors: Hand placement cues  Device: Walker  Sit to Stand  Assistance Level: Stand by assist;Contact guard assist  Skilled Clinical Factors: Pt easily confused with hand placement requiring VCs for each transfer. Pt frequently holding R hand with L hand requiring assist for use of hands seperatly  Stand to Sit  Assistance Level: Stand by assist;Contact guard assist   OT Exercises  Exercise Treatment: Pt completing reaching task with RUE for increased functional use and strengthening. Pt requiring hand over hand assist for maintaining L hand on knee while completing task with R hand. Pt grasping yellow thera cube from table on R side and placing in therapist hand requiring pt to look for target and reach to place and release grasp. Then retrieving cube and placing back on table.  This sequence was completed 5 times with increased  time and heavy VCs for looking for target and stabilization of L hand on pts L knee.     Assessment  Assessment  Assessment: Pt requiring increased time for completion of all tasks. Pt requiring assist and VCs for seperating L and R hand for functional use. Pt frequently holding R hand with left. Dressing tasks completed seated in recliner chair with heavy VCs for tech, orientation and sequencing task. Pt continues to benefit from inpt OT. Continue OT per Diane 41. Activity Tolerance: Patient limited by fatigue;Patient limited by endurance; Patient tolerated treatment well  Discharge Recommendations: 24 hour supervision or assist;Home with Home health OT  OT Equipment Recommendations  Other: cont to assess  Safety Devices  Safety Devices in place: Yes  Type of devices: Left in chair;Nurse notified;Call light within reach; Chair alarm in place;Gait belt    Patient Education  Education  Education Given To: Patient  Education Provided: Role of Therapy;Plan of Care;ADL Function;Mobility Training;Transfer Training  Education Method: Verbal;Demonstration  Barriers to Learning: Cognition  Education Outcome: Verbalized understanding;Continued education needed    Plan If pt discharges prior to next treatment, this note will serve as discharge summary  Plan  Times per Week: 5x/week, 60 mins/day  Times per Day: Daily  Current Treatment Recommendations: Strengthening;ROM;Balance training;Functional mobility training; Endurance training;Gait training;Cognitive reorientation; Neuromuscular re-education; Safety education & training;Self-Care / ADL; Patient/Caregiver education & training;Home management training    Goals (as determined and assessed by primary OT)  Patient Goals   Patient goals : \"to get through therapy\"  Short Term Goals  Time Frame for Short term goals: 2 Weeks  Short Term Goal 1: Pt will complete UE bathing and UE dressing i'ly - ongoing  Short Term Goal 2: Pt will complete LE bathing and LE dressing Mod I - ongoing  Short Term Goal 3: Pt will complete toileting, including toilet transfer, Mod I - ongoing  Short Term Goal 4: Pt will demonstrate independence w/ RUE HEP to increase strength for ADLs - ongoing  Short Term Goal 5: Pt will complete standing-level grooming > 5 mins Mod I - ongoing    Therapy Time   Individual Concurrent Group Co-treatment   Time In 0730         Time Out 0830         Minutes 60         Timed Code Treatment Minutes: [address], R Lauryn [address]

## 2022-05-26 NOTE — PLAN OF CARE
Problem: Safety - Adult  Goal: Free from fall injury  Outcome: Progressing  Flowsheets (Taken 5/25/2022 0251)  Free From Fall Injury: Instruct family/caregiver on patient safety

## 2022-05-26 NOTE — CARE COORDINATION
Called son and son is refusing to come for meeting concerning patient needs with therapy for going home. Son is still insisting on patient going to [address] [place] [address] at discharge and son said that it was the final plan at discharge. This CM called [facility] and left message for [facility] (admissions) [phone] to call back. This CM faxed demographics for  admissions. Awaiting call back. CM will continue to follow patient until discharge. Electronically signed by Stephen Holloway RN on 5/26/2022 at 2:08 PM     Addendum:  Precert still pending at [facility].  Electronically signed by Stephen Holloway RN on 5/26/2022 at 5:48 PM

## 2022-05-26 NOTE — PROGRESS NOTES
Department of Physical Medicine & Rehabilitation  Progress Note    Patient Identification:  Evan Palomino  [id number]  : 1940  Admit date: 2022    Chief Complaint: Acute cerebrovascular accident (CVA) ([address]    Subjective:   Feeling much more rested this morning. Looks well. No complaints. Working with therapy. Family is in discussion of SNF vs home discharge. ROS: No f/c, n/v, cp     Objective:  Patient Vitals for the past 24 hrs:   BP Temp Temp src Pulse Resp SpO2 Weight   22 0838 (!) 118/50 (!) 95.7 °F (35.4 °C) Axillary 55 18 95 % --   22 0045 114/73 -- -- -- -- -- --   22 (!) 170/56 97.7 °F (36.5 °C) Oral 58 16 93 % 169 lb (76.7 kg)     Const: Alert. WDWN. No distress  Eyes: Conjunctiva noninjected, no icterus noted; pupils equal, round, and reactive to light. HENT: Atraumatic, normocephalic; Oral mucosa moist  Neck: Trachea midline, neck supple. No thyromegaly noted. CV: Regular rate and rhythm, no murmur rub or gallop noted  Resp: Lungs clear to auscultation bilaterally, no rales wheezes or ronchi, no retractions. Respirations unlabored. GI: Soft, nontender, nondistended. Normal bowel sounds. No palpable masses. Skin: Normal temperature and turgor. No rashes or breakdown noted. Ext: No significant edema appreciated. No varicosities. MSK: No joint tenderness, erythema, warmth noted. AROM intact. Neuro: RUE 4/5, RLE 4/5  -Mental status: Alert. Oriented to person, place, time, situation. 3 word immediate and delayed recall (sock, bed, blue) intact. Attention intact (months of year in reverse). -Language: Speech fluent, repetition and naming intact  -Cranial nerves: VFF, PERRL, EOMI, Facial sensation intact, Face symmetric, Hearing intact, Palate elevation symmetric, Shoulder shrug intact. Tongue midline.   -Sensation intact to light touch.    -Motor examination reveals normal strength in all four limbs diffusely.   -No abnormalities with finger/nose noted.   -Reflexes 2+ and symmetric. Negative Margot  Psych: Stable mood, normal judgement, normal affect     Laboratory data: Available via EMR. Last 24 hour lab  No results found for this or any previous visit (from the past 24 hour(s)). Therapy progress:  PT  Position Activity Restriction  Other position/activity restrictions: up with assistance  Objective     Sit to Stand: Stand by assistance,Minimal Assistance (SBA with exception of one instance of min from low sofa)  Stand to sit: Stand by assistance  Device: Rolling Walker  Assistance: Stand by assistance,Contact guard assistance (SBA with exception of CGA for ramp ambulation)  Distance: 300' (includes navigation of tight spaces in dining room), 225' (including ascent/descent of 70' ramp), 90'  OT  PT Equipment Recommendations  Equipment Needed: No  Other: Pt owns RW/rollator/cane  Toilet - Technique: Ambulating  Equipment Used: Grab bars  Assessment        SLP          Body mass index is 29.01 kg/m². Assessment and Plan:  1. 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 Dysphagia- ALP evaluation MBS failed in the past  3. Dysarthria- SLP evaluation   4. Right apical lung nodule- found on CT- 3 month follow up   5. Schizophrenia- continue home medications    . Start Prozac 10mg qd. Has taken in the past with good results.          Impairments- Decreased functional mobility, Decreased ADLs     Impairments: Decreased functional mobility, Decreased ADLs     Bladder - high risk retention - Monitor PVRs, SC prn >300cc     Bowel - high risk constipation - colace BID, PRN miralax and MoM. follow bowel movements. Enema or suppository if needed.      Safety - fall precautions     PPx  DVT: lovenox  GI: pantoprazole     FULL CODE     Rehab Progress: Improving  Anticipated Dispo: home  Services/DME: [facility]  ELOS: 5/28    Peyton Reyes MD PGY3    This patient has been seen, examined, and discussed with the resident.  This note has been altered to reflect my own examination findings, impression, and recommendations.      Irvin Marte D.O. M.P.H  PM&R  5/26/2022  12:14 PM

## 2022-05-26 NOTE — PROGRESS NOTES
Speech Language Pathology  ACUTE REHAB UNIT  SPEECH/LANGUAGE PATHOLOGY      [x] Daily  [] Weekly Care Conference Note  [] Discharge    Patient:Andria Chavez      :1940  RJW:[id number]  Rehab Dx/Hx: Acute cerebrovascular accident (CVA) ([facility] [address] [I63.9]    Precautions: [] Aspiration  [x] Fall risk  [] Sternal  [] Seizure [] Hip  [] Weight Bearing [] Other     ST Dx: [] Aphasia  [x] Dysarthria  [] Apraxia   [x] Oropharyngeal dysphagia [x] Cognitive Impairment  [] Other:   Date of Admit: 2022  Room #: 3106/3106-01  Date: 2022          Current Diet Order:ADULT DIET; Dysphagia - Soft and Bite Sized  ADULT ORAL NUTRITION SUPPLEMENT; Breakfast, Dinner; Standard High Calorie/High Protein Oral Supplement   Recommended Form of Meds: Meds in puree  Compensatory Swallowing Strategies : Eat/Feed slowly,Upright as possible for all oral intake,Remain upright for 30-45 minutes after meals,Small bites/sips,Check for pocketing of food on the Right,Alternate solids and liquids,Set up assist,Lingual sweep        Subjective: Pt seated upright in chair agreeable to SLP evaluation at this time.   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  Leisure & Hobbies: crossword puzzles, reading, watching TV    Dentition: Dentures top,Dentures bottom  [address] 63: Impaired  Vision Exceptions: Wears glasses for reading  Hearing  Hearing: Exceptions to [facility]  Hearing Exceptions: Hard of hearing/hearing concerns (reports she needs to get hearing aids)  Barriers toward progress: Cognitive deficit        Date: 2022      Tx session 1 Tx Session 2   Total Timed Code Min 20 15   Total Treatment Minutes 30 30   Individual Treatment Minutes 30 30   Group Treatment Minutes 0 0   Co-Treat Minutes 0 0   Brief Exception: N/A N/A   Pain None expressed None expressed   Pain Intervention: [] RN notified  [] Repositioned  [] Intervention offered and patient declined  [x] N/A  [] Other: [] RN notified  [] Repositioned  [] Intervention offered and patient declined  [x] N/A  [] Other:   Subjective:     Pt seated upright in chair agreeable to be seen for tx session. Increased fatigue. Pt seated upright in chair agreeable to SLP tx at this time. Son present at start of session. Objective / Goals:       Goal 1: Pt will consume least restrictive diet with adequate oral clearance via use of strategies and no s/s penetration/aspiration. Not directly targeted this session. Observed consuming thin liquids with no overt s/s penetration/aspiration. Pt not wanting to trial regular solids, stating full from afternoon meal.      Goal 2: The patient will improve oral preparation phase via bolus control/manipulation exercises to 5/5 each trial. Not directly targeted this session. Completed x15 bolus control exercises via lingual lateralizations against resistance. Goal 3: The patient will improve oral motor function via therapeutic oral motor exercises to 5/5 each trial. Completed x20 labial protrusions and x20 lingual lateralizations/single side protrusions. Pt with decent effort, however min cues to carry over. Completed x20 single lingual lateralizations on L. Pt with increased effort/strength compared to AM session. The patient/caregiver will demonstrate understanding of compensatory strategies for improved swallowing safety. Not directly targeted this session. Stated x3 strategies by self to use during all PO intake. Demonstrated understanding and awareness via teach back. Goal 1: Pt will complete problem solving tasks given min cues with 80% accuracy. Answered safe vs. Unsafe and good vs. Bad reasons to use call button with 50% accuracy, increasing to 100% given min-mod verbal cues. Pt with increased fatigue, required mod cues to redirect and ?  Comprehend questions/scenarios for increased accuracy. Not directly targeted this session. Goal 2: Pt will utilize compensatory strategies for recall given min cues with 80% accuracy. Recalled AM OT session given min verbal cues with 85% accuracy. Recalled tasks completed during first session with SLP, son visiting and d/c date given min verbal cues to use visual aid (whiteboard) and increased wait time to allow for recall of information. Other areas targeted:     Education:   Educated re: rationale for task completion. Educated re: swallow exercises, strategies for recall. Safety Devices: [x] Call light within reach  [x] Chair alarm activated and connected to nurse call light system  [] Bed alarm activated   [] Other: [x] Call light within reach  [] Chair alarm activated and connected to nurse call light system  [x] Bed alarm activated   [] Other:   Assessment:  . Mild cognitive impairments in the areas of memory, problem solving, and attention. Mild oral dysphagia, however progressing with use of strategies and adequate carry over with exercises. Plan:  Continue as per POC.       Interventions used this date:  [] Speech/Language Treatment  [] Instruction in HEP  [x] Dysphagia Treatment [x] Cognitive Treatment   [] Other:     Discharge recommendations:  [] Home independently  [x] Home with assistance []  24 hour supervision  [] ECF [] Other  Continued Tx Upon Discharge: ? [] Yes    [] No    [x] TBD based on progress while on ARU     [] Vital Stim indicated     [] Other:   Estimated discharge date: 5/28/22    Electronically signed by:  Qing Preciado M.A., [address] [place] [address]

## 2022-05-26 NOTE — PROGRESS NOTES
Comprehensive Nutrition Assessment    RECOMMENDATIONS:  1. PO Diet: Continue dysphagia soft and bite sized diet per SLP recs  2. ONS: Continue Ensure Enlive BID     NUTRITION ASSESSMENT:   Nutritional summary & status: Follow-up. Pt w/ previously improving po intake averaging >50% meals and ONS, however pt noted to have skipped lunch yesterday d/t nausea. Wt steady through adm. Pt out of room and w/ other staff members during attempted encounters. Will continue to monitor per [facility].  Admission/PMH: Stroke; PMH: HTN, HLD, bipolar disorder, schizophrenia, current smoker    MALNUTRITION ASSESSMENT  Context of Malnutrition: Acute Illness   Malnutrition Status: At risk for malnutrition (Comment)  Findings of the 6 clinical characteristics of malnutrition (Minimum of 2 out of 6 clinical characteristics is required to make the diagnosis of moderate or severe Protein Calorie Malnutrition based on AND/ASPEN Guidelines):  Energy Intake: Mild decrease in energy intake (comment)   Energy Intake Time: 2 days     NUTRITION DIAGNOSIS   Inadequate oral intake related to  (nausea) as evidenced by intake 0-25%    NUTRITION INTERVENTION  Food and/or Nutrient Delivery:  Continue Current Diet,Continue Oral Nutrition Supplement  Nutrition Education/Counseling:  No recommendation at this time   Goals:  Pt will consume and tolerate >50% of all meals and ONS throughout adm. Nutrition Monitoring and Evaluation:   Food/Nutrient Intake Outcomes:  Food and Nutrient Intake,Supplement Intake  Physical Signs/Symptoms Outcomes:  Biochemical Data,GI Status,Weight     OBJECTIVE DATA: Significant to nutrition assessment  · Nutrition-Focused Physical Findings: lbm 5/24;  · Labs: Reviewed;   · Meds: Reviewed; senna, Vit D, lasix, dulcolax, prn glycolax   · Wounds: None       CURRENT NUTRITION THERAPIES  ADULT DIET;  Dysphagia - Soft and Bite Sized  ADULT ORAL NUTRITION SUPPLEMENT; Breakfast, Dinner; Standard High Calorie/High Protein Oral Supplement    PO Intake: 51-75%   PO Supplement Intake:51-75%  Additional Sources of Calories/IVF:n/a     ANTHROPOMETRICS  Current Height: 5' 4\" (162.6 cm)  Current Weight: 169 lb (76.7 kg)    Admission weight: 161 lb 9.6 oz (73.3 kg)  Ideal Body Weight (IBW): 120 lbs  (55 kg)    Usual Bodyweight  (YAMILETH)   Weight Changes: wt fluctuations r/t diuretic tx       BMI: 29.1    Wt Readings from Last 50 Encounters:   05/25/22 169 lb (76.7 kg)   05/16/22 166 lb 3.2 oz (75.4 kg)     COMPARATIVE STANDARDS  Energy (kcal):  7539-7938 (20-25)     Protein (g):  65-75 (1.2-1.4)       Fluid (ml/day):  >1500mL    The patient will still be monitored per nutrition standards of care. Consult dietitian if nutrition interventions essential to patient care is needed.      [place] [address], [address] [place] [address]  [place]:  [phone]  Office:  [phone]

## 2022-05-26 NOTE — PROGRESS NOTES
Alert x4, denies pain /discomfort vitals stable, took all hs meds without difficulty, in bed with eyes close, resp E/E call light in reach

## 2022-05-27 VITALS
HEART RATE: 61 BPM | OXYGEN SATURATION: 97 % | DIASTOLIC BLOOD PRESSURE: 62 MMHG | WEIGHT: 169 LBS | RESPIRATION RATE: 16 BRPM | SYSTOLIC BLOOD PRESSURE: 152 MMHG | TEMPERATURE: 97.3 F | BODY MASS INDEX: 28.85 KG/M2 | HEIGHT: 64 IN

## 2022-05-27 LAB
ANION GAP SERPL CALCULATED.3IONS-SCNC: 11 MMOL/L (ref 3–16)
BASOPHILS ABSOLUTE: 0.1 K/UL (ref 0–0.2)
BASOPHILS RELATIVE PERCENT: 0.7 %
BUN BLDV-MCNC: 20 MG/DL (ref 7–20)
CALCIUM SERPL-MCNC: 9.5 MG/DL (ref 8.3–10.6)
CHLORIDE BLD-SCNC: 103 MMOL/L (ref 99–110)
CO2: 25 MMOL/L (ref 21–32)
CREAT SERPL-MCNC: 0.7 MG/DL (ref 0.6–1.2)
EOSINOPHILS ABSOLUTE: 0.2 K/UL (ref 0–0.6)
EOSINOPHILS RELATIVE PERCENT: 2.1 %
GFR AFRICAN AMERICAN: >60
GFR NON-AFRICAN AMERICAN: >60
GLUCOSE BLD-MCNC: 102 MG/DL (ref 70–99)
HCT VFR BLD CALC: 33.2 % (ref 36–48)
HEMOGLOBIN: 10.9 G/DL (ref 12–16)
LYMPHOCYTES ABSOLUTE: 3.6 K/UL (ref 1–5.1)
LYMPHOCYTES RELATIVE PERCENT: 35.9 %
MCH RBC QN AUTO: 33.9 PG (ref 26–34)
MCHC RBC AUTO-ENTMCNC: 32.8 G/DL (ref 31–36)
MCV RBC AUTO: 103.3 FL (ref 80–100)
MONOCYTES ABSOLUTE: 0.6 K/UL (ref 0–1.3)
MONOCYTES RELATIVE PERCENT: 6.5 %
NEUTROPHILS ABSOLUTE: 5.5 K/UL (ref 1.7–7.7)
NEUTROPHILS RELATIVE PERCENT: 54.8 %
PDW BLD-RTO: 13.3 % (ref 12.4–15.4)
PLATELET # BLD: 256 K/UL (ref 135–450)
PMV BLD AUTO: 10.3 FL (ref 5–10.5)
POTASSIUM REFLEX MAGNESIUM: 4.2 MMOL/L (ref 3.5–5.1)
RBC # BLD: 3.22 M/UL (ref 4–5.2)
SODIUM BLD-SCNC: 139 MMOL/L (ref 136–145)
WBC # BLD: 10 K/UL (ref 4–11)

## 2022-05-27 PROCEDURE — 36415 COLL VENOUS BLD VENIPUNCTURE: CPT

## 2022-05-27 PROCEDURE — 6360000002 HC RX W HCPCS: Performed by: PHYSICAL MEDICINE & REHABILITATION

## 2022-05-27 PROCEDURE — 97129 THER IVNTJ 1ST 15 MIN: CPT

## 2022-05-27 PROCEDURE — 99239 HOSP IP/OBS DSCHRG MGMT >30: CPT | Performed by: PHYSICAL MEDICINE & REHABILITATION

## 2022-05-27 PROCEDURE — 85025 COMPLETE CBC W/AUTO DIFF WBC: CPT

## 2022-05-27 PROCEDURE — 97530 THERAPEUTIC ACTIVITIES: CPT

## 2022-05-27 PROCEDURE — 97535 SELF CARE MNGMENT TRAINING: CPT

## 2022-05-27 PROCEDURE — 87635 SARS-COV-2 COVID-19 AMP PRB: CPT

## 2022-05-27 PROCEDURE — 97130 THER IVNTJ EA ADDL 15 MIN: CPT

## 2022-05-27 PROCEDURE — 97116 GAIT TRAINING THERAPY: CPT

## 2022-05-27 PROCEDURE — 6370000000 HC RX 637 (ALT 250 FOR IP): Performed by: PHYSICAL MEDICINE & REHABILITATION

## 2022-05-27 PROCEDURE — 92526 ORAL FUNCTION THERAPY: CPT

## 2022-05-27 PROCEDURE — 80048 BASIC METABOLIC PNL TOTAL CA: CPT

## 2022-05-27 RX ORDER — METOPROLOL TARTRATE 50 MG/1
50 TABLET, FILM COATED ORAL 2 TIMES DAILY
Qty: 60 TABLET | Refills: 3
Start: 2022-05-27

## 2022-05-27 RX ORDER — MECOBALAMIN 5000 MCG
10 TABLET,DISINTEGRATING ORAL NIGHTLY
Qty: 60 TABLET | Refills: 1
Start: 2022-05-27

## 2022-05-27 RX ORDER — POLYETHYLENE GLYCOL 3350 17 G/17G
17 POWDER, FOR SOLUTION ORAL DAILY PRN
Qty: 527 G | Refills: 1
Start: 2022-05-27 | End: 2022-06-26

## 2022-05-27 RX ORDER — SENNA AND DOCUSATE SODIUM 50; 8.6 MG/1; MG/1
1 TABLET, FILM COATED ORAL 2 TIMES DAILY
Qty: 60 TABLET | Refills: 1
Start: 2022-05-27

## 2022-05-27 RX ORDER — HYDRALAZINE HYDROCHLORIDE 10 MG/1
10 TABLET, FILM COATED ORAL 3 TIMES DAILY PRN
Qty: 90 TABLET | Refills: 3
Start: 2022-05-27

## 2022-05-27 RX ORDER — FLUOXETINE 10 MG/1
10 CAPSULE ORAL DAILY
Qty: 30 CAPSULE | Refills: 3
Start: 2022-05-28

## 2022-05-27 RX ORDER — LISINOPRIL 40 MG/1
40 TABLET ORAL DAILY
Qty: 30 TABLET | Refills: 3
Start: 2022-05-28

## 2022-05-27 RX ADMIN — SENNOSIDES AND DOCUSATE SODIUM 1 TABLET: 50; 8.6 TABLET ORAL at 08:51

## 2022-05-27 RX ADMIN — Medication 1000 UNITS: at 08:48

## 2022-05-27 RX ADMIN — MEMANTINE HYDROCHLORIDE 5 MG: 5 TABLET, FILM COATED ORAL at 08:48

## 2022-05-27 RX ADMIN — FLUOXETINE 10 MG: 10 CAPSULE ORAL at 08:47

## 2022-05-27 RX ADMIN — LISINOPRIL 40 MG: 40 TABLET ORAL at 08:47

## 2022-05-27 RX ADMIN — FUROSEMIDE 20 MG: 20 TABLET ORAL at 08:48

## 2022-05-27 RX ADMIN — ASPIRIN 81 MG 81 MG: 81 TABLET ORAL at 08:47

## 2022-05-27 RX ADMIN — BISACODYL 5 MG: 5 TABLET, COATED ORAL at 08:51

## 2022-05-27 RX ADMIN — METOPROLOL TARTRATE 50 MG: 50 TABLET, FILM COATED ORAL at 08:47

## 2022-05-27 RX ADMIN — ENOXAPARIN SODIUM 40 MG: 100 INJECTION SUBCUTANEOUS at 08:49

## 2022-05-27 RX ADMIN — HYDROCODONE BITARTRATE AND ACETAMINOPHEN 1 TABLET: 5; 325 TABLET ORAL at 15:50

## 2022-05-27 RX ADMIN — MISOPROSTOL 100 MCG: 100 TABLET ORAL at 08:49

## 2022-05-27 RX ADMIN — HYDROCODONE BITARTRATE AND ACETAMINOPHEN 1 TABLET: 5; 325 TABLET ORAL at 09:44

## 2022-05-27 ASSESSMENT — PAIN DESCRIPTION - DESCRIPTORS: DESCRIPTORS: ACHING

## 2022-05-27 ASSESSMENT — PAIN - FUNCTIONAL ASSESSMENT
PAIN_FUNCTIONAL_ASSESSMENT: ACTIVITIES ARE NOT PREVENTED
PAIN_FUNCTIONAL_ASSESSMENT: ACTIVITIES ARE NOT PREVENTED

## 2022-05-27 ASSESSMENT — PAIN DESCRIPTION - ORIENTATION: ORIENTATION: LEFT

## 2022-05-27 ASSESSMENT — PAIN SCALES - GENERAL
PAINLEVEL_OUTOF10: 5
PAINLEVEL_OUTOF10: 6

## 2022-05-27 ASSESSMENT — PAIN DESCRIPTION - LOCATION: LOCATION: HEAD

## 2022-05-27 NOTE — DISCHARGE INSTR - COC
Continuity of Care Form    Patient Name: Kathya Jose   :  1940  MRN:  [id number]    Admit date:  2022  Discharge date:  2022    Code Status Order: Full Code   Advance Directives:      Admitting Physician:  Carol Ann Elias DO  PCP: No primary care provider on file. Discharging Nurse: [facility] Unit/Room#: 3106/3106-01  Discharging Unit Phone Number: [phone]    Emergency Contact:   Extended Emergency Contact Information  Primary Emergency Contact: April Lewis, [address] [facility] Phone: [phone]  Relation: Child  Preferred language: English   needed? No  Secondary Emergency Contact: Mattie [address] Phone: [phone]  Mobile Phone: [phone]  Relation: Grandchild    Past Surgical History:  No past surgical history on file. Immunization History: There is no immunization history on file for this patient. Active Problems:  Patient Active Problem List   Diagnosis Code    Thalamic stroke (HCC) I63.9    Primary hypertension I10    Smoking F17.200    Bipolar disorder ([address] F31.9    Acute cerebrovascular accident (CVA) ([address] I63.9    Acute ischemic stroke ([facility] [address] I63.9       Isolation/Infection:   Isolation            No Isolation          Patient Infection Status       None to display            Nurse Assessment:  Last Vital Signs: BP (!) 184/50   Pulse 55   Temp 97.3 °F (36.3 °C) (Oral)   Resp 16   Ht 5' 4\" (1.626 m)   Wt 169 lb (76.7 kg)   SpO2 97%   BMI 29.01 kg/m²     Last documented pain score (0-10 scale): Pain Level: 0  Last Weight:   Wt Readings from Last 1 Encounters:   22 169 lb (76.7 kg)     Mental Status:  oriented and alert    IV Access:  - None    Nursing Mobility/ADLs:  Walking   Assisted  Transfer  Assisted  Bathing  Assisted  Dressing  Assisted  Toileting  Assisted  Feeding  Independent  Med Admin  Assisted  Med Delivery   whole    Wound Care Documentation and Therapy:        Elimination:  Continence:    Bowel: Yes  Bladder: Yes  Urinary Catheter: None   Colostomy/Ileostomy/Ileal Conduit: No       Date of Last BM: 5/24/22  No intake or output data in the 24 hours ending 05/27/22 0944  I/O last 3 completed shifts: In: 240 [P.O.:240]  Out: -     Safety Concerns: At Risk for Falls    Impairments/Disabilities:      Speech    Nutrition Therapy:  Current Nutrition Therapy:   - Oral Diet:  Soft and bite sized    Routes of Feeding: Oral  Liquids: Thin Liquids  Daily Fluid Restriction: no  Last Modified Barium Swallow with Video (Video Swallowing Test): 5/17/22    Treatments at the Time of Hospital Discharge:   Respiratory Treatments: n/a  Oxygen Therapy:  is not on home oxygen therapy.   Ventilator:    - No ventilator support    Rehab Therapies: Physical Therapy, Occupational Therapy, and Speech/Language Therapy  Weight Bearing Status/Restrictions: No weight bearing restrictions  Other Medical Equipment (for information only, NOT a DME order):  walker  Other Treatments: n/a    Patient's personal belongings (please select all that are sent with patient):  Glasses, Dentures upper and lower    RN SIGNATURE:  Electronically signed by Amador Howard RN on 5/27/22 at 2:28 PM EDT    CASE MANAGEMENT/SOCIAL WORK SECTION    Inpatient Status Date: ***    Readmission Risk Assessment Score:  Readmission Risk              Risk of Unplanned Readmission:  19           Discharging to Facility/ Agency   Name: [facility]  Address:  Phone: [phone]  Fax:    Dialysis Facility (if applicable)   Name:  Address:  Dialysis Schedule:  Phone:  Fax:    / signature: Electronically signed by LESLI Mac on 5/27/22 at 9:44 AM EDT    PHYSICIAN SECTION    Prognosis: Good    Condition at Discharge: Stable    Rehab Potential (if transferring to Rehab): Good    Recommended Labs or Other Treatments After Discharge: PT/OT/SLP    Physician Certification: I certify the above information and transfer of Radha Freeman  is

## 2022-05-27 NOTE — PROGRESS NOTES
Called report to [facility]. [address] faxed. All belongings are packed up waiting on transport.

## 2022-05-27 NOTE — PROGRESS NOTES
Department of Physical Medicine & Rehabilitation  Progress Note    Patient Identification:  Camilo Cordova  [id number]  : 1940  Admit date: 2022    Chief Complaint: Acute cerebrovascular accident (CVA) ([address]    Subjective:   No acute events overnight. Patient seen and examined at been side. Patient reports she is feeling good today, improving. Patient denies shortness of breath, chest pain, chills, nausea, vomiting She denies urinary frequency, dysuria. Patient is hemodynamically stable and afebrile. Family decided SNF for discharge, pending precert. ROS: No f/c, n/v, cp     Objective:  Patient Vitals for the past 24 hrs:   BP Temp Temp src Pulse Resp SpO2   22 0846 (!) 184/50 97.3 °F (36.3 °C) Oral 55 16 97 %   22 2145 (!) 156/65 98.3 °F (36.8 °C) Oral 60 18 94 %     Const: Alert. WDWN. No distress  Eyes: Conjunctiva noninjected, no icterus noted; pupils equal, round, and reactive to light. HENT: Atraumatic, normocephalic; Oral mucosa moist  Neck: Trachea midline, neck supple. No thyromegaly noted. CV: Regular rate and rhythm, no murmur rub or gallop noted  Resp: Lungs clear to auscultation bilaterally, no rales wheezes or ronchi, no retractions. Respirations unlabored. GI: Soft, nontender, nondistended. Normal bowel sounds. No palpable masses. Skin: Normal temperature and turgor. No rashes or breakdown noted. Ext: No significant edema appreciated. No varicosities. MSK: No joint tenderness, erythema, warmth noted. AROM intact. Neuro: RUE 4/5, RLE 4/5  -Mental status: Alert. Oriented to person, place, time, situation. 3 word immediate and delayed recall (sock, bed, blue) intact. Attention intact (months of year in reverse). -Language: Speech fluent, repetition and naming intact  -Cranial nerves: VFF, PERRL, EOMI, Facial sensation intact, Face symmetric, Hearing intact, Palate elevation symmetric, Shoulder shrug intact. Tongue midline.   -Sensation intact to light touch. -Motor examination reveals normal strength in all four limbs diffusely.   -No abnormalities with finger/nose noted. -Reflexes 2+ and symmetric. Negative Margot  Psych: Stable mood, normal judgement, normal affect     Laboratory data: Available via EMR.    Last 24 hour lab  Recent Results (from the past 24 hour(s))   Basic Metabolic Panel w/ Reflex to MG    Collection Time: 05/27/22  6:22 AM   Result Value Ref Range    Sodium 139 136 - 145 mmol/L    Potassium reflex Magnesium 4.2 3.5 - 5.1 mmol/L    Chloride 103 99 - 110 mmol/L    CO2 25 21 - 32 mmol/L    Anion Gap 11 3 - 16    Glucose 102 (H) 70 - 99 mg/dL    BUN 20 7 - 20 mg/dL    CREATININE 0.7 0.6 - 1.2 mg/dL    GFR Non-African American >60 >60    GFR African American >60 >60    Calcium 9.5 8.3 - 10.6 mg/dL   CBC auto differential    Collection Time: 05/27/22  6:22 AM   Result Value Ref Range    WBC 10.0 4.0 - 11.0 K/uL    RBC 3.22 (L) 4.00 - 5.20 M/uL    Hemoglobin 10.9 (L) 12.0 - 16.0 g/dL    Hematocrit 33.2 (L) 36.0 - 48.0 %    .3 (H) 80.0 - 100.0 fL    MCH 33.9 26.0 - 34.0 pg    MCHC 32.8 31.0 - 36.0 g/dL    RDW 13.3 12.4 - 15.4 %    Platelets 657 514 - 654 K/uL    MPV 10.3 5.0 - 10.5 fL    Neutrophils % 54.8 %    Lymphocytes % 35.9 %    Monocytes % 6.5 %    Eosinophils % 2.1 %    Basophils % 0.7 %    Neutrophils Absolute 5.5 1.7 - 7.7 K/uL    Lymphocytes Absolute 3.6 1.0 - 5.1 K/uL    Monocytes Absolute 0.6 0.0 - 1.3 K/uL    Eosinophils Absolute 0.2 0.0 - 0.6 K/uL    Basophils Absolute 0.1 0.0 - 0.2 K/uL       Therapy progress:  PT  Position Activity Restriction  Other position/activity restrictions: up with assistance  Objective     Sit to Stand: Stand by assistance  Stand to sit: Stand by assistance,Contact guard assistance (SBA with exception if 1 instance of CGA with safety cues required d/t pt reaching back for chair before safely positioned)  Device: [address] [facility]: Stand by assistance  Distance: 185', small distances in gym, 61'  OT  PT Equipment Recommendations  Equipment Needed: No  Other: Pt owns RW/rollator/cane  Toilet - Technique: Ambulating  Equipment Used: Grab bars  Assessment        SLP          Body mass index is 29.01 kg/m². Assessment and Plan:  1. 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 Dysphagia- ALP evaluation MBS failed in the past  3. Dysarthria- SLP evaluation   4. Right apical lung nodule- found on CT- 3 month follow up   5. Schizophrenia- continue home medications   Continue Prozac 10mg qd. Has taken in the past with good results.           Impairments: Decreased functional mobility, Decreased ADLs     Bladder - high risk retention - Monitor PVRs, SC prn >300cc     Bowel - high risk constipation - colace BID, PRN miralax and MoM. follow bowel movements.  Enema or suppository if needed.      Safety - fall precautions     PPx  DVT: lovenox  GI: pantoprazole     FULL CODE     Rehab Progress: Improving  Anticipated Dispo: home  Services/DME: [facility]  ELOS: 5/28    Nohelia Rodríguez MD PGY1  5/27/2022  10:27 AM

## 2022-05-27 NOTE — PROGRESS NOTES
Speech Language Pathology  ACUTE REHAB UNIT  SPEECH/LANGUAGE PATHOLOGY      [x] Daily  [] Weekly Care Conference Note  [x] Discharge    Patient:Andria Marinelli      :1940  SGS:[id number]  Rehab Dx/Hx: Acute cerebrovascular accident (CVA) ([facility] [address] [I63.9]    Precautions: [] Aspiration  [x] Fall risk  [] Sternal  [] Seizure [] Hip  [] Weight Bearing [] Other     ST Dx: [] Aphasia  [x] Dysarthria  [] Apraxia   [x] Oropharyngeal dysphagia [x] Cognitive Impairment  [] Other:   Date of Admit: 2022  Room #: 3106/3106-01  Date: 2022          Current Diet Order:ADULT DIET; Dysphagia - Soft and Bite Sized  ADULT ORAL NUTRITION SUPPLEMENT; Breakfast, Dinner; Standard High Calorie/High Protein Oral Supplement   Recommended Form of Meds: Meds in puree  Compensatory Swallowing Strategies : Eat/Feed slowly,Upright as possible for all oral intake,Remain upright for 30-45 minutes after meals,Small bites/sips,Check for pocketing of food on the Right,Alternate solids and liquids,Set up assist,Lingual sweep        Subjective: Pt seated upright in chair agreeable to SLP evaluation at this time.   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  Hearing Exceptions: Hard of hearing/hearing concerns (reports she needs to get hearing aids)  Barriers toward progress: Cognitive deficit        Date: 2022       Tx session 1 Tx Session 2 Discharge Summary   Total Timed Code Min 10 15    Total Treatment Minutes 30 30    Individual Treatment Minutes 30 30    Group Treatment Minutes 0 0    Co-Treat Minutes 0 0    Brief Exception: N/A N/A    Pain None expressed None expressed    Pain Intervention: [] RN notified  [] Repositioned  [] Intervention offered and patient declined  [x] N/A  [] Other: [] RN notified  [] Repositioned  [] Intervention offered and patient declined  [x] N/A  [] Other:    Subjective:     Pt seated upright in chair agreeable to SLP tx during AM meal.  Pt seated upright in chair agreeable to SLP tx at this time. Son present. Objective / Goals:        Goal 1: Pt will consume least restrictive diet with adequate oral clearance via use of strategies and no s/s penetration/aspiration. Pt provided with trial of regular solids. Slow, yet functional, mastication noted, with main placement of bolus on R side. Pt with trace oral residue, clearing well with MI use of swallow strategies (liquid wash and lingual sweep noted). x1 cough noted prior to swallow-pt stating not bolus, rather \"mucous\". Pt with x1 occurrence of small anterior loss from R side of oral cavity and required min verbal cue to identify piece remaining on shirt. Not directly targeted this session. GOAL MET     Goal 2: The patient will improve oral preparation phase via bolus control/manipulation exercises to 5/5 each trial. Not directly targeted this session. Completed x10 bolus control exercises with increased effort compared to previous session. Completed x15 L lateral lingual extensions against resistance with increased effort. GOAL MET     Goal 3: The patient will improve oral motor function via therapeutic oral motor exercises to 5/5 each trial. Not directly targeted this session. Completed x15 labial protrusions. Completed x10 cheek protrusions. GOAL MET     The patient/caregiver will demonstrate understanding of compensatory strategies for improved swallowing safety. Adequate demonstration of strategies during AM meal. MI to implement throughout 100% of intake. Discussed continued use of swallow strategies during all PO intake with pt and pt's son present.   GOAL MET Goal 1: Pt will complete problem solving tasks given min cues with 80% accuracy. Not directly targeted this session. Pt answered problem solving questions with picture card scenarios with 95% accuracy. Adequate reasoning for explaining answers for problems. GOAL MET   Goal 2: Pt will utilize compensatory strategies for recall given min cues with 80% accuracy. Recalled yesterday afternoon visitors and activities completed with therapies given min verbal cues with ~80% accuracy. Recalled AM activities (SLP present during meal and trial of regular) given min verbal cues. GOAL MET   Other areas targeted:      Education:   Educated re: strategies to use with regular solids, recall of functional information. Educated re: swallow exercises, problem solving for safety awareness. Safety Devices: [x] Call light within reach  [x] Chair alarm activated and connected to nurse call light system  [] Bed alarm activated   [] Other: [x] Call light within reach  [x] Chair alarm activated and connected to nurse call light system  [] Bed alarm activated    [] Other:    Assessment:  . Mild cognitive impairments in the areas of memory, problem solving, and attention. Mild oral dysphagia, however progressing with use of strategies and adequate carry over with exercises and trials of regular. Plan:  Continue as per POC. Interventions used this date:  [] Speech/Language Treatment  [] Instruction in HEP  [x] Dysphagia Treatment [x] Cognitive Treatment   [] Other:     Discharge recommendations:  [] Home independently  [x] Home with assistance []  24 hour supervision  [] ECF [x] Other: family wanting SNF placement.   Continued Tx Upon Discharge: ? [] Yes    [] No    [x] TBD based on progress while on ARU     [] Vital Stim indicated     [] Other:   Estimated discharge date: 5/28/22     Electronically signed by:  Brennan Zapata M.A., [address] [place] [address]

## 2022-05-27 NOTE — PROGRESS NOTES
Occupational Therapy  Facility/Department: [facility] ACUTE REHAB UNIT  Rehabilitation Occupational Therapy Daily Treatment Note and Discharge Plan    Date: 22  Patient Name: Jody Thompson       Room: 7917/0789-79  MRN: [id number]  Account: [de-identified]   : 1940  (77 y.o.) Gender: female            Past Medical History:  has no past medical history on file. Past Surgical History:   has no past surgical history on file. Restrictions  Restrictions/Precautions: Seizure; Fall Risk  Other position/activity restrictions: up with assistance    Subjective  Subjective: Pt in bed asleep and but easily aroused and agreeable to OT. Restrictions/Precautions: Seizure; Fall Risk        Objective:    Cognition/Orientation:  Oriented x4 requiring increased time for processing and requiring VCs for completion of tasks    Pt frequently grabbing R hand with L hand throughout session requiring VCs for increased functional use of R hand independent of L hand support    Supine to Sit completed with CGA and VCs for tech   Scooting: SBA lateral scooting on EOB and on TTB    Functional Mobility   Sit to Stand: CGA from EOB,Toilet, TTB and recliner chair x2. Pt requiring VCs for hand placement  Stand to Sit: CGA  Bed to Chair Transfer: CGA to and from EOB, toilet, TTB and recliner chair  Commode Transfer: CGA with VCs for positioning  Other: Functional moblity to and from bathroom with RW and CGA with slow gait and VCs for navigation    ADLs   Grooming: CGA to sBA for oral care in stance at sink then requesting seated rest break for lotion application  Bathing: SBA for UE washing seated on TTB with VCs for occasional assist for item management and VCs for next steps. LE washing completed with CGA for steadying stance to wash buttocks  UB dressing: Min A with heavy VCs for orientation and sequencing  LB dressing: Min A with heavy VCs for sequencing,  threading and pulling up on R side.  Pt donning socks with Mod A and increased time  Toileting: CGA with VCs for sequencing pants management               Assessment  Assessment  Assessment:Pt has met 0/5 goals. Pt requiring consistant VCs for sequencing, processing and completion of tasks. ADL shower and dressing tasks completed with frequent cues and  increased time. LE dressing requiring Min A. ADLs completed in stance at sink then pt requesting a seated rest break. Pt would benefit from 24hr assist upon Discharge. Discharge from OT. Activity Tolerance: Patient tolerated treatment well;Patient limited by fatigue;Patient limited by endurance  Discharge Recommendations: 24 hour supervision or assist  Factors Affecting Discharge: Plan is for discharge to SNF. OT Equipment Recommendations  Equipment Needed: No  Safety Devices  Safety Devices in place: Yes  Type of devices: Left in chair;Nurse notified;Call light within reach; Chair alarm in place;Gait belt          Plan If pt discharges prior to next treatment, this note will serve as discharge summary  Plan  Times per Week: 5x/week, 60 mins/day  Current Treatment Recommendations: Strengthening;ROM;Balance training;Functional mobility training; Endurance training;Gait training;Cognitive reorientation; Neuromuscular re-education; Safety education & training;Self-Care / ADL; Patient/Caregiver education & training;Home management training    Goals (as determined and assessed by primary OT)  Patient Goals   Patient goals : \"to get through therapy\"  Short Term Goals  Time Frame for Short term goals: 2 Weeks  Short Term Goal 1: Pt will complete UE bathing and UE dressing i'ly - ongoing  Short Term Goal 2: Pt will complete LE bathing and LE dressing Mod I - ongoing  Short Term Goal 3: Pt will complete toileting, including toilet transfer, Mod I - ongoing  Short Term Goal 4: Pt will demonstrate independence w/ RUE HEP to increase strength for ADLs - ongoing    Therapy Time   Individual Concurrent Group Co-treatment   Time In 1245         Time Out 1345 Minutes 60         Timed Code Treatment Minutes: 60 Minutes   Total Treatment Min: 61 min    [address] [place], [place], PEREZ/L

## 2022-05-27 NOTE — DISCHARGE SUMMARY
Physical Medicine & Rehabilitation  Discharge Summary     Patient Identification:  Evan Palomino  : 1940  Admit date: 2022  Discharge date:  22  Attending provider: George Ferguson DO        Primary care provider: No primary care provider on file. Discharge Diagnoses:   Patient Active Problem List   Diagnosis    [facility] ([facility]    Primary hypertension    Smoking    Bipolar disorder ([facility]    Acute cerebrovascular accident (CVA) ([facility]    Acute ischemic stroke ([facility]       History of Present Illness/Acute Hospital Course: This is a 68yo woman with a PMH of HTN, HLD, bipolar disorder, schizophrenia, current smoker, with vascular risk factors including poorly controlled HTN and smoking presents with left thalamic stroke which is consistent with small vessel ischemic disease.       Inpatient Rehabilitation Course:   Evan Palomino is a 80 y.o. female admitted to inpatient rehabilitation on 2022 with Acute cerebrovascular accident (CVA) ([facility] The patient participated in an aggressive multidisciplinary inpatient rehabilitation program involving 3 hours of therapy per day, at least 5 days per week. Impairments: Decreased functional mobility     Medical Management:  1. 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 Dysphagia- ALP evaluation MBS failed in the past  3. Dysarthria- SLP evaluation   4. Right apical lung nodule- found on CT- 3 month follow up   5. Schizophrenia- continue home medications   Continue Prozac 10mg qd. Has taken in the past with good results.        Discharge Exam:  Constitutional: Alert, WDWN, Pleasant, no distress  Head: Normocephalic, atruamatic, MMM  Eyes: Conjunctiva noninjected, no icterus, no drainage  Pulm: CTA bilat. Respirations non-labored. CV: No murmurs noted. RRR. Abd: Soft, nontender.  NABS+  Ext: No edema, no varicosities  Neuro: Alert, fully oriented, appropriate   MSK: No joint abnormalities noted Discharge Functional Status:    Physical therapy:  Bed Mobility: Scooting: Moderate assistance (to scoot up inbed using hook lying position)  Transfers: Sit to Stand: Stand by assistance  Stand to sit: Stand by assistance,Contact guard assistance (SBA with exception if 1 instance of CGA with safety cues required d/t pt reaching back for chair before safely positioned), Ambulation  Surface: level tile  Device: Rolling Walker  Assistance: Stand by assistance  Quality of Gait: decreased B LE step height/length with decreased heel strike, decreased R LE clearance - temporary improvement in response to VC;  path deviation with visual cues required to correct, forward flexed posture, downward gaze- VC for obstacle avoidance  Gait Deviations: Deviated path,Slow Kaelyn,Decreased step height,Decreased step length  Distance: 185', small distances in gym, 60'  Comments: Recurrent VC for forward gaze/upright posture, obstacle avoidance, and increased step height/length. Very fatigued asking \"Can I go lay down now? \" throughout ambulation  More Ambulation?: Yes, Stairs  # Steps : 6 (+ 3)  Stairs Height: 6\"  Rails: Bilateral  Device: No Device  Assistance: Contact guard assistance  Comment: step to pattern, slow and effortful, min VC for sequencing. Limited by fatigue  Mobility:  , PT Equipment Recommendations  Equipment Needed: No  Other: Pt owns RW/rollator/cane, Assessment: Pt demonstrates decreased tolerance to ambulation this date with decreased ambulation distance d/t fatigue. Pt asks \"can I go lay down now? \" repeatedly throughout session. Pt continues to require SBA for amb with RW with recurrent VC for increased foot clearance and obstacle avoidance. Pt would benefit from continued skilled PT in order to progress funcitonal mobility, R sided strength, and independence. Occupational therapy:  ,  , Assessment: Pt is a 80 y.o. F who presented to [facility] s/p CVA.  Pt is typically independent with all ADLs and requires assistance from her grandchildren for IADLs. Pt presents below functional baseline status and required Min A for UE ADLs and Max A for LE ADLs. Pt is limited by decreased RUE strength and ROM, impaired standing balance, and decreased activity tolerance. Pt benefits from skilled OT to maximize independence and safety with functional tasks, cont OT per POC.     Speech therapy:         Significant Diagnostics:   Lab Results   Component Value Date    CREATININE 0.7 05/27/2022    BUN 20 05/27/2022     05/27/2022    K 4.2 05/27/2022     05/27/2022    CO2 25 05/27/2022       Lab Results   Component Value Date    WBC 10.0 05/27/2022    HGB 10.9 (L) 05/27/2022    HCT 33.2 (L) 05/27/2022    .3 (H) 05/27/2022     05/27/2022       Disposition:  SNF    Services:PT/OT  DME: walker     Discharge Condition: Stable    Follow-up:  See after visit summary from hospitalization    Discharge Medications:     Medication List      START taking these medications    FLUoxetine 10 MG capsule  Commonly known as: PROZAC  Take 1 capsule by mouth daily  Start taking on: May 28, 2022     hydrALAZINE 10 MG tablet  Commonly known as: APRESOLINE  Take 1 tablet by mouth 3 times daily as needed (For systolic BP > 636)     melatonin 5 MG Tbdp disintegrating tablet  Take 2 tablets by mouth nightly     polyethylene glycol 17 g packet  Commonly known as: GLYCOLAX  Take 17 g by mouth daily as needed for Constipation     sennosides-docusate sodium 8.6-50 MG tablet  Commonly known as: SENOKOT-S  Take 1 tablet by mouth 2 times daily        CHANGE how you take these medications    lisinopril 40 MG tablet  Commonly known as: PRINIVIL;ZESTRIL  Take 1 tablet by mouth daily  Start taking on: May 28, 2022  What changed:   · medication strength  · how much to take     metoprolol tartrate 50 MG tablet  Commonly known as: LOPRESSOR  Take 1 tablet by mouth 2 times daily  What changed:   · medication strength  · how much to take        CONTINUE taking these medications    aspirin 81 MG chewable tablet  Take 1 tablet by mouth daily     atorvastatin 40 MG tablet  Commonly known as: LIPITOR     b complex vitamins capsule     docusate sodium 100 mg capsule  Commonly known as: COLACE     estradiol 0.1 MG/GM vaginal cream  Commonly known as: ESTRACE     fluticasone 50 MCG/ACT nasal spray  Commonly known as: FLONASE     furosemide 20 MG tablet  Commonly known as: LASIX     HYDROcodone-acetaminophen 5-325 MG per tablet  Commonly known as: NORCO     lithium 150 MG capsule     memantine 5 MG tablet  Commonly known as: NAMENDA     miSOPROStol 100 MCG tablet  Commonly known as: CYTOTEC     vitamin D 25 MCG (1000 UT) Caps        STOP taking these medications    clotrimazole-betamethasone 1-0.05 % cream  Commonly known as: LOTRISONE     divalproex 125 MG DR tablet  Commonly known as: DEPAKOTE     OLANZapine 15 MG tablet  Commonly known as: ZYPREXA     omeprazole 20 MG delayed release capsule  Commonly known as: PRILOSEC           Where to Get Your Medications      Information about where to get these medications is not yet available    Ask your nurse or doctor about these medications  · FLUoxetine 10 MG capsule  · hydrALAZINE 10 MG tablet  · lisinopril 40 MG tablet  · melatonin 5 MG Tbdp disintegrating tablet  · metoprolol tartrate 50 MG tablet  · polyethylene glycol 17 g packet  · sennosides-docusate sodium 8.6-50 MG tablet           I spent over 35 minutes on this discharge encounter between counseling, coordination of care, and medication reconciliation. To comply with [facility] by R.II.4.1:   Discharge order placed in advance to facilitate patients discharge needs.       Junior Oneill DO

## 2022-05-27 NOTE — CARE COORDINATION
Case Management Assessment            Discharge Note                    Date / Time of Note: 5/27/2022 11:45 AM                  Discharge Note Completed by: LESLI Carranza    Patient Name: Anam Naylor   YOB: 1940  Diagnosis: Acute cerebrovascular accident (CVA) [facility]) [I63.9]   Date / Time: 5/18/2022  6:52 PM    Current PCP: No primary care provider on file. Clinic patient: No    Hospitalization in the last 30 days: No    Advance Directives:  Code Status: Full Code  [place] DNR form completed and on chart: No    Financial:  Payor: MEDICARE / Plan: MEDICARE PART A AND B / Product Type: *No Product type* /      Pharmacy:    [place] [id number] [facility], [place] [id number] [phone] [facility] [phone]  [address] [address]  Phone: [phone] Fax: [phone]      Assistance purchasing medications?:    Assistance provided by Case Management: None at this time    Does patient want to participate in local refill/ meds to beds program?: Yes    Meds To Beds General Rules:  1. Can ONLY be done Monday- Friday between 8:30am-5pm  2. Prescription(s) must be in pharmacy by 3pm to be filled same day  3. Copy of patient's insurance/ prescription drug card and patient face sheet must be sent along with the prescription(s)  4. Cost of Rx cannot be added to hospital bill. If financial assistance is needed, please contact unit  or ;  or  CANNOT provide pharmacy voucher for patients co-pays  5.  Patients can then  the prescription on their way out of the hospital at discharge, or pharmacy can deliver to the bedside if staff is available. (payment due at time of pick-up or delivery - cash, check, or card accepted)     Able to afford home medications/ co-pay costs: Yes    ADLS:  Current PT AM-PAC Score:   /24  Current OT AM-PAC Score:   /24      DISCHARGE Disposition: [facility] SN[facility] - phone [phone]    LOC at discharge: Skilled  OG Completed: Yes    Notification completed in HENS/PAS?:  Yes : CM has completed HENS online through secure website for SNF admission at [facility]. Document ID #: [id number]    IMM Completed:   Not Indicated    Transportation:  Transportation PLAN for discharge: EMS transportation   Mode of Transport: Ambulance stretcher - BLS  Reason for medical transport: Bed confined: Meets the following criteria 1) unable to get out of bed without assistance or ambulate, 2) unable to safely sit up in a wheelchair, 3) unable to maintain erect seating position in a chair for time needed for transport  Name of Transport Company: [employer]  Phone: [phone]  Time of Transport: 4:30PM    Transport form completed: Yes    Home Care:  [address] ordered at discharge: No  [address]: Not Applicable  Orders faxed: No    Durable Medical Equipment:  DME Provider:   Equipment obtained during hospitalization:     Home Oxygen and Respiratory Equipment:  Oxygen needed at discharge?: No  [address]: Not Applicable  Portable tank available for discharge?: No    Dialysis:  Dialysis patient: No    Dialysis Center:  Not Applicable    Additional CM Notes:   SW called and spoke with Admissions at [facility] - can accept Pt this afternoon. Rapid COVID ordered. SW called Pt's son and discussed above - he is in agreement with plan. Staff RN also aware of plan. The Plan for Transition of Care is related to the following treatment goals of Acute cerebrovascular accident (CVA) [facility]) [I63.9]    The Patient and/or patient representative Andria and her family were provided with a choice of provider and agrees with the discharge plan Yes    Freedom of choice list was provided with basic dialogue that supports the patient's individualized plan of care/goals and shares the quality data associated with the providers.  Yes    Care Transitions patient: No    Consuelo Bowman Otilia Caceres, [facility] [employer] [employer]  Case Management Department  Ph: [phone]

## 2022-05-27 NOTE — PROGRESS NOTES
Physical Therapy  Facility/Department: [facility] UNIT  Rehabilitation Physical Therapy Treatment Note/Discharge Summary     NAME: Giulia Hirsch  : 1940 (10 y.o.)  MRN: [id number]  CODE STATUS: Full Code    Date of Service: 22  Chart Reviewed: Yes  Patient assessed for rehabilitation services?: Yes  Additional Pertinent Hx: Pt is a 79 yo female admitted 22 for CVA. neuro consult pending; barium swallow: pending; Brain MRI:  infarct in the left thalamocapsular junction, No intracranial hemorrhage; chest XR: (-) PTX/effusion; CTA head/neck: (-) occlusion/flow limiting stenosis; PMH: COPD, schizophrenia  Family / Caregiver Present: No  Referring Practitioner: DO Mai  Diagnosis: CVA  General Comment  Comments: Pt educated on plan of care, transfer training, and gait training. Restrictions:  Restrictions/Precautions: Seizure; Fall Risk  Position Activity Restriction  Other position/activity restrictions: up with assistance     SUBJECTIVE  Subjective: Pt seated in recliner upon approach and reporting fatigue but agreable to PT. Denies Pain. OBJECTIVE   Functional Mobility  Bed mobility  Rolling to Left: Independent  Rolling to Right: Independent  Supine to Sit: Minimal assistance  Scooting:  Moderate assistance (to scoot up in bed with hook lying technique)  Transfers  Sit to Stand: Stand by assistance  Stand to sit: Stand by assistance  Car Transfer: Stand by assistance (wc<>car with RW, encouragement required for pt to lift legs in and out of bed without assist from PT - uses her UEs to assist)  Comment: sit<>stand completed at recliner/commode/wc/EOB with RW; VC/ tactile cues for hand placement all transfers; occasional reminders for use of R UE without assist from L UE  Balance  Posture: Fair (kyphosis)  Sitting - Static: Good;- (sueprvision without UE support EOB/ on commode)  Sitting - Dynamic: Fair;+ (SBA without UE support EOB/ with B weight shifting on commode)  Standing - Static: Fair;+ (SBA without RW)  Standing - Dynamic: Fair (SBA for ambulation with RW and for brief/ pants management and hand washing  without UE support)  Comments: Pt picks obeject off ground with  UL UE support on RW and CGA. Environmental Mobility  Ambulation  Surface: level tile;uneven;ramp  Device: Rolling Walker  Assistance: Stand by assistance;Contact guard assistance (SBA for amb on level surfaces, CGA for amb on uneven surfaces)  Quality of Gait: decreased B LE step height/length with decreased heel strike, decreased R LE clearance - improvement in response to VC;  path deviation with visual cues required to correct, forward flexed posture, downward gaze- VC for obstacle avoidance  Gait Deviations: Deviated path; Slow Kaelyn;Decreased step height;Decreased step length  Distance: 200', 120' (including ascent/descent of 10' ramp)  Comments: Recurrent VC for forward gaze/upright posture, obstacle avoidance, and increased step height/length. Increased VC for upright posture, step height, and RW placement on ramp    PT Exercises  Exercise Treatment: x10 bridges. x 10 R LE heel slides and supine hip abduction. VC/TC for technique    ASSESSMENT       Activity Tolerance  Activity Tolerance: Patient tolerated treatment well;Patient limited by fatigue;Patient limited by endurance  Activity Tolerance Comments: Asks if she can lay down half way through session, agreeable to continue with encouragement. Assessment  Assessment: Pt continues to require SBA for amb with RW and transfers, CGA for stair mobility, and min A for sit>supine. Demo's improved LE strength/functionl mobility through ability to get legs in and out of car during car transfer without physical assist from PT. Pt requires increased time to meet goals to complete supine<>sit/amb/transfers with mod I/independence and stairs with supervision. Pt would benefit from continued skilled PT and 24/7 supervision assist at discharge.   Treatment Diagnosis: decreased functional mobility  Therapy Prognosis: Good  Decision Making: Medium Complexity  Barriers to Learning: decreased short term memory  Discharge Recommendations: 24 hour supervision or assist (continued skilled PT)  PT D/C Equipment  Other: Pt owns RW/rollator/cane  PT Equipment Recommendations  Equipment Needed: No  Other: Pt owns RW/rollator/cane    CLINICAL IMPRESSION   Pt continues to require SBA for amb with RW and transfers, CGA for stair mobility, and min A for sit>supine. Demo's improved LE strength/functionl mobility through ability to get legs in and out of car during car transfer without physical assist from PT. Pt requires increased time to meet goals to complete supine<>sit/amb/transfers with mod I/independence and stairs with supervision. Pt would benefit from continued skilled PT and 24/7 supervision assist at discharge. GOALS  Patient Goals   Patient goals : to return home  Long Term Goals  Time Frame for Long term goals : 2 weeks- all ongoing  Long term goal 1: Pt will complete bed mobility with mod I (pt owns hospital bed)  Long term goal 2: Pt will complete sit<>stand transfers with mod I and LRAD  Long term goal 3: Pt will complete 150' ambulation with mod I and LRAD  Long term goal 4: Pt will complete 5 steps with B handrails and supervision  Long term goal 5: Pt will complete car transfer with mod I and LRAD    PLAN OF CARE  Frequency: 1-2 treatment sessions per day, 5-7 days per week  Plan  Plan:  (60 min, 5 days a week)  Current Treatment Recommendations: Strengthening;ROM;Balance training;Functional mobility training;Transfer training; Endurance training;Gait training;Stair training  Safety Devices  Type of Devices: Call light within reach;Nurse notified; Left in bed;Bed alarm in place      Therapy Time   Individual Concurrent Group Co-treatment   Time In  1100         Time Out  [address] [place], [place] [id number]

## 2022-05-28 LAB — SARS-COV-2, NAAT: NOT DETECTED

## 2022-05-28 RX ORDER — LITHIUM CARBONATE 150 MG/1
150 CAPSULE ORAL EVERY OTHER DAY
Qty: 90 CAPSULE | Refills: 3 | Status: SHIPPED | OUTPATIENT
Start: 2022-05-28

## 2022-05-28 RX ORDER — HYDROCODONE BITARTRATE AND ACETAMINOPHEN 5; 325 MG/1; MG/1
1 TABLET ORAL EVERY 8 HOURS PRN
Qty: 30 TABLET | Refills: 0 | Status: SHIPPED | OUTPATIENT
Start: 2022-05-28 | End: 2022-06-07